# Patient Record
Sex: FEMALE | Race: WHITE | NOT HISPANIC OR LATINO | Employment: OTHER | URBAN - METROPOLITAN AREA
[De-identification: names, ages, dates, MRNs, and addresses within clinical notes are randomized per-mention and may not be internally consistent; named-entity substitution may affect disease eponyms.]

---

## 2023-03-30 PROBLEM — E11.69 TYPE 2 DIABETES MELLITUS WITH HYPERTRIGLYCERIDEMIA (MULTI): Status: ACTIVE | Noted: 2023-03-30

## 2023-03-30 PROBLEM — H61.23 BILATERAL IMPACTED CERUMEN: Status: ACTIVE | Noted: 2023-03-30

## 2023-03-30 PROBLEM — J32.9 CHRONIC SINUSITIS: Status: ACTIVE | Noted: 2023-03-30

## 2023-03-30 PROBLEM — M67.471 GANGLION CYST OF RIGHT FOOT: Status: ACTIVE | Noted: 2023-03-30

## 2023-03-30 PROBLEM — R05.9 COUGH: Status: ACTIVE | Noted: 2023-03-30

## 2023-03-30 PROBLEM — M79.675 CHRONIC PAIN OF TOE OF LEFT FOOT: Status: ACTIVE | Noted: 2023-03-30

## 2023-03-30 PROBLEM — E78.1 FAMILIAL HYPERTRIGLYCERIDEMIA: Status: ACTIVE | Noted: 2023-03-30

## 2023-03-30 PROBLEM — B36.9 FUNGAL DERMATITIS: Status: ACTIVE | Noted: 2023-03-30

## 2023-03-30 PROBLEM — I45.4 BBB (BUNDLE BRANCH BLOCK): Status: ACTIVE | Noted: 2023-03-30

## 2023-03-30 PROBLEM — E78.5 HYPERLIPIDEMIA: Status: ACTIVE | Noted: 2023-03-30

## 2023-03-30 PROBLEM — N89.8 VAGINAL IRRITATION: Status: ACTIVE | Noted: 2023-03-30

## 2023-03-30 PROBLEM — E55.9 VITAMIN D DEFICIENCY: Status: ACTIVE | Noted: 2023-03-30

## 2023-03-30 PROBLEM — B35.3 TINEA PEDIS: Status: ACTIVE | Noted: 2023-03-30

## 2023-03-30 PROBLEM — R42 VERTIGO: Status: ACTIVE | Noted: 2023-03-30

## 2023-03-30 PROBLEM — T75.3XXA MOTION SICKNESS: Status: ACTIVE | Noted: 2023-03-30

## 2023-03-30 PROBLEM — M79.676 PAIN IN TOE: Status: ACTIVE | Noted: 2023-03-30

## 2023-03-30 PROBLEM — K58.9 IRRITABLE BOWEL SYNDROME (IBS): Status: ACTIVE | Noted: 2023-03-30

## 2023-03-30 PROBLEM — J45.909 ASTHMA (HHS-HCC): Status: ACTIVE | Noted: 2023-03-30

## 2023-03-30 PROBLEM — M14.672 CHARCOT'S JOINT OF LEFT FOOT: Status: ACTIVE | Noted: 2023-03-30

## 2023-03-30 PROBLEM — E07.9 THYROID DISEASE: Status: ACTIVE | Noted: 2023-03-30

## 2023-03-30 PROBLEM — L71.9 ROSACEA: Status: ACTIVE | Noted: 2023-03-30

## 2023-03-30 PROBLEM — K31.7 GASTRIC POLYP: Status: ACTIVE | Noted: 2023-03-30

## 2023-03-30 PROBLEM — J34.89 NOSE PAIN: Status: ACTIVE | Noted: 2023-03-30

## 2023-03-30 PROBLEM — R39.11 URINARY HESITANCY: Status: ACTIVE | Noted: 2023-03-30

## 2023-03-30 PROBLEM — I49.8 FLUTTERING HEART: Status: ACTIVE | Noted: 2023-03-30

## 2023-03-30 PROBLEM — L90.5 ADHERENT SCAR: Status: ACTIVE | Noted: 2023-03-30

## 2023-03-30 PROBLEM — I67.1 INTRACRANIAL ANEURYSM (HHS-HCC): Status: ACTIVE | Noted: 2023-03-30

## 2023-03-30 PROBLEM — G89.29 CHRONIC PAIN OF TOE OF LEFT FOOT: Status: ACTIVE | Noted: 2023-03-30

## 2023-03-30 PROBLEM — R41.3 MEMORY LOSS: Status: ACTIVE | Noted: 2023-03-30

## 2023-03-30 PROBLEM — M54.9 BACK PAIN: Status: ACTIVE | Noted: 2023-03-30

## 2023-03-30 PROBLEM — G62.9 NEUROPATHY: Status: ACTIVE | Noted: 2023-03-30

## 2023-03-30 PROBLEM — R82.81 PYURIA: Status: ACTIVE | Noted: 2023-03-30

## 2023-03-30 PROBLEM — E11.40 DIABETIC NEUROPATHY (MULTI): Status: ACTIVE | Noted: 2023-03-30

## 2023-03-30 PROBLEM — M81.0 OSTEOPOROSIS: Status: ACTIVE | Noted: 2023-03-30

## 2023-03-30 PROBLEM — I10 ESSENTIAL HYPERTENSION: Status: ACTIVE | Noted: 2023-03-30

## 2023-03-30 PROBLEM — F41.9 ANXIETY: Status: ACTIVE | Noted: 2023-03-30

## 2023-03-30 PROBLEM — M25.551 PAIN OF BOTH HIP JOINTS: Status: ACTIVE | Noted: 2023-03-30

## 2023-03-30 PROBLEM — R29.898 WEAKNESS OF BOTH LOWER EXTREMITIES: Status: ACTIVE | Noted: 2023-03-30

## 2023-03-30 PROBLEM — K11.7 XEROSTOMIA DUE TO DEHYDRATION: Status: ACTIVE | Noted: 2023-03-30

## 2023-03-30 PROBLEM — M19.90 OSTEOARTHRITIS: Status: ACTIVE | Noted: 2023-03-30

## 2023-03-30 PROBLEM — D53.9 ANEMIA, DEFICIENCY: Status: ACTIVE | Noted: 2023-03-30

## 2023-03-30 PROBLEM — R68.84 JAW PAIN, NON-TMJ: Status: ACTIVE | Noted: 2023-03-30

## 2023-03-30 PROBLEM — J30.0 VASOMOTOR RHINITIS: Status: ACTIVE | Noted: 2023-03-30

## 2023-03-30 PROBLEM — M76.72 PERONEAL TENDINITIS OF LEFT LOWER EXTREMITY: Status: ACTIVE | Noted: 2023-03-30

## 2023-03-30 PROBLEM — R13.14 PHARYNGOESOPHAGEAL DYSPHAGIA: Status: ACTIVE | Noted: 2023-03-30

## 2023-03-30 PROBLEM — R12 PYROSIS: Status: ACTIVE | Noted: 2023-03-30

## 2023-03-30 PROBLEM — L98.9 SKIN LESION OF FACE: Status: ACTIVE | Noted: 2023-03-30

## 2023-03-30 PROBLEM — U07.1 DISEASE DUE TO SEVERE ACUTE RESPIRATORY SYNDROME CORONAVIRUS 2 (SARS-COV-2): Status: ACTIVE | Noted: 2023-03-30

## 2023-03-30 PROBLEM — I70.234: Status: ACTIVE | Noted: 2023-03-30

## 2023-03-30 PROBLEM — R33.9 URINARY RETENTION: Status: ACTIVE | Noted: 2023-03-30

## 2023-03-30 PROBLEM — G89.29 CHRONIC PAIN OF TOE OF RIGHT FOOT: Status: ACTIVE | Noted: 2023-03-30

## 2023-03-30 PROBLEM — K21.9 GASTROESOPHAGEAL REFLUX DISEASE: Status: ACTIVE | Noted: 2023-03-30

## 2023-03-30 PROBLEM — I73.9 PERIPHERAL VASCULAR DISEASE (CMS-HCC): Status: ACTIVE | Noted: 2023-03-30

## 2023-03-30 PROBLEM — N18.30 CKD STAGE 3 SECONDARY TO DIABETES (MULTI): Status: ACTIVE | Noted: 2023-03-30

## 2023-03-30 PROBLEM — R21 RASH: Status: ACTIVE | Noted: 2023-03-30

## 2023-03-30 PROBLEM — R32 INCONTINENCE: Status: ACTIVE | Noted: 2023-03-30

## 2023-03-30 PROBLEM — R11.0 MODERATE NAUSEA: Status: ACTIVE | Noted: 2023-03-30

## 2023-03-30 PROBLEM — R42 DIZZINESS: Status: ACTIVE | Noted: 2023-03-30

## 2023-03-30 PROBLEM — E86.0 XEROSTOMIA DUE TO DEHYDRATION: Status: ACTIVE | Noted: 2023-03-30

## 2023-03-30 PROBLEM — E78.1 TYPE 2 DIABETES MELLITUS WITH HYPERTRIGLYCERIDEMIA (MULTI): Status: ACTIVE | Noted: 2023-03-30

## 2023-03-30 PROBLEM — R31.9 HEMATURIA: Status: ACTIVE | Noted: 2023-03-30

## 2023-03-30 PROBLEM — N28.1 KIDNEY CYSTS: Status: ACTIVE | Noted: 2023-03-30

## 2023-03-30 PROBLEM — Z20.822 EXPOSURE TO COVID-19 VIRUS: Status: ACTIVE | Noted: 2023-03-30

## 2023-03-30 PROBLEM — E11.65 TYPE 2 DIABETES MELLITUS WITH HYPERGLYCEMIA (MULTI): Status: ACTIVE | Noted: 2023-03-30

## 2023-03-30 PROBLEM — R00.2 PALPITATIONS: Status: ACTIVE | Noted: 2023-03-30

## 2023-03-30 PROBLEM — D16.4 OSTEOMA OF NASAL SINUS: Status: ACTIVE | Noted: 2023-03-30

## 2023-03-30 PROBLEM — N89.8 VAGINAL DISCHARGE: Status: ACTIVE | Noted: 2023-03-30

## 2023-03-30 PROBLEM — K57.30 DIVERTICULOSIS OF LARGE INTESTINE WITHOUT HEMORRHAGE: Status: ACTIVE | Noted: 2023-03-30

## 2023-03-30 PROBLEM — B35.6 TINEA CRURIS: Status: ACTIVE | Noted: 2023-03-30

## 2023-03-30 PROBLEM — R60.0 EDEMA OF EXTREMITIES: Status: ACTIVE | Noted: 2023-03-30

## 2023-03-30 PROBLEM — N81.6 RECTOCELE, FEMALE: Status: ACTIVE | Noted: 2023-03-30

## 2023-03-30 PROBLEM — R51.9 HEADACHE: Status: ACTIVE | Noted: 2023-03-30

## 2023-03-30 PROBLEM — R55 NEAR SYNCOPE: Status: ACTIVE | Noted: 2023-03-30

## 2023-03-30 PROBLEM — H81.10 BPPV (BENIGN PAROXYSMAL POSITIONAL VERTIGO): Status: ACTIVE | Noted: 2023-03-30

## 2023-03-30 PROBLEM — N39.0 FREQUENT UTI: Status: ACTIVE | Noted: 2023-03-30

## 2023-03-30 PROBLEM — M79.674 CHRONIC PAIN OF TOE OF RIGHT FOOT: Status: ACTIVE | Noted: 2023-03-30

## 2023-03-30 PROBLEM — R94.31 ABNORMAL EKG: Status: ACTIVE | Noted: 2023-03-30

## 2023-03-30 PROBLEM — S39.012A LUMBAR STRAIN, INITIAL ENCOUNTER: Status: ACTIVE | Noted: 2023-03-30

## 2023-03-30 PROBLEM — F39 MOOD DISORDER (CMS-HCC): Status: ACTIVE | Noted: 2023-03-30

## 2023-03-30 PROBLEM — B35.4 TINEA CORPORIS: Status: ACTIVE | Noted: 2023-03-30

## 2023-03-30 PROBLEM — M76.70 PERONEAL TENDONITIS: Status: ACTIVE | Noted: 2023-03-30

## 2023-03-30 PROBLEM — E83.42 HYPOMAGNESEMIA: Status: ACTIVE | Noted: 2023-03-30

## 2023-03-30 PROBLEM — M79.89 SWELLING OF LEFT FOOT: Status: ACTIVE | Noted: 2023-03-30

## 2023-03-30 PROBLEM — J06.9 VIRAL URI WITH COUGH: Status: ACTIVE | Noted: 2023-03-30

## 2023-03-30 PROBLEM — M10.9 GOUT: Status: ACTIVE | Noted: 2023-03-30

## 2023-03-30 PROBLEM — B35.1 OM (ONYCHOMYCOSIS): Status: ACTIVE | Noted: 2023-03-30

## 2023-03-30 PROBLEM — J30.9 ALLERGIC RHINITIS: Status: ACTIVE | Noted: 2023-03-30

## 2023-03-30 PROBLEM — E11.22 CKD STAGE 3 SECONDARY TO DIABETES (MULTI): Status: ACTIVE | Noted: 2023-03-30

## 2023-03-30 PROBLEM — J45.909 RAD (REACTIVE AIRWAY DISEASE) (HHS-HCC): Status: ACTIVE | Noted: 2023-03-30

## 2023-03-30 PROBLEM — R26.89 POOR BALANCE: Status: ACTIVE | Noted: 2023-03-30

## 2023-03-30 PROBLEM — F32.A DEPRESSION: Status: ACTIVE | Noted: 2023-03-30

## 2023-03-30 PROBLEM — M25.552 PAIN OF BOTH HIP JOINTS: Status: ACTIVE | Noted: 2023-03-30

## 2023-03-30 PROBLEM — E78.2 HYPERLIPIDEMIA, MIXED: Status: ACTIVE | Noted: 2023-03-30

## 2023-03-30 PROBLEM — N60.19 FIBROCYSTIC BREAST: Status: ACTIVE | Noted: 2023-03-30

## 2023-03-30 PROBLEM — R06.02 SHORTNESS OF BREATH ON EXERTION: Status: ACTIVE | Noted: 2023-03-30

## 2023-03-30 PROBLEM — R30.0 DYSURIA: Status: ACTIVE | Noted: 2023-03-30

## 2023-03-30 PROBLEM — M26.629 TMJ SYNDROME: Status: ACTIVE | Noted: 2023-03-30

## 2023-03-30 PROBLEM — R10.2 PELVIC PAIN IN FEMALE: Status: ACTIVE | Noted: 2023-03-30

## 2023-03-30 PROBLEM — H61.21 IMPACTED CERUMEN OF RIGHT EAR: Status: ACTIVE | Noted: 2023-03-30

## 2023-03-30 PROBLEM — N39.0 UTI (URINARY TRACT INFECTION): Status: ACTIVE | Noted: 2023-03-30

## 2023-03-30 PROBLEM — K63.5 COLONIC POLYP: Status: ACTIVE | Noted: 2023-03-30

## 2023-03-30 RX ORDER — BLOOD SUGAR DIAGNOSTIC
STRIP MISCELLANEOUS
COMMUNITY
Start: 2022-08-04

## 2023-03-30 RX ORDER — BENAZEPRIL HYDROCHLORIDE 40 MG/1
20 TABLET ORAL DAILY
COMMUNITY
End: 2023-10-04 | Stop reason: ALTCHOICE

## 2023-03-30 RX ORDER — AMLODIPINE BESYLATE 5 MG/1
1 TABLET ORAL DAILY
COMMUNITY
Start: 2021-02-05 | End: 2023-04-21 | Stop reason: SINTOL

## 2023-03-30 RX ORDER — ATORVASTATIN CALCIUM 40 MG/1
1 TABLET, FILM COATED ORAL DAILY
COMMUNITY
Start: 2018-03-05 | End: 2023-05-19 | Stop reason: ALTCHOICE

## 2023-03-30 RX ORDER — LATANOPROST 50 UG/ML
SOLUTION/ DROPS OPHTHALMIC
COMMUNITY
Start: 2021-05-27

## 2023-03-30 RX ORDER — ALBUTEROL SULFATE 90 UG/1
POWDER, METERED RESPIRATORY (INHALATION)
COMMUNITY
Start: 2020-04-07 | End: 2023-06-12 | Stop reason: SDUPTHER

## 2023-03-30 RX ORDER — POLYETHYLENE GLYCOL 3350, SODIUM CHLORIDE, SODIUM BICARBONATE, POTASSIUM CHLORIDE 420; 11.2; 5.72; 1.48 G/4L; G/4L; G/4L; G/4L
POWDER, FOR SOLUTION ORAL
COMMUNITY
Start: 2023-03-14 | End: 2024-04-12 | Stop reason: ALTCHOICE

## 2023-03-30 RX ORDER — INSULIN LISPRO 100 [IU]/ML
INJECTION, SOLUTION INTRAVENOUS; SUBCUTANEOUS
COMMUNITY
Start: 2019-09-30 | End: 2023-11-09 | Stop reason: SDUPTHER

## 2023-03-30 RX ORDER — FLUTICASONE PROPIONATE AND SALMETEROL 500; 50 UG/1; UG/1
1 POWDER RESPIRATORY (INHALATION) 2 TIMES DAILY
COMMUNITY
Start: 2022-12-09 | End: 2023-05-19 | Stop reason: ALTCHOICE

## 2023-03-30 RX ORDER — INSULIN GLARGINE 100 [IU]/ML
40 INJECTION, SOLUTION SUBCUTANEOUS
COMMUNITY
Start: 2019-09-20 | End: 2023-11-09 | Stop reason: SDUPTHER

## 2023-03-30 RX ORDER — SCOLOPAMINE TRANSDERMAL SYSTEM 1 MG/1
1 PATCH, EXTENDED RELEASE TRANSDERMAL
COMMUNITY
Start: 2022-08-19 | End: 2023-10-04 | Stop reason: ALTCHOICE

## 2023-03-30 RX ORDER — CARVEDILOL 12.5 MG/1
1 TABLET ORAL 2 TIMES DAILY
COMMUNITY
End: 2023-04-28

## 2023-03-30 RX ORDER — DULOXETIN HYDROCHLORIDE 60 MG/1
1 CAPSULE, DELAYED RELEASE ORAL DAILY
COMMUNITY
End: 2023-05-19 | Stop reason: SDUPTHER

## 2023-03-30 RX ORDER — FLUTICASONE PROPIONATE 50 MCG
2 SPRAY, SUSPENSION (ML) NASAL 2 TIMES DAILY
COMMUNITY
Start: 2020-03-02

## 2023-03-30 RX ORDER — FENOFIBRATE 160 MG/1
1 TABLET ORAL DAILY
COMMUNITY
End: 2023-04-18

## 2023-03-30 RX ORDER — OMEPRAZOLE 40 MG/1
1 CAPSULE, DELAYED RELEASE ORAL DAILY
COMMUNITY
Start: 2019-10-02 | End: 2023-05-10

## 2023-03-30 RX ORDER — SEMAGLUTIDE 1.34 MG/ML
0.5 INJECTION, SOLUTION SUBCUTANEOUS
COMMUNITY
Start: 2022-05-11 | End: 2023-11-09 | Stop reason: ALTCHOICE

## 2023-03-30 RX ORDER — PSEUDOEPHEDRINE HCL 120 MG
TABLET, EXTENDED RELEASE ORAL
COMMUNITY

## 2023-03-30 RX ORDER — GABAPENTIN 100 MG/1
1 CAPSULE ORAL 2 TIMES DAILY
COMMUNITY
Start: 2019-09-12 | End: 2023-05-10

## 2023-03-30 RX ORDER — CETIRIZINE HYDROCHLORIDE 10 MG/1
1 TABLET ORAL DAILY
COMMUNITY
Start: 2020-11-13 | End: 2023-11-02 | Stop reason: SDUPTHER

## 2023-03-31 ENCOUNTER — OFFICE VISIT (OUTPATIENT)
Dept: PRIMARY CARE | Facility: CLINIC | Age: 78
End: 2023-03-31
Payer: MEDICARE

## 2023-03-31 VITALS
DIASTOLIC BLOOD PRESSURE: 60 MMHG | HEIGHT: 65 IN | TEMPERATURE: 98 F | BODY MASS INDEX: 28.16 KG/M2 | WEIGHT: 169 LBS | OXYGEN SATURATION: 98 % | RESPIRATION RATE: 14 BRPM | HEART RATE: 79 BPM | SYSTOLIC BLOOD PRESSURE: 124 MMHG

## 2023-03-31 DIAGNOSIS — N18.30 CKD STAGE 3 SECONDARY TO DIABETES (MULTI): ICD-10-CM

## 2023-03-31 DIAGNOSIS — R10.30 LOWER ABDOMINAL PAIN: Primary | ICD-10-CM

## 2023-03-31 DIAGNOSIS — F39 MOOD DISORDER (CMS-HCC): ICD-10-CM

## 2023-03-31 DIAGNOSIS — M87.00 AVASCULAR NECROSIS OF BONE (MULTI): ICD-10-CM

## 2023-03-31 DIAGNOSIS — I10 ESSENTIAL HYPERTENSION: ICD-10-CM

## 2023-03-31 DIAGNOSIS — I73.9 PERIPHERAL VASCULAR DISEASE (CMS-HCC): ICD-10-CM

## 2023-03-31 DIAGNOSIS — M54.40 ACUTE LEFT-SIDED LOW BACK PAIN WITH SCIATICA, SCIATICA LATERALITY UNSPECIFIED: ICD-10-CM

## 2023-03-31 DIAGNOSIS — I70.234 ATHEROSCLEROSIS OF NATIVE ARTERY OF RIGHT LOWER EXTREMITY WITH ULCERATION OF HEEL (MULTI): ICD-10-CM

## 2023-03-31 DIAGNOSIS — E11.22 CKD STAGE 3 SECONDARY TO DIABETES (MULTI): ICD-10-CM

## 2023-03-31 PROCEDURE — 1160F RVW MEDS BY RX/DR IN RCRD: CPT | Performed by: INTERNAL MEDICINE

## 2023-03-31 PROCEDURE — 3066F NEPHROPATHY DOC TX: CPT | Performed by: INTERNAL MEDICINE

## 2023-03-31 PROCEDURE — 1159F MED LIST DOCD IN RCRD: CPT | Performed by: INTERNAL MEDICINE

## 2023-03-31 PROCEDURE — 4010F ACE/ARB THERAPY RXD/TAKEN: CPT | Performed by: INTERNAL MEDICINE

## 2023-03-31 PROCEDURE — 99214 OFFICE O/P EST MOD 30 MIN: CPT | Performed by: INTERNAL MEDICINE

## 2023-03-31 PROCEDURE — 3078F DIAST BP <80 MM HG: CPT | Performed by: INTERNAL MEDICINE

## 2023-03-31 PROCEDURE — 3074F SYST BP LT 130 MM HG: CPT | Performed by: INTERNAL MEDICINE

## 2023-03-31 PROCEDURE — 1036F TOBACCO NON-USER: CPT | Performed by: INTERNAL MEDICINE

## 2023-03-31 RX ORDER — METRONIDAZOLE 500 MG/1
500 TABLET ORAL 2 TIMES DAILY
Qty: 14 TABLET | Refills: 0 | Status: SHIPPED | OUTPATIENT
Start: 2023-03-31 | End: 2023-04-07

## 2023-03-31 RX ORDER — CIPROFLOXACIN 500 MG/1
500 TABLET ORAL 2 TIMES DAILY
Qty: 14 TABLET | Refills: 0 | Status: SHIPPED | OUTPATIENT
Start: 2023-03-31 | End: 2023-04-07

## 2023-03-31 ASSESSMENT — PAIN SCALES - GENERAL: PAINLEVEL: 6

## 2023-03-31 ASSESSMENT — ENCOUNTER SYMPTOMS
WHEEZING: 0
CONSTIPATION: 1
ABDOMINAL PAIN: 1
SHORTNESS OF BREATH: 0
COUGH: 0

## 2023-03-31 NOTE — PROGRESS NOTES
"Subjective   Patient ID: Elma Hernandez is a 77 y.o. female who presents for Groin Pain (Pain x 5 days).    Groin Pain  Associated symptoms include abdominal pain and constipation.    She had what started as low back pain - left lower PSIS area.  Then felt pain in her actual groin.  Some leg ache as well.  Both of these are improving, but is now having LLQ abdominal discomfort.  She admits to issues with constipation.  No N/V.  No F/C/S.      Review of Systems   Respiratory:  Negative for cough, shortness of breath and wheezing.    Gastrointestinal:  Positive for abdominal pain and constipation.       Objective   /60 (BP Location: Right arm, Patient Position: Sitting, BP Cuff Size: Adult)   Pulse 79   Temp 36.7 °C (98 °F) (Tympanic)   Resp 14   Ht 1.651 m (5' 5\")   Wt 76.7 kg (169 lb)   SpO2 98%   BMI 28.12 kg/m²     Physical Exam  Constitutional:       General: She is not in acute distress.     Appearance: Normal appearance. She is not ill-appearing.   HENT:      Head: Normocephalic and atraumatic.      Nose: Nose normal.   Eyes:      Extraocular Movements: Extraocular movements intact.      Conjunctiva/sclera: Conjunctivae normal.      Pupils: Pupils are equal, round, and reactive to light.   Cardiovascular:      Rate and Rhythm: Normal rate and regular rhythm.      Heart sounds: Normal heart sounds.   Pulmonary:      Effort: Pulmonary effort is normal.      Breath sounds: Normal breath sounds.   Abdominal:      General: Abdomen is flat. There is no distension.      Palpations: Abdomen is soft.      Comments: Minimal discomfort with deep palpation of LLQ.     Musculoskeletal:         General: Normal range of motion.      Cervical back: Neck supple.   Neurological:      General: No focal deficit present.      Mental Status: She is alert.      Gait: Gait normal.   Psychiatric:         Mood and Affect: Mood normal.         Behavior: Behavior normal.         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  Her back and groin pain certainly seem MSK in etiology, likely arthritis in nature.  We discussed conservative treatments for this including 400mg of ibuprofen TID for 3-4 days with GI prophylaxis and plenty of water.    We discussed bowel regimen to get her bowels moving.  It does not seem as though she has diverticulitis or any infection currently.  Howerver, if pain increases and/or she develops any F/C/S over the weekend we discussed cipro/flagyl treatment.    All questions answered.     Follow up as previously scheduled - sooner if any issues/changes.

## 2023-04-03 ENCOUNTER — OFFICE VISIT (OUTPATIENT)
Dept: PRIMARY CARE | Facility: CLINIC | Age: 78
End: 2023-04-03
Payer: MEDICARE

## 2023-04-03 VITALS
SYSTOLIC BLOOD PRESSURE: 108 MMHG | WEIGHT: 171 LBS | DIASTOLIC BLOOD PRESSURE: 58 MMHG | OXYGEN SATURATION: 97 % | RESPIRATION RATE: 18 BRPM | TEMPERATURE: 97.1 F | BODY MASS INDEX: 28.46 KG/M2 | HEART RATE: 74 BPM

## 2023-04-03 DIAGNOSIS — H61.23 BILATERAL IMPACTED CERUMEN: Primary | ICD-10-CM

## 2023-04-03 PROCEDURE — 1160F RVW MEDS BY RX/DR IN RCRD: CPT | Performed by: NURSE PRACTITIONER

## 2023-04-03 PROCEDURE — 69209 REMOVE IMPACTED EAR WAX UNI: CPT | Performed by: NURSE PRACTITIONER

## 2023-04-03 PROCEDURE — 3074F SYST BP LT 130 MM HG: CPT | Performed by: NURSE PRACTITIONER

## 2023-04-03 PROCEDURE — 1159F MED LIST DOCD IN RCRD: CPT | Performed by: NURSE PRACTITIONER

## 2023-04-03 PROCEDURE — 3066F NEPHROPATHY DOC TX: CPT | Performed by: NURSE PRACTITIONER

## 2023-04-03 PROCEDURE — 1036F TOBACCO NON-USER: CPT | Performed by: NURSE PRACTITIONER

## 2023-04-03 PROCEDURE — 3078F DIAST BP <80 MM HG: CPT | Performed by: NURSE PRACTITIONER

## 2023-04-03 PROCEDURE — 99214 OFFICE O/P EST MOD 30 MIN: CPT | Performed by: NURSE PRACTITIONER

## 2023-04-03 PROCEDURE — 4010F ACE/ARB THERAPY RXD/TAKEN: CPT | Performed by: NURSE PRACTITIONER

## 2023-04-03 ASSESSMENT — ENCOUNTER SYMPTOMS
HEADACHES: 0
SINUS PRESSURE: 0
VOMITING: 0
RHINORRHEA: 0
CHILLS: 0
ACTIVITY CHANGE: 0
COUGH: 0
FEVER: 0
CONSTIPATION: 0
SORE THROAT: 0
SINUS PAIN: 0
NAUSEA: 0
SLEEP DISTURBANCE: 0
FATIGUE: 0
DIARRHEA: 0

## 2023-04-03 NOTE — PROGRESS NOTES
Subjective   Patient ID: Elma Hernandez is a 77 y.o. female who presents for Ear Fullness.    Bilateral ears  R ear was really blocked  Tried to use Debrox    Has known hx of ear wax    Ear Fullness   There is pain in the right ear. This is a new problem. The current episode started in the past 7 days. The problem occurs constantly. The problem has been gradually improving. Pertinent negatives include no coughing, diarrhea, ear discharge, headaches, rhinorrhea, sore throat or vomiting. She has tried ear drops for the symptoms.        Review of Systems   Constitutional:  Negative for activity change, chills, fatigue and fever.   HENT:  Negative for congestion, ear discharge, postnasal drip, rhinorrhea, sinus pressure, sinus pain and sore throat.    Respiratory:  Negative for cough.    Gastrointestinal:  Negative for constipation, diarrhea, nausea and vomiting.   Neurological:  Negative for headaches.   Psychiatric/Behavioral:  Negative for sleep disturbance.        Objective   /58   Pulse 74   Temp 36.2 °C (97.1 °F) (Temporal)   Resp 18   Wt 77.6 kg (171 lb)   SpO2 97%   BMI 28.46 kg/m²     Physical Exam  Vitals reviewed.   Constitutional:       Appearance: Normal appearance.   HENT:      Head: Normocephalic.      Right Ear: Decreased hearing noted. No drainage. There is impacted cerumen.      Left Ear: Decreased hearing noted. No drainage. There is impacted cerumen.      Nose: Nose normal.      Mouth/Throat:      Lips: Pink.      Mouth: Mucous membranes are moist.   Eyes:      Extraocular Movements: Extraocular movements intact.      Pupils: Pupils are equal, round, and reactive to light.   Cardiovascular:      Rate and Rhythm: Normal rate and regular rhythm.      Pulses: Normal pulses.      Heart sounds: Normal heart sounds.   Musculoskeletal:      Cervical back: Normal range of motion and neck supple.   Skin:     General: Skin is warm and dry.      Capillary Refill: Capillary refill takes less than 2  seconds.   Neurological:      Mental Status: She is alert and oriented to person, place, and time.   Psychiatric:         Mood and Affect: Mood normal.         Behavior: Behavior normal.     Patient ID: Elma Hernandez is a 77 y.o. female.    Ear Cerumen Removal    Date/Time: 4/3/2023 1:41 PM    Performed by: HARVEY Artis  Authorized by: HARVEY Artis    Consent:     Consent obtained:  Verbal    Consent given by:  Patient    Risks, benefits, and alternatives were discussed: yes      Risks discussed:  Bleeding, pain and dizziness    Alternatives discussed:  Observation  Universal protocol:     Procedure explained and questions answered to patient or proxy's satisfaction: yes      Relevant documents present and verified: no      Test results available: no      Imaging studies available: no      Required blood products, implants, devices, and special equipment available: yes      Site/side marked: no      Patient identity confirmed:  Verbally with patient  Procedure details:     Location:  L ear and R ear    Procedure type: irrigation      Procedure outcomes: cerumen removed    Post-procedure details:     Inspection:  No bleeding, TM intact and ear canal clear    Hearing quality:  Improved    Procedure completion:  Tolerated      Assessment/Plan

## 2023-04-03 NOTE — ASSESSMENT & PLAN NOTE
Bilateral ears irrigated successfully  Pt tolerated well and states improvement  Can follow up with PCP as needed

## 2023-04-18 DIAGNOSIS — E78.5 HYPERLIPIDEMIA, UNSPECIFIED HYPERLIPIDEMIA TYPE: Primary | ICD-10-CM

## 2023-04-18 RX ORDER — FENOFIBRATE 160 MG/1
TABLET ORAL
Qty: 90 TABLET | Refills: 0 | Status: SHIPPED | OUTPATIENT
Start: 2023-04-18 | End: 2023-05-19 | Stop reason: SDUPTHER

## 2023-04-18 RX ORDER — ATORVASTATIN CALCIUM 10 MG/1
TABLET, FILM COATED ORAL
Qty: 90 TABLET | Refills: 0 | Status: SHIPPED | OUTPATIENT
Start: 2023-04-18 | End: 2023-05-19 | Stop reason: SDUPTHER

## 2023-04-20 LAB
ALANINE AMINOTRANSFERASE (SGPT) (U/L) IN SER/PLAS: 16 U/L (ref 7–45)
ALBUMIN (G/DL) IN SER/PLAS: 4.4 G/DL (ref 3.4–5)
ALKALINE PHOSPHATASE (U/L) IN SER/PLAS: 42 U/L (ref 33–136)
ANION GAP IN SER/PLAS: 12 MMOL/L (ref 10–20)
ASPARTATE AMINOTRANSFERASE (SGOT) (U/L) IN SER/PLAS: 18 U/L (ref 9–39)
BILIRUBIN TOTAL (MG/DL) IN SER/PLAS: 0.5 MG/DL (ref 0–1.2)
CALCIUM (MG/DL) IN SER/PLAS: 9.6 MG/DL (ref 8.6–10.3)
CARBON DIOXIDE, TOTAL (MMOL/L) IN SER/PLAS: 27 MMOL/L (ref 21–32)
CHLORIDE (MMOL/L) IN SER/PLAS: 100 MMOL/L (ref 98–107)
CHOLESTEROL (MG/DL) IN SER/PLAS: 169 MG/DL (ref 0–199)
CHOLESTEROL IN HDL (MG/DL) IN SER/PLAS: 38 MG/DL
CHOLESTEROL/HDL RATIO: 4.4
COBALAMIN (VITAMIN B12) (PG/ML) IN SER/PLAS: 288 PG/ML (ref 211–911)
CREATININE (MG/DL) IN SER/PLAS: 1.8 MG/DL (ref 0.5–1.05)
ERYTHROCYTE DISTRIBUTION WIDTH (RATIO) BY AUTOMATED COUNT: 12.8 % (ref 11.5–14.5)
ERYTHROCYTE MEAN CORPUSCULAR HEMOGLOBIN CONCENTRATION (G/DL) BY AUTOMATED: 33 G/DL (ref 32–36)
ERYTHROCYTE MEAN CORPUSCULAR VOLUME (FL) BY AUTOMATED COUNT: 83 FL (ref 80–100)
ERYTHROCYTES (10*6/UL) IN BLOOD BY AUTOMATED COUNT: 4.4 X10E12/L (ref 4–5.2)
ESTIMATED AVERAGE GLUCOSE FOR HBA1C: 143 MG/DL
GFR FEMALE: 29 ML/MIN/1.73M2
GLUCOSE (MG/DL) IN SER/PLAS: 109 MG/DL (ref 74–99)
HEMATOCRIT (%) IN BLOOD BY AUTOMATED COUNT: 36.4 % (ref 36–46)
HEMOGLOBIN (G/DL) IN BLOOD: 12 G/DL (ref 12–16)
HEMOGLOBIN A1C/HEMOGLOBIN TOTAL IN BLOOD: 6.6 %
LDL: 88 MG/DL (ref 0–99)
LEUKOCYTES (10*3/UL) IN BLOOD BY AUTOMATED COUNT: 5.4 X10E9/L (ref 4.4–11.3)
MAGNESIUM (MG/DL) IN SER/PLAS: 1.92 MG/DL (ref 1.6–2.4)
NON HDL CHOLESTEROL: 131 MG/DL
NRBC (PER 100 WBCS) BY AUTOMATED COUNT: 0 /100 WBC (ref 0–0)
PLATELETS (10*3/UL) IN BLOOD AUTOMATED COUNT: 225 X10E9/L (ref 150–450)
POTASSIUM (MMOL/L) IN SER/PLAS: 4.6 MMOL/L (ref 3.5–5.3)
PROTEIN TOTAL: 6.9 G/DL (ref 6.4–8.2)
SODIUM (MMOL/L) IN SER/PLAS: 134 MMOL/L (ref 136–145)
THYROTROPIN (MIU/L) IN SER/PLAS BY DETECTION LIMIT <= 0.05 MIU/L: 1.98 MIU/L (ref 0.44–3.98)
TRIGLYCERIDE (MG/DL) IN SER/PLAS: 215 MG/DL (ref 0–149)
UREA NITROGEN (MG/DL) IN SER/PLAS: 34 MG/DL (ref 6–23)
VLDL: 43 MG/DL (ref 0–40)

## 2023-04-21 ENCOUNTER — OFFICE VISIT (OUTPATIENT)
Dept: PRIMARY CARE | Facility: CLINIC | Age: 78
End: 2023-04-21
Payer: MEDICARE

## 2023-04-21 VITALS
RESPIRATION RATE: 16 BRPM | TEMPERATURE: 98 F | HEIGHT: 65 IN | DIASTOLIC BLOOD PRESSURE: 46 MMHG | HEART RATE: 78 BPM | WEIGHT: 173 LBS | SYSTOLIC BLOOD PRESSURE: 104 MMHG | BODY MASS INDEX: 28.82 KG/M2

## 2023-04-21 DIAGNOSIS — I73.9 PAD (PERIPHERAL ARTERY DISEASE) (CMS-HCC): ICD-10-CM

## 2023-04-21 DIAGNOSIS — I10 ESSENTIAL HYPERTENSION: ICD-10-CM

## 2023-04-21 DIAGNOSIS — N17.9 AKI (ACUTE KIDNEY INJURY) (CMS-HCC): ICD-10-CM

## 2023-04-21 DIAGNOSIS — E53.8 B12 DEFICIENCY: ICD-10-CM

## 2023-04-21 DIAGNOSIS — E07.9 THYROID DISEASE: Primary | ICD-10-CM

## 2023-04-21 PROBLEM — L98.9 SKIN LESION OF FACE: Status: RESOLVED | Noted: 2023-03-30 | Resolved: 2023-04-21

## 2023-04-21 PROBLEM — R21 RASH: Status: RESOLVED | Noted: 2023-03-30 | Resolved: 2023-04-21

## 2023-04-21 PROBLEM — G62.9 NEUROPATHY: Status: RESOLVED | Noted: 2023-03-30 | Resolved: 2023-04-21

## 2023-04-21 PROBLEM — N39.0 FREQUENT UTI: Status: RESOLVED | Noted: 2023-03-30 | Resolved: 2023-04-21

## 2023-04-21 PROBLEM — B35.3 TINEA PEDIS: Status: RESOLVED | Noted: 2023-03-30 | Resolved: 2023-04-21

## 2023-04-21 PROBLEM — M26.629 TMJ SYNDROME: Status: RESOLVED | Noted: 2023-03-30 | Resolved: 2023-04-21

## 2023-04-21 PROBLEM — N89.8 VAGINAL DISCHARGE: Status: RESOLVED | Noted: 2023-03-30 | Resolved: 2023-04-21

## 2023-04-21 PROBLEM — R33.9 URINARY RETENTION: Status: RESOLVED | Noted: 2023-03-30 | Resolved: 2023-04-21

## 2023-04-21 PROBLEM — Z20.822 EXPOSURE TO COVID-19 VIRUS: Status: RESOLVED | Noted: 2023-03-30 | Resolved: 2023-04-21

## 2023-04-21 PROBLEM — M25.551 PAIN OF BOTH HIP JOINTS: Status: RESOLVED | Noted: 2023-03-30 | Resolved: 2023-04-21

## 2023-04-21 PROBLEM — I49.8 FLUTTERING HEART: Status: RESOLVED | Noted: 2023-03-30 | Resolved: 2023-04-21

## 2023-04-21 PROBLEM — H81.10 BPPV (BENIGN PAROXYSMAL POSITIONAL VERTIGO): Status: RESOLVED | Noted: 2023-03-30 | Resolved: 2023-04-21

## 2023-04-21 PROBLEM — B35.4 TINEA CORPORIS: Status: RESOLVED | Noted: 2023-03-30 | Resolved: 2023-04-21

## 2023-04-21 PROBLEM — K63.5 COLONIC POLYP: Status: RESOLVED | Noted: 2023-03-30 | Resolved: 2023-04-21

## 2023-04-21 PROBLEM — D16.4 OSTEOMA OF NASAL SINUS: Status: RESOLVED | Noted: 2023-03-30 | Resolved: 2023-04-21

## 2023-04-21 PROBLEM — M25.552 PAIN OF BOTH HIP JOINTS: Status: RESOLVED | Noted: 2023-03-30 | Resolved: 2023-04-21

## 2023-04-21 PROBLEM — R12 PYROSIS: Status: RESOLVED | Noted: 2023-03-30 | Resolved: 2023-04-21

## 2023-04-21 PROBLEM — N60.19 FIBROCYSTIC BREAST: Status: RESOLVED | Noted: 2023-03-30 | Resolved: 2023-04-21

## 2023-04-21 PROBLEM — R39.11 URINARY HESITANCY: Status: RESOLVED | Noted: 2023-03-30 | Resolved: 2023-04-21

## 2023-04-21 PROBLEM — M76.70 PERONEAL TENDONITIS: Status: RESOLVED | Noted: 2023-03-30 | Resolved: 2023-04-21

## 2023-04-21 PROBLEM — J32.9 CHRONIC SINUSITIS: Status: RESOLVED | Noted: 2023-03-30 | Resolved: 2023-04-21

## 2023-04-21 PROBLEM — R06.02 SHORTNESS OF BREATH ON EXERTION: Status: RESOLVED | Noted: 2023-03-30 | Resolved: 2023-04-21

## 2023-04-21 PROBLEM — R05.9 COUGH: Status: RESOLVED | Noted: 2023-03-30 | Resolved: 2023-04-21

## 2023-04-21 PROBLEM — M67.471 GANGLION CYST OF RIGHT FOOT: Status: RESOLVED | Noted: 2023-03-30 | Resolved: 2023-04-21

## 2023-04-21 PROBLEM — G89.29 CHRONIC PAIN OF TOE OF RIGHT FOOT: Status: RESOLVED | Noted: 2023-03-30 | Resolved: 2023-04-21

## 2023-04-21 PROBLEM — K11.7 XEROSTOMIA DUE TO DEHYDRATION: Status: RESOLVED | Noted: 2023-03-30 | Resolved: 2023-04-21

## 2023-04-21 PROBLEM — N81.6 RECTOCELE, FEMALE: Status: RESOLVED | Noted: 2023-03-30 | Resolved: 2023-04-21

## 2023-04-21 PROBLEM — T75.3XXA MOTION SICKNESS: Status: RESOLVED | Noted: 2023-03-30 | Resolved: 2023-04-21

## 2023-04-21 PROBLEM — B35.1 OM (ONYCHOMYCOSIS): Status: RESOLVED | Noted: 2023-03-30 | Resolved: 2023-04-21

## 2023-04-21 PROBLEM — R51.9 HEADACHE: Status: RESOLVED | Noted: 2023-03-30 | Resolved: 2023-04-21

## 2023-04-21 PROBLEM — M79.674 CHRONIC PAIN OF TOE OF RIGHT FOOT: Status: RESOLVED | Noted: 2023-03-30 | Resolved: 2023-04-21

## 2023-04-21 PROBLEM — J06.9 VIRAL URI WITH COUGH: Status: RESOLVED | Noted: 2023-03-30 | Resolved: 2023-04-21

## 2023-04-21 PROBLEM — H61.23 BILATERAL IMPACTED CERUMEN: Status: RESOLVED | Noted: 2023-03-30 | Resolved: 2023-04-21

## 2023-04-21 PROBLEM — J45.909 ASTHMA (HHS-HCC): Status: RESOLVED | Noted: 2023-03-30 | Resolved: 2023-04-21

## 2023-04-21 PROBLEM — U07.1 DISEASE DUE TO SEVERE ACUTE RESPIRATORY SYNDROME CORONAVIRUS 2 (SARS-COV-2): Status: RESOLVED | Noted: 2023-03-30 | Resolved: 2023-04-21

## 2023-04-21 PROBLEM — R13.14 PHARYNGOESOPHAGEAL DYSPHAGIA: Status: RESOLVED | Noted: 2023-03-30 | Resolved: 2023-04-21

## 2023-04-21 PROBLEM — R94.31 ABNORMAL EKG: Status: RESOLVED | Noted: 2023-03-30 | Resolved: 2023-04-21

## 2023-04-21 PROBLEM — R68.84 JAW PAIN, NON-TMJ: Status: RESOLVED | Noted: 2023-03-30 | Resolved: 2023-04-21

## 2023-04-21 PROBLEM — L90.5 ADHERENT SCAR: Status: RESOLVED | Noted: 2023-03-30 | Resolved: 2023-04-21

## 2023-04-21 PROBLEM — E78.5 HYPERLIPIDEMIA: Status: RESOLVED | Noted: 2023-03-30 | Resolved: 2023-04-21

## 2023-04-21 PROBLEM — R82.81 PYURIA: Status: RESOLVED | Noted: 2023-03-30 | Resolved: 2023-04-21

## 2023-04-21 PROBLEM — G89.29 CHRONIC PAIN OF TOE OF LEFT FOOT: Status: RESOLVED | Noted: 2023-03-30 | Resolved: 2023-04-21

## 2023-04-21 PROBLEM — M79.675 CHRONIC PAIN OF TOE OF LEFT FOOT: Status: RESOLVED | Noted: 2023-03-30 | Resolved: 2023-04-21

## 2023-04-21 PROBLEM — R11.0 MODERATE NAUSEA: Status: RESOLVED | Noted: 2023-03-30 | Resolved: 2023-04-21

## 2023-04-21 PROBLEM — J34.89 NOSE PAIN: Status: RESOLVED | Noted: 2023-03-30 | Resolved: 2023-04-21

## 2023-04-21 PROBLEM — M76.72 PERONEAL TENDINITIS OF LEFT LOWER EXTREMITY: Status: RESOLVED | Noted: 2023-03-30 | Resolved: 2023-04-21

## 2023-04-21 PROBLEM — R31.9 HEMATURIA: Status: RESOLVED | Noted: 2023-03-30 | Resolved: 2023-04-21

## 2023-04-21 PROBLEM — N28.1 KIDNEY CYSTS: Status: RESOLVED | Noted: 2023-03-30 | Resolved: 2023-04-21

## 2023-04-21 PROBLEM — R10.2 PELVIC PAIN IN FEMALE: Status: RESOLVED | Noted: 2023-03-30 | Resolved: 2023-04-21

## 2023-04-21 PROBLEM — R32 INCONTINENCE: Status: RESOLVED | Noted: 2023-03-30 | Resolved: 2023-04-21

## 2023-04-21 PROBLEM — M54.9 BACK PAIN: Status: RESOLVED | Noted: 2023-03-30 | Resolved: 2023-04-21

## 2023-04-21 PROBLEM — N89.8 VAGINAL IRRITATION: Status: RESOLVED | Noted: 2023-03-30 | Resolved: 2023-04-21

## 2023-04-21 PROBLEM — R30.0 DYSURIA: Status: RESOLVED | Noted: 2023-03-30 | Resolved: 2023-04-21

## 2023-04-21 PROBLEM — R55 NEAR SYNCOPE: Status: RESOLVED | Noted: 2023-03-30 | Resolved: 2023-04-21

## 2023-04-21 PROBLEM — B36.9 FUNGAL DERMATITIS: Status: RESOLVED | Noted: 2023-03-30 | Resolved: 2023-04-21

## 2023-04-21 PROBLEM — H61.21 IMPACTED CERUMEN OF RIGHT EAR: Status: RESOLVED | Noted: 2023-03-30 | Resolved: 2023-04-21

## 2023-04-21 PROBLEM — R60.0 EDEMA OF EXTREMITIES: Status: RESOLVED | Noted: 2023-03-30 | Resolved: 2023-04-21

## 2023-04-21 PROBLEM — R41.3 MEMORY LOSS: Status: RESOLVED | Noted: 2023-03-30 | Resolved: 2023-04-21

## 2023-04-21 PROBLEM — M79.89 SWELLING OF LEFT FOOT: Status: RESOLVED | Noted: 2023-03-30 | Resolved: 2023-04-21

## 2023-04-21 PROBLEM — E86.0 XEROSTOMIA DUE TO DEHYDRATION: Status: RESOLVED | Noted: 2023-03-30 | Resolved: 2023-04-21

## 2023-04-21 PROBLEM — M79.676 PAIN IN TOE: Status: RESOLVED | Noted: 2023-03-30 | Resolved: 2023-04-21

## 2023-04-21 PROBLEM — B35.6 TINEA CRURIS: Status: RESOLVED | Noted: 2023-03-30 | Resolved: 2023-04-21

## 2023-04-21 PROBLEM — N39.0 UTI (URINARY TRACT INFECTION): Status: RESOLVED | Noted: 2023-03-30 | Resolved: 2023-04-21

## 2023-04-21 PROBLEM — S39.012A LUMBAR STRAIN, INITIAL ENCOUNTER: Status: RESOLVED | Noted: 2023-03-30 | Resolved: 2023-04-21

## 2023-04-21 PROCEDURE — 3044F HG A1C LEVEL LT 7.0%: CPT | Performed by: INTERNAL MEDICINE

## 2023-04-21 PROCEDURE — 1036F TOBACCO NON-USER: CPT | Performed by: INTERNAL MEDICINE

## 2023-04-21 PROCEDURE — 1160F RVW MEDS BY RX/DR IN RCRD: CPT | Performed by: INTERNAL MEDICINE

## 2023-04-21 PROCEDURE — 1159F MED LIST DOCD IN RCRD: CPT | Performed by: INTERNAL MEDICINE

## 2023-04-21 PROCEDURE — 99215 OFFICE O/P EST HI 40 MIN: CPT | Performed by: INTERNAL MEDICINE

## 2023-04-21 PROCEDURE — 3074F SYST BP LT 130 MM HG: CPT | Performed by: INTERNAL MEDICINE

## 2023-04-21 PROCEDURE — 3066F NEPHROPATHY DOC TX: CPT | Performed by: INTERNAL MEDICINE

## 2023-04-21 PROCEDURE — 3078F DIAST BP <80 MM HG: CPT | Performed by: INTERNAL MEDICINE

## 2023-04-21 PROCEDURE — 4010F ACE/ARB THERAPY RXD/TAKEN: CPT | Performed by: INTERNAL MEDICINE

## 2023-04-21 RX ORDER — CHOLECALCIFEROL (VITAMIN D3) 125 MCG
1 CAPSULE ORAL DAILY
Qty: 90 TABLET | Refills: 3 | Status: SHIPPED | OUTPATIENT
Start: 2023-04-21 | End: 2024-04-20

## 2023-04-21 ASSESSMENT — PATIENT HEALTH QUESTIONNAIRE - PHQ9
2. FEELING DOWN, DEPRESSED OR HOPELESS: NOT AT ALL
SUM OF ALL RESPONSES TO PHQ9 QUESTIONS 1 & 2: 0
1. LITTLE INTEREST OR PLEASURE IN DOING THINGS: NOT AT ALL

## 2023-04-21 NOTE — PROGRESS NOTES
"Subjective   Patient ID: Elma Hernandez is a 77 y.o. female who presents for OTHER (Muscle weakness, feels BP is low , ).  LE feel weak when she is walking.  Present for the past few weeks.  No progressive, but not getting better.   Chronic LE neuropathy.  She has been out of her B 12 tablets.    Admits to some fatigue and occasional dizzy spells.  She denies any issues with CP or SOB.  No focal weakness or neurologic complaint.    Objective   BP (!) 104/46 (BP Location: Right arm, Patient Position: Sitting, BP Cuff Size: Adult)   Pulse 78   Temp 36.7 °C (98 °F) (Temporal)   Resp 16   Ht 1.638 m (5' 4.5\")   Wt 78.5 kg (173 lb)   BMI 29.24 kg/m²     Physical Exam  Vitals reviewed.   Constitutional:       Appearance: Normal appearance.   HENT:      Head: Normocephalic.   Cardiovascular:      Rate and Rhythm: Normal rate and regular rhythm.      Heart sounds: Normal heart sounds.   Pulmonary:      Effort: Pulmonary effort is normal.      Breath sounds: Normal breath sounds.   Musculoskeletal:         General: Normal range of motion.   Neurological:      General: No focal deficit present.      Mental Status: She is alert.   Psychiatric:         Mood and Affect: Mood normal.         Assessment/Plan   Problem List Items Addressed This Visit          Circulatory    Essential hypertension       Endocrine/Metabolic    Thyroid disease - Primary     Other Visit Diagnoses       STEPHANIE (acute kidney injury) (CMS/[facility])        Relevant Orders    Basic Metabolic Panel    PAD (peripheral artery disease) (CMS/[facility])        Relevant Orders    Vascular US PVR with exercise    B12 deficiency        Relevant Medications    mecobalamin, vitamin B12, 1,000 mcg tablet,disintegrating        We reviewed and discussed all of the above as well as her recent lab results.  Her compliance with her medications has been much better.    Thyroid is controlled.    HTG is best it has been in long time.  Continue current medications.    She has long " standing HTN, but lately lower Bps.  For HTN - we will stop amlodipine (5mg)  If BP rises above 130-140 we will restart at 2.5 mg  LE weakness with distance walking.  Could be consistent with claudication.  Hx of PAD and multiple risk factors.  Most recent labs were reviewed.  A1c is better.  Lipids and Triglycerides are also much better.  No medication changes for this, but due to symptoms, history and risks we will proceed with PVR of Legs.    We also discussed her renal function.  She admits to not drinking much fluids through the day.  We will stop her amlodipine.  Stressed need for increasing fluids (sugar free fluids).   We will recheck in approximately 4 days.  We will have her monitor her BP.  If it increases above 130/140 we will restart the amlodipine at half the dose.    We will proceed with the above plan and see her back in 1 month - sooner if any issues.

## 2023-04-25 ENCOUNTER — TELEPHONE (OUTPATIENT)
Dept: PEDIATRICS | Facility: CLINIC | Age: 78
End: 2023-04-25

## 2023-04-25 ENCOUNTER — TELEPHONE (OUTPATIENT)
Dept: PRIMARY CARE | Facility: CLINIC | Age: 78
End: 2023-04-25

## 2023-04-25 ENCOUNTER — LAB (OUTPATIENT)
Dept: LAB | Facility: LAB | Age: 78
End: 2023-04-25
Payer: MEDICARE

## 2023-04-25 DIAGNOSIS — N17.9 AKI (ACUTE KIDNEY INJURY) (CMS-HCC): ICD-10-CM

## 2023-04-25 LAB
ANION GAP IN SER/PLAS: 9 MMOL/L (ref 10–20)
CALCIUM (MG/DL) IN SER/PLAS: 9.4 MG/DL (ref 8.6–10.3)
CARBON DIOXIDE, TOTAL (MMOL/L) IN SER/PLAS: 28 MMOL/L (ref 21–32)
CHLORIDE (MMOL/L) IN SER/PLAS: 101 MMOL/L (ref 98–107)
CREATININE (MG/DL) IN SER/PLAS: 1.75 MG/DL (ref 0.5–1.05)
GFR FEMALE: 30 ML/MIN/1.73M2
GLUCOSE (MG/DL) IN SER/PLAS: 105 MG/DL (ref 74–99)
POTASSIUM (MMOL/L) IN SER/PLAS: 4.7 MMOL/L (ref 3.5–5.3)
SODIUM (MMOL/L) IN SER/PLAS: 133 MMOL/L (ref 136–145)
UREA NITROGEN (MG/DL) IN SER/PLAS: 36 MG/DL (ref 6–23)

## 2023-04-25 PROCEDURE — 80048 BASIC METABOLIC PNL TOTAL CA: CPT

## 2023-04-25 PROCEDURE — 36415 COLL VENOUS BLD VENIPUNCTURE: CPT

## 2023-04-25 NOTE — TELEPHONE ENCOUNTER
Patient states she was supposed to have blood work to test electrolytes before colonoscopy which she did yesterday. Pt states her colonoscopy is Friday and wants to know if electrolytes are ok to continue with procedure

## 2023-04-26 ENCOUNTER — TELEPHONE (OUTPATIENT)
Dept: PRIMARY CARE | Facility: CLINIC | Age: 78
End: 2023-04-26
Payer: MEDICARE

## 2023-04-26 DIAGNOSIS — N17.9 AKI (ACUTE KIDNEY INJURY) (CMS-HCC): Primary | ICD-10-CM

## 2023-04-26 NOTE — TELEPHONE ENCOUNTER
Pt is aware of abnormal test results and follow needed. ----- Message from Bennie Traylor DO sent at 4/26/2023 12:51 PM EDT -----  Kidney blood test is only minimally improved.  She needs to continue with hydratino and we need to repeat blood tests again in a week and check a kidney ultrasound - both ordered.

## 2023-04-28 DIAGNOSIS — I10 ESSENTIAL HYPERTENSION: Primary | ICD-10-CM

## 2023-04-28 RX ORDER — CARVEDILOL 12.5 MG/1
TABLET ORAL
Qty: 180 TABLET | Refills: 0 | Status: SHIPPED | OUTPATIENT
Start: 2023-04-28 | End: 2023-05-19 | Stop reason: SDUPTHER

## 2023-05-03 ENCOUNTER — TELEPHONE (OUTPATIENT)
Dept: PRIMARY CARE | Facility: CLINIC | Age: 78
End: 2023-05-03

## 2023-05-03 ENCOUNTER — LAB (OUTPATIENT)
Dept: LAB | Facility: LAB | Age: 78
End: 2023-05-03
Payer: MEDICARE

## 2023-05-03 DIAGNOSIS — N18.32 STAGE 3B CHRONIC KIDNEY DISEASE (MULTI): Primary | ICD-10-CM

## 2023-05-03 DIAGNOSIS — N17.9 AKI (ACUTE KIDNEY INJURY) (CMS-HCC): ICD-10-CM

## 2023-05-03 LAB
ANION GAP IN SER/PLAS: 12 MMOL/L (ref 10–20)
CALCIUM (MG/DL) IN SER/PLAS: 9.6 MG/DL (ref 8.6–10.3)
CARBON DIOXIDE, TOTAL (MMOL/L) IN SER/PLAS: 27 MMOL/L (ref 21–32)
CHLORIDE (MMOL/L) IN SER/PLAS: 98 MMOL/L (ref 98–107)
CREATININE (MG/DL) IN SER/PLAS: 1.9 MG/DL (ref 0.5–1.05)
GFR FEMALE: 27 ML/MIN/1.73M2
GLUCOSE (MG/DL) IN SER/PLAS: 133 MG/DL (ref 74–99)
POTASSIUM (MMOL/L) IN SER/PLAS: 4.7 MMOL/L (ref 3.5–5.3)
SODIUM (MMOL/L) IN SER/PLAS: 132 MMOL/L (ref 136–145)
UREA NITROGEN (MG/DL) IN SER/PLAS: 35 MG/DL (ref 6–23)

## 2023-05-03 PROCEDURE — 80048 BASIC METABOLIC PNL TOTAL CA: CPT

## 2023-05-03 PROCEDURE — 36415 COLL VENOUS BLD VENIPUNCTURE: CPT

## 2023-05-03 NOTE — RESULT ENCOUNTER NOTE
Elma's kidneys are still up higher than they should be (but getting worse).  I would like her to see a kidney specialist for further recommendations.

## 2023-05-03 NOTE — TELEPHONE ENCOUNTER
Pt is aware of abnormal test results and follow needed. ----- Message from Bennie Traylor DO sent at 5/3/2023 12:44 PM EDT -----  Elma's kidneys are still up higher than they should be (but getting worse).  I would like her to see a kidney specialist for further recommendations.

## 2023-05-04 ENCOUNTER — TELEPHONE (OUTPATIENT)
Dept: PRIMARY CARE | Facility: CLINIC | Age: 78
End: 2023-05-04
Payer: MEDICARE

## 2023-05-04 NOTE — TELEPHONE ENCOUNTER
Pt aware of normal test results . ----- Message from Bennie Traylor DO sent at 5/3/2023  5:31 PM EDT -----  Kidney ultrasound looks ok.

## 2023-05-09 DIAGNOSIS — K21.9 GASTROESOPHAGEAL REFLUX DISEASE, UNSPECIFIED WHETHER ESOPHAGITIS PRESENT: Primary | ICD-10-CM

## 2023-05-09 DIAGNOSIS — E11.42 DIABETIC POLYNEUROPATHY ASSOCIATED WITH TYPE 2 DIABETES MELLITUS (MULTI): ICD-10-CM

## 2023-05-10 RX ORDER — OMEPRAZOLE 40 MG/1
CAPSULE, DELAYED RELEASE ORAL
Qty: 90 CAPSULE | Refills: 0 | Status: SHIPPED | OUTPATIENT
Start: 2023-05-10 | End: 2023-10-30 | Stop reason: SDUPTHER

## 2023-05-10 RX ORDER — GABAPENTIN 100 MG/1
CAPSULE ORAL
Qty: 180 CAPSULE | Refills: 0 | Status: SHIPPED | OUTPATIENT
Start: 2023-05-10 | End: 2023-05-19 | Stop reason: SDUPTHER

## 2023-05-15 ENCOUNTER — TELEPHONE (OUTPATIENT)
Dept: PRIMARY CARE | Facility: CLINIC | Age: 78
End: 2023-05-15
Payer: MEDICARE

## 2023-05-15 NOTE — TELEPHONE ENCOUNTER
Pt is aware of abnormal test results and follow needed. ----- Message from Bennie Traylor DO sent at 5/15/2023  1:01 PM EDT -----  So her test on her leg circulation does show some progression / worsening from her previous study.  I would like her to follow up with her vascular doctor.

## 2023-05-19 ENCOUNTER — OFFICE VISIT (OUTPATIENT)
Dept: PRIMARY CARE | Facility: CLINIC | Age: 78
End: 2023-05-19
Payer: MEDICARE

## 2023-05-19 VITALS
OXYGEN SATURATION: 97 % | TEMPERATURE: 97 F | SYSTOLIC BLOOD PRESSURE: 130 MMHG | HEIGHT: 64 IN | HEART RATE: 68 BPM | RESPIRATION RATE: 15 BRPM | BODY MASS INDEX: 29.37 KG/M2 | WEIGHT: 172 LBS | DIASTOLIC BLOOD PRESSURE: 80 MMHG

## 2023-05-19 DIAGNOSIS — I10 PRIMARY HYPERTENSION: Primary | ICD-10-CM

## 2023-05-19 DIAGNOSIS — E78.5 HYPERLIPIDEMIA, UNSPECIFIED HYPERLIPIDEMIA TYPE: ICD-10-CM

## 2023-05-19 DIAGNOSIS — N18.30 STAGE 3 CHRONIC KIDNEY DISEASE, UNSPECIFIED WHETHER STAGE 3A OR 3B CKD (MULTI): ICD-10-CM

## 2023-05-19 DIAGNOSIS — D53.9 ANEMIA, DEFICIENCY: ICD-10-CM

## 2023-05-19 DIAGNOSIS — J31.0 RHINITIS, UNSPECIFIED TYPE: ICD-10-CM

## 2023-05-19 DIAGNOSIS — E08.42 DIABETIC POLYNEUROPATHY ASSOCIATED WITH DIABETES MELLITUS DUE TO UNDERLYING CONDITION (MULTI): ICD-10-CM

## 2023-05-19 DIAGNOSIS — I73.9 PERIPHERAL VASCULAR DISEASE (CMS-HCC): ICD-10-CM

## 2023-05-19 DIAGNOSIS — E11.649 HYPOGLYCEMIA DUE TO TYPE 2 DIABETES MELLITUS (MULTI): ICD-10-CM

## 2023-05-19 DIAGNOSIS — E11.42 DIABETIC POLYNEUROPATHY ASSOCIATED WITH TYPE 2 DIABETES MELLITUS (MULTI): ICD-10-CM

## 2023-05-19 DIAGNOSIS — I10 ESSENTIAL HYPERTENSION: ICD-10-CM

## 2023-05-19 PROBLEM — K31.7 GASTRIC POLYP: Status: RESOLVED | Noted: 2023-03-30 | Resolved: 2023-05-19

## 2023-05-19 PROBLEM — R42 DIZZINESS: Status: RESOLVED | Noted: 2023-03-30 | Resolved: 2023-05-19

## 2023-05-19 PROBLEM — R00.2 PALPITATIONS: Status: RESOLVED | Noted: 2023-03-30 | Resolved: 2023-05-19

## 2023-05-19 LAB
APPEARANCE, URINE: CLEAR
BILIRUBIN, URINE: NEGATIVE
BLOOD, URINE: NEGATIVE
COLOR, URINE: ABNORMAL
CREATININE (MG/DL) IN URINE: 36.9 MG/DL (ref 20–320)
GLUCOSE, URINE: NEGATIVE MG/DL
KETONES, URINE: NEGATIVE MG/DL
LEUKOCYTE ESTERASE, URINE: ABNORMAL
NITRITE, URINE: NEGATIVE
PH, URINE: 7 (ref 5–8)
PROTEIN (MG/DL) IN URINE: 15 MG/DL (ref 5–24)
PROTEIN, URINE: NEGATIVE MG/DL
PROTEIN/CREATININE (MG/MG) IN URINE: 0.41 MG/MG CREAT (ref 0–0.17)
RBC, URINE: ABNORMAL /HPF (ref 0–5)
SPECIFIC GRAVITY, URINE: 1.01 (ref 1–1.03)
UROBILINOGEN, URINE: <2 MG/DL (ref 0–1.9)
WBC, URINE: ABNORMAL /HPF (ref 0–5)

## 2023-05-19 PROCEDURE — 3044F HG A1C LEVEL LT 7.0%: CPT | Performed by: INTERNAL MEDICINE

## 2023-05-19 PROCEDURE — 3079F DIAST BP 80-89 MM HG: CPT | Performed by: INTERNAL MEDICINE

## 2023-05-19 PROCEDURE — 99214 OFFICE O/P EST MOD 30 MIN: CPT | Performed by: INTERNAL MEDICINE

## 2023-05-19 PROCEDURE — 1159F MED LIST DOCD IN RCRD: CPT | Performed by: INTERNAL MEDICINE

## 2023-05-19 PROCEDURE — 4010F ACE/ARB THERAPY RXD/TAKEN: CPT | Performed by: INTERNAL MEDICINE

## 2023-05-19 PROCEDURE — 3066F NEPHROPATHY DOC TX: CPT | Performed by: INTERNAL MEDICINE

## 2023-05-19 PROCEDURE — 1036F TOBACCO NON-USER: CPT | Performed by: INTERNAL MEDICINE

## 2023-05-19 PROCEDURE — 1160F RVW MEDS BY RX/DR IN RCRD: CPT | Performed by: INTERNAL MEDICINE

## 2023-05-19 PROCEDURE — 3075F SYST BP GE 130 - 139MM HG: CPT | Performed by: INTERNAL MEDICINE

## 2023-05-19 RX ORDER — GABAPENTIN 100 MG/1
100 CAPSULE ORAL 2 TIMES DAILY
Qty: 180 CAPSULE | Refills: 3 | Status: SHIPPED | OUTPATIENT
Start: 2023-05-19 | End: 2023-10-03 | Stop reason: SDUPTHER

## 2023-05-19 RX ORDER — ATORVASTATIN CALCIUM 10 MG/1
10 TABLET, FILM COATED ORAL DAILY
Qty: 90 TABLET | Refills: 3 | Status: SHIPPED | OUTPATIENT
Start: 2023-05-19 | End: 2023-10-23 | Stop reason: SDUPTHER

## 2023-05-19 RX ORDER — DULOXETIN HYDROCHLORIDE 60 MG/1
60 CAPSULE, DELAYED RELEASE ORAL DAILY
Qty: 90 CAPSULE | Refills: 3 | Status: SHIPPED | OUTPATIENT
Start: 2023-05-19 | End: 2023-08-29

## 2023-05-19 RX ORDER — FENOFIBRATE 160 MG/1
160 TABLET ORAL DAILY
Qty: 90 TABLET | Refills: 3 | Status: SHIPPED | OUTPATIENT
Start: 2023-05-19 | End: 2024-05-18

## 2023-05-19 RX ORDER — CARVEDILOL 12.5 MG/1
12.5 TABLET ORAL 2 TIMES DAILY
Qty: 180 TABLET | Refills: 3 | Status: SHIPPED | OUTPATIENT
Start: 2023-05-19 | End: 2023-10-04 | Stop reason: SDUPTHER

## 2023-05-19 ASSESSMENT — ENCOUNTER SYMPTOMS
HYPERTENSION: 1
SHORTNESS OF BREATH: 0
ABDOMINAL PAIN: 0
CONSTIPATION: 0
WHEEZING: 0
PALPITATIONS: 0
COUGH: 0

## 2023-05-19 NOTE — PROGRESS NOTES
"Subjective   Patient ID: Elma Hernandez is a 77 y.o. female who presents for Hypertension (Follow up , htn  and lab work.).    Hypertension  Pertinent negatives include no chest pain, palpitations or shortness of breath.    She has been having progressive CKD as of late.  She met with a nephrologist, but did not particularly mesh with him.    She has DM and admits to getting hypoglycemic events.  She has not adjusted her insulin.      Review of Systems   Respiratory:  Negative for cough, shortness of breath and wheezing.    Cardiovascular:  Negative for chest pain and palpitations.   Gastrointestinal:  Negative for abdominal pain and constipation.       Objective   /80 (BP Location: Left arm, Patient Position: Sitting, BP Cuff Size: Adult)   Pulse 68   Temp 36.1 °C (97 °F) (Tympanic)   Resp 15   Ht 1.626 m (5' 4\")   Wt 78 kg (172 lb)   SpO2 97%   BMI 29.52 kg/m²     Physical Exam  Vitals reviewed.   Constitutional:       Appearance: Normal appearance.   HENT:      Head: Normocephalic.   Cardiovascular:      Rate and Rhythm: Normal rate.   Pulmonary:      Effort: Pulmonary effort is normal.   Musculoskeletal:         General: Normal range of motion.   Neurological:      General: No focal deficit present.      Mental Status: She is alert.   Psychiatric:         Mood and Affect: Mood normal.         Assessment/Plan   Problem List Items Addressed This Visit          Nervous    Diabetic neuropathy (CMS/HCC)    Relevant Medications    gabapentin (Neurontin) 100 mg capsule    DULoxetine (Cymbalta) 60 mg DR capsule       Circulatory    Essential hypertension    Relevant Medications    carvedilol (Coreg) 12.5 mg tablet    Peripheral vascular disease (CMS/HCC)       Hematologic    Anemia, deficiency     Other Visit Diagnoses       Primary hypertension    -  Primary    Stage 3 chronic kidney disease, unspecified whether stage 3a or 3b CKD (CMS/HCC)        BMI 29.0-29.9,adult        Rhinitis, unspecified type     "    Hyperlipidemia, unspecified hyperlipidemia type        Relevant Medications    fenofibrate (Triglide) 160 mg tablet    atorvastatin (Lipitor) 10 mg tablet    Hypoglycemia due to type 2 diabetes mellitus (CMS/HCC)            Multiple ongoing issues.    HTN controlled.  Recent lipids/triglycerides are also stable.  No changes in medications.    + hypoglycemia.  Discussed cutting back on insulin by 6 units to start and she needs to contact her endocrinologist.    Discussed her progressive CKD - stressed need for good fluid intake as well as avoidance of NSAIDs.    Follow up in 3 months - sooner if any issues.

## 2023-06-12 DIAGNOSIS — J45.20 MILD INTERMITTENT REACTIVE AIRWAY DISEASE WITHOUT COMPLICATION (HHS-HCC): Primary | ICD-10-CM

## 2023-06-12 RX ORDER — ALBUTEROL SULFATE 90 UG/1
POWDER, METERED RESPIRATORY (INHALATION)
Qty: 0.65 G | Refills: 11 | Status: SHIPPED | OUTPATIENT
Start: 2023-06-12

## 2023-07-14 ENCOUNTER — OFFICE VISIT (OUTPATIENT)
Dept: PRIMARY CARE | Facility: CLINIC | Age: 78
End: 2023-07-14
Payer: MEDICARE

## 2023-07-14 VITALS
HEART RATE: 70 BPM | BODY MASS INDEX: 29.53 KG/M2 | TEMPERATURE: 97.6 F | HEIGHT: 64 IN | WEIGHT: 173 LBS | OXYGEN SATURATION: 98 % | DIASTOLIC BLOOD PRESSURE: 70 MMHG | SYSTOLIC BLOOD PRESSURE: 130 MMHG | RESPIRATION RATE: 14 BRPM

## 2023-07-14 DIAGNOSIS — R21 FACIAL RASH: ICD-10-CM

## 2023-07-14 DIAGNOSIS — I73.9 INTERMITTENT CLAUDICATION (CMS-HCC): ICD-10-CM

## 2023-07-14 DIAGNOSIS — I10 ESSENTIAL HYPERTENSION: Primary | ICD-10-CM

## 2023-07-14 DIAGNOSIS — R35.0 URINARY FREQUENCY: ICD-10-CM

## 2023-07-14 LAB
POC APPEARANCE, URINE: CLEAR
POC BILIRUBIN, URINE: NEGATIVE
POC BLOOD, URINE: NEGATIVE
POC COLOR, URINE: ABNORMAL
POC GLUCOSE, URINE: NEGATIVE MG/DL
POC KETONES, URINE: NEGATIVE MG/DL
POC LEUKOCYTES, URINE: ABNORMAL
POC NITRITE,URINE: NEGATIVE
POC PH, URINE: 8 PH
POC PROTEIN, URINE: NEGATIVE MG/DL
POC SPECIFIC GRAVITY, URINE: 1.01
POC UROBILINOGEN, URINE: 1 EU/DL

## 2023-07-14 PROCEDURE — 1160F RVW MEDS BY RX/DR IN RCRD: CPT | Performed by: INTERNAL MEDICINE

## 2023-07-14 PROCEDURE — 81002 URINALYSIS NONAUTO W/O SCOPE: CPT | Performed by: INTERNAL MEDICINE

## 2023-07-14 PROCEDURE — 1036F TOBACCO NON-USER: CPT | Performed by: INTERNAL MEDICINE

## 2023-07-14 PROCEDURE — 1159F MED LIST DOCD IN RCRD: CPT | Performed by: INTERNAL MEDICINE

## 2023-07-14 PROCEDURE — 99214 OFFICE O/P EST MOD 30 MIN: CPT | Performed by: INTERNAL MEDICINE

## 2023-07-14 PROCEDURE — 3075F SYST BP GE 130 - 139MM HG: CPT | Performed by: INTERNAL MEDICINE

## 2023-07-14 PROCEDURE — 3078F DIAST BP <80 MM HG: CPT | Performed by: INTERNAL MEDICINE

## 2023-07-14 PROCEDURE — 1125F AMNT PAIN NOTED PAIN PRSNT: CPT | Performed by: INTERNAL MEDICINE

## 2023-07-14 RX ORDER — METRONIDAZOLE 7.5 MG/G
GEL TOPICAL 2 TIMES DAILY
Qty: 45 G | Refills: 2 | Status: SHIPPED | OUTPATIENT
Start: 2023-07-14 | End: 2023-10-04 | Stop reason: ALTCHOICE

## 2023-07-14 RX ORDER — AMOXICILLIN 875 MG/1
875 TABLET, FILM COATED ORAL 2 TIMES DAILY
Qty: 6 TABLET | Refills: 0 | Status: SHIPPED | OUTPATIENT
Start: 2023-07-14 | End: 2023-07-17

## 2023-07-14 ASSESSMENT — ENCOUNTER SYMPTOMS
SORE THROAT: 1
COUGH: 0
SHORTNESS OF BREATH: 0
WHEEZING: 0

## 2023-07-14 NOTE — PROGRESS NOTES
"Subjective   Patient ID: Elma Hernandez is a 78 y.o. female who presents for Sore Throat (Sore throat x 2 days.).    Sore Throat   Pertinent negatives include no coughing or shortness of breath.    Claudication - has not started medication.    Urinary frequency without fever.    She has claudication, but tolerable.  She has not started her pletal as recommended by vascular.      Review of Systems   HENT:  Positive for sore throat.    Respiratory:  Negative for cough, shortness of breath and wheezing.        Objective   /70 (BP Location: Left arm, Patient Position: Sitting, BP Cuff Size: Adult)   Pulse 70   Temp 36.4 °C (97.6 °F) (Tympanic)   Resp 14   Ht 1.626 m (5' 4\")   Wt 78.5 kg (173 lb)   SpO2 98%   BMI 29.70 kg/m²     Physical Exam  Vitals reviewed.   Constitutional:       Appearance: Normal appearance.   HENT:      Head: Normocephalic.      Mouth/Throat:      Pharynx: Oropharynx is clear.   Cardiovascular:      Rate and Rhythm: Normal rate.   Pulmonary:      Effort: Pulmonary effort is normal.   Musculoskeletal:         General: Normal range of motion.   Skin:     General: Skin is dry.      Comments: Mild rash of face.    Neurological:      General: No focal deficit present.      Mental Status: She is alert.   Psychiatric:         Mood and Affect: Mood normal.         Assessment/Plan   Problem List Items Addressed This Visit       Essential hypertension - Primary     Other Visit Diagnoses       Intermittent claudication (CMS/HCC)        Facial rash        Relevant Medications    metroNIDAZOLE (Metrogel) 0.75 % gel    Urinary frequency        Relevant Medications    amoxicillin (Amoxil) 875 mg tablet    Other Relevant Orders    POCT UA (nonautomated) manually resulted (Completed)        Discussed all of the above.    HTN controlled.   Mild UTI.  Discussed treatment.    Discussed pletal and benefit of improving symptoms.             "

## 2023-07-20 ENCOUNTER — TELEPHONE (OUTPATIENT)
Dept: PRIMARY CARE | Facility: CLINIC | Age: 78
End: 2023-07-20
Payer: MEDICARE

## 2023-07-20 NOTE — TELEPHONE ENCOUNTER
[place]  Took x3 days supply Amoxicillin.  Still having pressure.  Questioned if med can be extended/new Rx? Requesting call back.    Requesting to [facility] [phone].

## 2023-07-21 DIAGNOSIS — N39.0 URINARY TRACT INFECTION WITHOUT HEMATURIA, SITE UNSPECIFIED: Primary | ICD-10-CM

## 2023-07-21 RX ORDER — DOXYCYCLINE 100 MG/1
100 CAPSULE ORAL 2 TIMES DAILY
Qty: 14 CAPSULE | Refills: 0 | Status: SHIPPED | OUTPATIENT
Start: 2023-07-21 | End: 2023-07-28

## 2023-08-11 ENCOUNTER — OFFICE VISIT (OUTPATIENT)
Dept: PRIMARY CARE | Facility: CLINIC | Age: 78
End: 2023-08-11
Payer: MEDICARE

## 2023-08-11 VITALS
OXYGEN SATURATION: 98 % | HEART RATE: 68 BPM | TEMPERATURE: 98.2 F | SYSTOLIC BLOOD PRESSURE: 100 MMHG | WEIGHT: 172 LBS | BODY MASS INDEX: 29.37 KG/M2 | RESPIRATION RATE: 14 BRPM | HEIGHT: 64 IN | DIASTOLIC BLOOD PRESSURE: 60 MMHG

## 2023-08-11 DIAGNOSIS — I10 ESSENTIAL (PRIMARY) HYPERTENSION: Primary | ICD-10-CM

## 2023-08-11 DIAGNOSIS — I73.9 PERIPHERAL VASCULAR DISEASE (CMS-HCC): ICD-10-CM

## 2023-08-11 PROCEDURE — 1160F RVW MEDS BY RX/DR IN RCRD: CPT | Performed by: INTERNAL MEDICINE

## 2023-08-11 PROCEDURE — 1036F TOBACCO NON-USER: CPT | Performed by: INTERNAL MEDICINE

## 2023-08-11 PROCEDURE — 3074F SYST BP LT 130 MM HG: CPT | Performed by: INTERNAL MEDICINE

## 2023-08-11 PROCEDURE — 99213 OFFICE O/P EST LOW 20 MIN: CPT | Performed by: INTERNAL MEDICINE

## 2023-08-11 PROCEDURE — 3078F DIAST BP <80 MM HG: CPT | Performed by: INTERNAL MEDICINE

## 2023-08-11 PROCEDURE — 1125F AMNT PAIN NOTED PAIN PRSNT: CPT | Performed by: INTERNAL MEDICINE

## 2023-08-11 PROCEDURE — 1159F MED LIST DOCD IN RCRD: CPT | Performed by: INTERNAL MEDICINE

## 2023-08-11 RX ORDER — ALLOPURINOL 100 MG/1
100 TABLET ORAL
COMMUNITY
Start: 2023-06-20 | End: 2023-10-04 | Stop reason: ALTCHOICE

## 2023-08-11 RX ORDER — CILOSTAZOL 100 MG/1
100 TABLET ORAL 2 TIMES DAILY
COMMUNITY
Start: 2023-06-21 | End: 2023-10-04 | Stop reason: ALTCHOICE

## 2023-08-11 ASSESSMENT — ENCOUNTER SYMPTOMS
PALPITATIONS: 0
COUGH: 0
DIARRHEA: 0
HYPERTENSION: 1
SHORTNESS OF BREATH: 0
WHEEZING: 0
ABDOMINAL PAIN: 0
CONSTIPATION: 0

## 2023-08-11 NOTE — PROGRESS NOTES
"Subjective   Patient ID: Elma Hernandez is a 78 y.o. female who presents for Hypertension.    Hypertension  Pertinent negatives include no chest pain, palpitations or shortness of breath.   Blood pressures at home have been low.  She has chronic balance issues and vertigo, this does not seem to be worse but she has had issues with her renal function.      Review of Systems   Respiratory:  Negative for cough, shortness of breath and wheezing.    Cardiovascular:  Negative for chest pain and palpitations.   Gastrointestinal:  Negative for abdominal pain, constipation and diarrhea.       Objective   /60 (BP Location: Left arm, Patient Position: Sitting, BP Cuff Size: Adult)   Pulse 68   Temp 36.8 °C (98.2 °F) (Tympanic)   Resp 14   Ht 1.626 m (5' 4\")   Wt 78 kg (172 lb)   SpO2 98%   BMI 29.52 kg/m²     Physical Exam  Vitals reviewed.   Constitutional:       Appearance: Normal appearance.   HENT:      Head: Normocephalic.   Cardiovascular:      Rate and Rhythm: Normal rate.   Pulmonary:      Effort: Pulmonary effort is normal.   Musculoskeletal:         General: Normal range of motion.   Neurological:      General: No focal deficit present.      Mental Status: She is alert.   Psychiatric:         Mood and Affect: Mood normal.       Assessment/Plan   Problem List Items Addressed This Visit       Peripheral vascular disease (CMS/HCC)     Other Visit Diagnoses       Essential (primary) hypertension    -  Primary        We discussed the above and the benefit of her new medication (Pletal).  Her BP has been low.  We did review and discuss her lab results.    For the present time we will cut her benazapril in half.  We also stressed increasing her fluids.  She has scheduled a follow up blood test.   We will see her back in 2 months- sooner if any issues.        "

## 2023-08-18 DIAGNOSIS — I10 ESSENTIAL HYPERTENSION: Primary | ICD-10-CM

## 2023-08-22 RX ORDER — AMLODIPINE BESYLATE 5 MG/1
5 TABLET ORAL DAILY
Qty: 90 TABLET | Refills: 0 | Status: SHIPPED | OUTPATIENT
Start: 2023-08-22 | End: 2023-10-04 | Stop reason: ALTCHOICE

## 2023-08-29 ENCOUNTER — OFFICE VISIT (OUTPATIENT)
Dept: PRIMARY CARE | Facility: CLINIC | Age: 78
End: 2023-08-29
Payer: MEDICARE

## 2023-08-29 VITALS
OXYGEN SATURATION: 98 % | DIASTOLIC BLOOD PRESSURE: 84 MMHG | TEMPERATURE: 98.4 F | WEIGHT: 170 LBS | BODY MASS INDEX: 29.18 KG/M2 | RESPIRATION RATE: 16 BRPM | HEART RATE: 59 BPM | SYSTOLIC BLOOD PRESSURE: 138 MMHG

## 2023-08-29 DIAGNOSIS — I10 ESSENTIAL HYPERTENSION: ICD-10-CM

## 2023-08-29 DIAGNOSIS — F32.0 CURRENT MILD EPISODE OF MAJOR DEPRESSIVE DISORDER, UNSPECIFIED WHETHER RECURRENT (CMS-HCC): ICD-10-CM
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
## 2023-08-29 DIAGNOSIS — J31.0 RHINITIS, UNSPECIFIED TYPE: ICD-10-CM

## 2023-08-29 DIAGNOSIS — F41.9 ANXIETY: Primary | ICD-10-CM

## 2023-08-29 DIAGNOSIS — I73.9 PAD (PERIPHERAL ARTERY DISEASE) (CMS-HCC): ICD-10-CM

## 2023-08-29 PROCEDURE — 3079F DIAST BP 80-89 MM HG: CPT | Performed by: INTERNAL MEDICINE

## 2023-08-29 PROCEDURE — 99214 OFFICE O/P EST MOD 30 MIN: CPT | Performed by: INTERNAL MEDICINE

## 2023-08-29 PROCEDURE — 1125F AMNT PAIN NOTED PAIN PRSNT: CPT | Performed by: INTERNAL MEDICINE

## 2023-08-29 PROCEDURE — 90677 PCV20 VACCINE IM: CPT | Performed by: INTERNAL MEDICINE

## 2023-08-29 PROCEDURE — G0009 ADMIN PNEUMOCOCCAL VACCINE: HCPCS | Performed by: INTERNAL MEDICINE

## 2023-08-29 PROCEDURE — 1159F MED LIST DOCD IN RCRD: CPT | Performed by: INTERNAL MEDICINE

## 2023-08-29 PROCEDURE — 1036F TOBACCO NON-USER: CPT | Performed by: INTERNAL MEDICINE

## 2023-08-29 PROCEDURE — 3075F SYST BP GE 130 - 139MM HG: CPT | Performed by: INTERNAL MEDICINE

## 2023-08-29 PROCEDURE — 1160F RVW MEDS BY RX/DR IN RCRD: CPT | Performed by: INTERNAL MEDICINE

## 2023-08-29 RX ORDER — BUPROPION HYDROCHLORIDE 150 MG/1
150 TABLET ORAL EVERY MORNING
Qty: 30 TABLET | Refills: 1 | Status: SHIPPED | OUTPATIENT
Start: 2023-08-29 | End: 2023-10-04 | Stop reason: SDUPTHER
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
## 2023-09-01 ENCOUNTER — TELEPHONE (OUTPATIENT)
Dept: PRIMARY CARE | Facility: CLINIC | Age: 78
End: 2023-09-01
Payer: MEDICARE

## 2023-09-15 LAB
ALBUMIN (MG/L) IN URINE: 47.1 MG/L
ALBUMIN/CREATININE (UG/MG) IN URINE: 97.5 UG/MG CRT (ref 0–30)
APPEARANCE, URINE: CLEAR
BASOPHILS (10*3/UL) IN BLOOD BY AUTOMATED COUNT: 0.04 X10E9/L (ref 0–0.1)
BASOPHILS/100 LEUKOCYTES IN BLOOD BY AUTOMATED COUNT: 0.7 % (ref 0–2)
BILIRUBIN, URINE: NEGATIVE
BLOOD, URINE: NEGATIVE
CALCIDIOL (25 OH VITAMIN D3) (NG/ML) IN SER/PLAS: 24 NG/ML
COLOR, URINE: NORMAL
CREATININE (MG/DL) IN URINE: 48.3 MG/DL (ref 20–320)
EOSINOPHILS (10*3/UL) IN BLOOD BY AUTOMATED COUNT: 0.15 X10E9/L (ref 0–0.4)
EOSINOPHILS/100 LEUKOCYTES IN BLOOD BY AUTOMATED COUNT: 2.8 % (ref 0–6)
ERYTHROCYTE DISTRIBUTION WIDTH (RATIO) BY AUTOMATED COUNT: 13.3 % (ref 11.5–14.5)
ERYTHROCYTE MEAN CORPUSCULAR HEMOGLOBIN CONCENTRATION (G/DL) BY AUTOMATED: 33.9 G/DL (ref 32–36)
ERYTHROCYTE MEAN CORPUSCULAR VOLUME (FL) BY AUTOMATED COUNT: 81 FL (ref 80–100)
ERYTHROCYTES (10*6/UL) IN BLOOD BY AUTOMATED COUNT: 4.1 X10E12/L (ref 4–5.2)
GLUCOSE, URINE: NEGATIVE MG/DL
HEMATOCRIT (%) IN BLOOD BY AUTOMATED COUNT: 33.3 % (ref 36–46)
HEMOGLOBIN (G/DL) IN BLOOD: 11.3 G/DL (ref 12–16)
IMMATURE GRANULOCYTES/100 LEUKOCYTES IN BLOOD BY AUTOMATED COUNT: 0.6 % (ref 0–0.9)
KETONES, URINE: NEGATIVE MG/DL
LEUKOCYTE ESTERASE, URINE: NEGATIVE
LEUKOCYTES (10*3/UL) IN BLOOD BY AUTOMATED COUNT: 5.4 X10E9/L (ref 4.4–11.3)
LYMPHOCYTES (10*3/UL) IN BLOOD BY AUTOMATED COUNT: 1.81 X10E9/L (ref 0.8–3)
LYMPHOCYTES/100 LEUKOCYTES IN BLOOD BY AUTOMATED COUNT: 33.6 % (ref 13–44)
MAGNESIUM (MG/DL) IN SER/PLAS: 1.72 MG/DL (ref 1.6–2.4)
MONOCYTES (10*3/UL) IN BLOOD BY AUTOMATED COUNT: 0.34 X10E9/L (ref 0.05–0.8)
MONOCYTES/100 LEUKOCYTES IN BLOOD BY AUTOMATED COUNT: 6.3 % (ref 2–10)
NEUTROPHILS (10*3/UL) IN BLOOD BY AUTOMATED COUNT: 3.02 X10E9/L (ref 1.6–5.5)
NEUTROPHILS/100 LEUKOCYTES IN BLOOD BY AUTOMATED COUNT: 56 % (ref 40–80)
NITRITE, URINE: NEGATIVE
NRBC (PER 100 WBCS) BY AUTOMATED COUNT: 0 /100 WBC (ref 0–0)
PARATHYRIN INTACT (PG/ML) IN SER/PLAS: 72.1 PG/ML (ref 18.5–88)
PH, URINE: 6 (ref 5–8)
PLATELETS (10*3/UL) IN BLOOD AUTOMATED COUNT: 191 X10E9/L (ref 150–450)
PROTEIN, URINE: NEGATIVE MG/DL
SPECIFIC GRAVITY, URINE: 1.01 (ref 1–1.03)
URATE (MG/DL) IN SER/PLAS: 6 MG/DL (ref 2.3–6.7)
UROBILINOGEN, URINE: <2 MG/DL (ref 0–1.9)

## 2023-09-29 LAB
ALBUMIN (G/DL) IN SER/PLAS: 4.3 G/DL (ref 3.4–5)
ANION GAP IN SER/PLAS: 12 MMOL/L (ref 10–20)
CALCIUM (MG/DL) IN SER/PLAS: 9.6 MG/DL (ref 8.6–10.3)
CARBON DIOXIDE, TOTAL (MMOL/L) IN SER/PLAS: 23 MMOL/L (ref 21–32)
CHLORIDE (MMOL/L) IN SER/PLAS: 102 MMOL/L (ref 98–107)
CREATININE (MG/DL) IN SER/PLAS: 1.97 MG/DL (ref 0.5–1.05)
GFR FEMALE: 26 ML/MIN/1.73M2
GLUCOSE (MG/DL) IN SER/PLAS: 141 MG/DL (ref 74–99)
PHOSPHATE (MG/DL) IN SER/PLAS: 3.2 MG/DL (ref 2.5–4.9)
POTASSIUM (MMOL/L) IN SER/PLAS: 4.8 MMOL/L (ref 3.5–5.3)
SODIUM (MMOL/L) IN SER/PLAS: 132 MMOL/L (ref 136–145)
UREA NITROGEN (MG/DL) IN SER/PLAS: 33 MG/DL (ref 6–23)

## 2023-10-03 ENCOUNTER — TELEPHONE (OUTPATIENT)
Dept: PRIMARY CARE | Facility: CLINIC | Age: 78
End: 2023-10-03
Payer: MEDICARE

## 2023-10-03 DIAGNOSIS — E11.42 DIABETIC POLYNEUROPATHY ASSOCIATED WITH TYPE 2 DIABETES MELLITUS (MULTI): ICD-10-CM

## 2023-10-03 RX ORDER — GABAPENTIN 100 MG/1
100 CAPSULE ORAL 2 TIMES DAILY
Qty: 180 CAPSULE | Refills: 3 | Status: SHIPPED | OUTPATIENT
Start: 2023-10-03 | End: 2023-10-03 | Stop reason: SDUPTHER

## 2023-10-03 RX ORDER — GABAPENTIN 100 MG/1
100 CAPSULE ORAL 2 TIMES DAILY
Qty: 180 CAPSULE | Refills: 3 | Status: SHIPPED | OUTPATIENT
Start: 2023-10-03 | End: 2024-10-02

## 2023-10-03 NOTE — TELEPHONE ENCOUNTER
Needs to have gabapentin (Neurontin) 100 mg capsule and lorazepam 0.5 refilled. Send to   [id number] - [place], [place] - [address] Phone: [phone]   Fax: [phone]

## 2023-10-04 ENCOUNTER — OFFICE VISIT (OUTPATIENT)
Dept: PRIMARY CARE | Facility: CLINIC | Age: 78
End: 2023-10-04
Payer: MEDICARE

## 2023-10-04 VITALS
RESPIRATION RATE: 14 BRPM | HEIGHT: 64 IN | TEMPERATURE: 98 F | WEIGHT: 168 LBS | HEART RATE: 88 BPM | DIASTOLIC BLOOD PRESSURE: 80 MMHG | SYSTOLIC BLOOD PRESSURE: 130 MMHG | OXYGEN SATURATION: 98 % | BODY MASS INDEX: 28.68 KG/M2

## 2023-10-04 DIAGNOSIS — R79.89 LOW VITAMIN B12 LEVEL: ICD-10-CM

## 2023-10-04 DIAGNOSIS — E11.22 CKD STAGE 3 SECONDARY TO DIABETES (MULTI): ICD-10-CM

## 2023-10-04 DIAGNOSIS — F39 MOOD DISORDER (CMS-HCC): ICD-10-CM

## 2023-10-04 DIAGNOSIS — R26.89 BALANCE DISORDER: ICD-10-CM

## 2023-10-04 DIAGNOSIS — F32.0 CURRENT MILD EPISODE OF MAJOR DEPRESSIVE DISORDER, UNSPECIFIED WHETHER RECURRENT (CMS-HCC): ICD-10-CM

## 2023-10-04 DIAGNOSIS — I73.9 PERIPHERAL VASCULAR DISEASE (CMS-HCC): Primary | ICD-10-CM

## 2023-10-04 DIAGNOSIS — E11.65 TYPE 2 DIABETES MELLITUS WITH HYPERGLYCEMIA, UNSPECIFIED WHETHER LONG TERM INSULIN USE (MULTI): ICD-10-CM

## 2023-10-04 DIAGNOSIS — N18.30 CKD STAGE 3 SECONDARY TO DIABETES (MULTI): ICD-10-CM

## 2023-10-04 DIAGNOSIS — I10 ESSENTIAL HYPERTENSION: ICD-10-CM

## 2023-10-04 DIAGNOSIS — I67.1 INTRACRANIAL ANEURYSM (HHS-HCC): ICD-10-CM

## 2023-10-04 DIAGNOSIS — R41.3 MEMORY CHANGE: ICD-10-CM

## 2023-10-04 PROCEDURE — 3079F DIAST BP 80-89 MM HG: CPT | Performed by: INTERNAL MEDICINE

## 2023-10-04 PROCEDURE — 1159F MED LIST DOCD IN RCRD: CPT | Performed by: INTERNAL MEDICINE

## 2023-10-04 PROCEDURE — 3075F SYST BP GE 130 - 139MM HG: CPT | Performed by: INTERNAL MEDICINE

## 2023-10-04 PROCEDURE — 1036F TOBACCO NON-USER: CPT | Performed by: INTERNAL MEDICINE

## 2023-10-04 PROCEDURE — 99214 OFFICE O/P EST MOD 30 MIN: CPT | Performed by: INTERNAL MEDICINE

## 2023-10-04 PROCEDURE — 96372 THER/PROPH/DIAG INJ SC/IM: CPT | Performed by: INTERNAL MEDICINE

## 2023-10-04 PROCEDURE — 1160F RVW MEDS BY RX/DR IN RCRD: CPT | Performed by: INTERNAL MEDICINE

## 2023-10-04 PROCEDURE — 1125F AMNT PAIN NOTED PAIN PRSNT: CPT | Performed by: INTERNAL MEDICINE

## 2023-10-04 RX ORDER — CYANOCOBALAMIN 1000 UG/ML
1000 INJECTION, SOLUTION INTRAMUSCULAR; SUBCUTANEOUS ONCE
Status: COMPLETED | OUTPATIENT
Start: 2023-10-04 | End: 2023-10-04

## 2023-10-04 RX ORDER — LORAZEPAM 0.5 MG/1
0.5 TABLET ORAL DAILY PRN
Qty: 30 TABLET | Refills: 0 | Status: SHIPPED | OUTPATIENT
Start: 2023-10-04 | End: 2024-05-03 | Stop reason: ALTCHOICE

## 2023-10-04 RX ORDER — BUPROPION HYDROCHLORIDE 300 MG/1
300 TABLET ORAL EVERY MORNING
Qty: 30 TABLET | Refills: 11 | Status: SHIPPED | OUTPATIENT
Start: 2023-10-04 | End: 2023-10-19

## 2023-10-04 RX ORDER — CARVEDILOL 12.5 MG/1
12.5 TABLET ORAL 2 TIMES DAILY
Qty: 180 TABLET | Refills: 3 | Status: SHIPPED | OUTPATIENT
Start: 2023-10-04 | End: 2024-10-03

## 2023-10-04 RX ADMIN — CYANOCOBALAMIN 1000 MCG: 1000 INJECTION, SOLUTION INTRAMUSCULAR; SUBCUTANEOUS at 15:54

## 2023-10-04 ASSESSMENT — ENCOUNTER SYMPTOMS
PALPITATIONS: 0
SHORTNESS OF BREATH: 0
HYPERTENSION: 1
DIARRHEA: 0
COUGH: 0
CONSTIPATION: 0
WHEEZING: 0
ABDOMINAL PAIN: 0
LIGHT-HEADEDNESS: 1

## 2023-10-04 NOTE — PROGRESS NOTES
"Subjective   Patient ID: Elma Hernandez is a 78 y.o. female who presents for Hypertension.    Hypertension  Pertinent negatives include no chest pain, palpitations or shortness of breath.   Overall feeling well.  We reviewed her current medications as well as her recent lab results.    She does admit to some off and on lightheaded episodes.  This has been a rather chronic issue for her.    She also admits to a burning tongue feeling.    She denies any issues with CP or SOB and denies any claudication symptoms.    She also admits to increased in depressive symptoms since a recent death of a loved one.      Review of Systems   HENT:           Mouth urning , with a funny taste in the month.   Respiratory:  Negative for cough, shortness of breath and wheezing.    Cardiovascular:  Negative for chest pain and palpitations.   Gastrointestinal:  Negative for abdominal pain, constipation and diarrhea.   Neurological:  Positive for light-headedness.       Objective   /80 (BP Location: Left arm, Patient Position: Sitting, BP Cuff Size: Adult)   Pulse 88   Temp 36.7 °C (98 °F) (Tympanic)   Resp 14   Ht 1.626 m (5' 4\")   Wt 76.2 kg (168 lb)   SpO2 98%   BMI 28.84 kg/m²     Physical Exam  Vitals reviewed.   Constitutional:       Appearance: Normal appearance.   HENT:      Head: Normocephalic.   Cardiovascular:      Rate and Rhythm: Normal rate and regular rhythm.   Pulmonary:      Effort: Pulmonary effort is normal.      Breath sounds: Normal breath sounds.   Musculoskeletal:         General: Normal range of motion.   Neurological:      General: No focal deficit present.      Mental Status: She is alert.   Psychiatric:         Mood and Affect: Mood normal.         Assessment/Plan   Problem List Items Addressed This Visit             ICD-10-CM    CKD stage 3 secondary to diabetes (CMS/HCC) E11.22, N18.30    Essential hypertension I10    Relevant Medications    carvedilol (Coreg) 12.5 mg tablet    Intracranial " aneurysm I67.1    Relevant Orders    MR angio head wo IV contrast    Depression F32.A    Relevant Medications    buPROPion XL (Wellbutrin XL) 300 mg 24 hr tablet    Mood disorder (CMS/HCC) F39    Relevant Medications    LORazepam (Ativan) 0.5 mg tablet    Peripheral vascular disease (CMS/HCC) - Primary I73.9    Type 2 diabetes mellitus with hyperglycemia (CMS/[facility]) E11.65     Other Visit Diagnoses         Codes    Memory change     R41.3    Balance disorder     R26.89    Relevant Orders    Referral to Physical Therapy    Low vitamin B12 level     R79.89    Relevant Medications    cyanocobalamin (Vitamin B-12) injection 1,000 mcg (Completed)        We reviewed and discussed all of the above.    Labs reviewed.  Changes in medications reviewed and discussed.  Stressed need for good hydration.   Follow up in 2 months - sooner if any issues.

## 2023-10-06 ENCOUNTER — OFFICE VISIT (OUTPATIENT)
Dept: PRIMARY CARE | Facility: CLINIC | Age: 78
End: 2023-10-06
Payer: MEDICARE

## 2023-10-06 ENCOUNTER — APPOINTMENT (OUTPATIENT)
Dept: PRIMARY CARE | Facility: CLINIC | Age: 78
End: 2023-10-06
Payer: MEDICARE

## 2023-10-06 VITALS
DIASTOLIC BLOOD PRESSURE: 82 MMHG | OXYGEN SATURATION: 98 % | WEIGHT: 168 LBS | TEMPERATURE: 97.7 F | HEART RATE: 76 BPM | SYSTOLIC BLOOD PRESSURE: 128 MMHG | RESPIRATION RATE: 18 BRPM | BODY MASS INDEX: 28.84 KG/M2

## 2023-10-06 DIAGNOSIS — H61.23 BILATERAL IMPACTED CERUMEN: Primary | ICD-10-CM

## 2023-10-06 PROCEDURE — 99213 OFFICE O/P EST LOW 20 MIN: CPT | Performed by: NURSE PRACTITIONER

## 2023-10-06 PROCEDURE — 3079F DIAST BP 80-89 MM HG: CPT | Performed by: NURSE PRACTITIONER

## 2023-10-06 PROCEDURE — 1036F TOBACCO NON-USER: CPT | Performed by: NURSE PRACTITIONER

## 2023-10-06 PROCEDURE — 69209 REMOVE IMPACTED EAR WAX UNI: CPT | Performed by: NURSE PRACTITIONER

## 2023-10-06 PROCEDURE — 1125F AMNT PAIN NOTED PAIN PRSNT: CPT | Performed by: NURSE PRACTITIONER

## 2023-10-06 PROCEDURE — 3074F SYST BP LT 130 MM HG: CPT | Performed by: NURSE PRACTITIONER

## 2023-10-06 PROCEDURE — 1160F RVW MEDS BY RX/DR IN RCRD: CPT | Performed by: NURSE PRACTITIONER

## 2023-10-06 PROCEDURE — 1159F MED LIST DOCD IN RCRD: CPT | Performed by: NURSE PRACTITIONER

## 2023-10-06 ASSESSMENT — ENCOUNTER SYMPTOMS
ACTIVITY CHANGE: 0
FEVER: 0
SLEEP DISTURBANCE: 0
FATIGUE: 0
APPETITE CHANGE: 0
DIZZINESS: 0
CONSTIPATION: 0
NAUSEA: 0
CHILLS: 0
DIARRHEA: 0
SORE THROAT: 0
HEADACHES: 0
COUGH: 0
VOMITING: 0

## 2023-10-06 NOTE — PROGRESS NOTES
Subjective   Patient ID: Elma Hernandez is a 78 y.o. female who presents for Ear Fullness.    DELMAR osorio  Saw PCP on Tuesday and was told she had wax in ear  Tried to clean out ear and thinks she made it worse  Denies any cold symptoms  No ear pain; just Hearing is muffled  Not hearing well in R ear    OTC- none        Ear Fullness   There is pain in the left ear. This is a new problem. The current episode started in the past 7 days (2 days). The problem occurs constantly. The problem has been unchanged. There has been no fever. The patient is experiencing no pain. Pertinent negatives include no coughing, diarrhea, ear discharge, headaches, sore throat or vomiting. She has tried ear drops for the symptoms. The treatment provided no relief.        Review of Systems   Constitutional:  Negative for activity change, appetite change, chills, fatigue and fever.   HENT:  Negative for congestion, ear discharge, ear pain and sore throat.    Respiratory:  Negative for cough.    Gastrointestinal:  Negative for constipation, diarrhea, nausea and vomiting.   Neurological:  Negative for dizziness and headaches.   Psychiatric/Behavioral:  Negative for sleep disturbance.        Objective   /82   Pulse 76   Temp 36.5 °C (97.7 °F) (Temporal)   Resp 18   Wt 76.2 kg (168 lb)   SpO2 98%   BMI 28.84 kg/m²     Physical Exam  Vitals reviewed.   Constitutional:       Appearance: Normal appearance.   HENT:      Head: Normocephalic.      Right Ear: Tympanic membrane, ear canal and external ear normal.      Left Ear: Tympanic membrane, ear canal and external ear normal.      Ears:      Comments: Wax noted bilaterally in ears; not full occluded       Nose: Nose normal.      Mouth/Throat:      Mouth: Mucous membranes are moist.   Eyes:      Extraocular Movements: Extraocular movements intact.      Conjunctiva/sclera: Conjunctivae normal.      Pupils: Pupils are equal, round, and reactive to light.   Cardiovascular:      Rate and Rhythm:  Normal rate and regular rhythm.      Pulses: Normal pulses.      Heart sounds: Normal heart sounds.   Pulmonary:      Effort: Pulmonary effort is normal.      Breath sounds: Normal breath sounds.   Musculoskeletal:      Cervical back: Normal range of motion and neck supple.   Skin:     General: Skin is warm and dry.      Capillary Refill: Capillary refill takes less than 2 seconds.   Neurological:      General: No focal deficit present.      Mental Status: She is alert and oriented to person, place, and time.   Psychiatric:         Mood and Affect: Mood normal.         Behavior: Behavior normal.     Patient ID: Elma Hernandez is a 78 y.o. female.    Ear Cerumen Removal    Date/Time: 10/6/2023 10:57 AM    Performed by: HARVEY Artis  Authorized by: HARVEY Artis    Consent:     Consent obtained:  Verbal    Consent given by:  Patient    Risks, benefits, and alternatives were discussed: yes      Risks discussed:  Pain, bleeding, dizziness and incomplete removal    Alternatives discussed:  No treatment  Universal protocol:     Procedure explained and questions answered to patient or proxy's satisfaction: yes      Relevant documents present and verified: yes      Test results available: no      Imaging studies available: no      Required blood products, implants, devices, and special equipment available: yes      Site/side marked: yes      Immediately prior to procedure, a time out was called: yes      Patient identity confirmed:  Verbally with patient  Procedure details:     Location:  L ear and R ear    Procedure type: irrigation      Procedure outcomes: cerumen removed    Post-procedure details:     Inspection:  Some cerumen remaining, no bleeding and TM intact    Hearing quality:  Improved    Procedure completion:  Tolerated      Assessment/Plan   Problem List Items Addressed This Visit             ICD-10-CM    Bilateral impacted cerumen - Primary H61.23     Bilateral ears irrigated; small  amount of wax remains in both ears  Pt states improvement  Can use OTC debrox as needed  Follow up with PCP with any additional concerns

## 2023-10-06 NOTE — ASSESSMENT & PLAN NOTE
Bilateral ears irrigated; small amount of wax remains in both ears  Pt states improvement  Can use OTC debrox as needed  Follow up with PCP with any additional concerns

## 2023-10-11 ENCOUNTER — APPOINTMENT (OUTPATIENT)
Dept: PRIMARY CARE | Facility: CLINIC | Age: 78
End: 2023-10-11
Payer: MEDICARE

## 2023-10-12 ENCOUNTER — TELEPHONE (OUTPATIENT)
Dept: PRIMARY CARE | Facility: CLINIC | Age: 78
End: 2023-10-12
Payer: MEDICARE

## 2023-10-12 NOTE — TELEPHONE ENCOUNTER
Patient lm  Dr MARSHALL previously prescribed an abx for UTI that she did not need to use.. but thinks she needs to use now.   Wondering if that is ok.   Please advise. Leaving town today at 2p

## 2023-10-16 ENCOUNTER — HOSPITAL ENCOUNTER (EMERGENCY)
Facility: HOSPITAL | Age: 78
Discharge: HOME | End: 2023-10-17
Attending: STUDENT IN AN ORGANIZED HEALTH CARE EDUCATION/TRAINING PROGRAM
Payer: MEDICARE

## 2023-10-16 ENCOUNTER — TELEPHONE (OUTPATIENT)
Dept: PRIMARY CARE | Facility: CLINIC | Age: 78
End: 2023-10-16
Payer: MEDICARE

## 2023-10-16 DIAGNOSIS — N17.9 AKI (ACUTE KIDNEY INJURY) (CMS-HCC): ICD-10-CM

## 2023-10-16 DIAGNOSIS — R00.2 PALPITATIONS: Primary | ICD-10-CM

## 2023-10-16 DIAGNOSIS — I10 HYPERTENSION, UNSPECIFIED TYPE: ICD-10-CM

## 2023-10-16 LAB
ALBUMIN SERPL BCP-MCNC: 4.2 G/DL (ref 3.4–5)
ALP SERPL-CCNC: 34 U/L (ref 33–136)
ALT SERPL W P-5'-P-CCNC: 10 U/L (ref 7–45)
ANION GAP SERPL CALC-SCNC: 13 MMOL/L (ref 10–20)
AST SERPL W P-5'-P-CCNC: 12 U/L (ref 9–39)
BASOPHILS # BLD AUTO: 0.04 X10*3/UL (ref 0–0.1)
BASOPHILS NFR BLD AUTO: 0.5 %
BILIRUB SERPL-MCNC: 0.4 MG/DL (ref 0–1.2)
BUN SERPL-MCNC: 49 MG/DL (ref 6–23)
CALCIUM SERPL-MCNC: 9.4 MG/DL (ref 8.6–10.3)
CARDIAC TROPONIN I PNL SERPL HS: 5 NG/L (ref 0–13)
CARDIAC TROPONIN I PNL SERPL HS: 5 NG/L (ref 0–13)
CHLORIDE SERPL-SCNC: 104 MMOL/L (ref 98–107)
CO2 SERPL-SCNC: 22 MMOL/L (ref 21–32)
CREAT SERPL-MCNC: 2.48 MG/DL (ref 0.5–1.05)
EOSINOPHIL # BLD AUTO: 0.16 X10*3/UL (ref 0–0.4)
EOSINOPHIL NFR BLD AUTO: 2.2 %
ERYTHROCYTE [DISTWIDTH] IN BLOOD BY AUTOMATED COUNT: 13.7 % (ref 11.5–14.5)
GFR SERPL CREATININE-BSD FRML MDRD: 20 ML/MIN/1.73M*2
GLUCOSE SERPL-MCNC: 109 MG/DL (ref 74–99)
HCT VFR BLD AUTO: 34.7 % (ref 36–46)
HGB BLD-MCNC: 11.7 G/DL (ref 12–16)
HOLD SPECIMEN: NORMAL
IMM GRANULOCYTES # BLD AUTO: 0.04 X10*3/UL (ref 0–0.5)
IMM GRANULOCYTES NFR BLD AUTO: 0.5 % (ref 0–0.9)
LYMPHOCYTES # BLD AUTO: 1.82 X10*3/UL (ref 0.8–3)
LYMPHOCYTES NFR BLD AUTO: 25 %
MAGNESIUM SERPL-MCNC: 1.74 MG/DL (ref 1.6–2.4)
MCH RBC QN AUTO: 28 PG (ref 26–34)
MCHC RBC AUTO-ENTMCNC: 33.7 G/DL (ref 32–36)
MCV RBC AUTO: 83 FL (ref 80–100)
MONOCYTES # BLD AUTO: 0.51 X10*3/UL (ref 0.05–0.8)
MONOCYTES NFR BLD AUTO: 7 %
NEUTROPHILS # BLD AUTO: 4.71 X10*3/UL (ref 1.6–5.5)
NEUTROPHILS NFR BLD AUTO: 64.8 %
NRBC BLD-RTO: 0 /100 WBCS (ref 0–0)
PLATELET # BLD AUTO: 218 X10*3/UL (ref 150–450)
PMV BLD AUTO: 10 FL (ref 7.5–11.5)
POTASSIUM SERPL-SCNC: 4 MMOL/L (ref 3.5–5.3)
PROT SERPL-MCNC: 6.8 G/DL (ref 6.4–8.2)
RBC # BLD AUTO: 4.18 X10*6/UL (ref 4–5.2)
SODIUM SERPL-SCNC: 135 MMOL/L (ref 136–145)
TSH SERPL-ACNC: 2.31 MIU/L (ref 0.44–3.98)
WBC # BLD AUTO: 7.3 X10*3/UL (ref 4.4–11.3)

## 2023-10-16 PROCEDURE — 84484 ASSAY OF TROPONIN QUANT: CPT | Mod: 59 | Performed by: STUDENT IN AN ORGANIZED HEALTH CARE EDUCATION/TRAINING PROGRAM

## 2023-10-16 PROCEDURE — 99285 EMERGENCY DEPT VISIT HI MDM: CPT | Performed by: STUDENT IN AN ORGANIZED HEALTH CARE EDUCATION/TRAINING PROGRAM

## 2023-10-16 PROCEDURE — 84484 ASSAY OF TROPONIN QUANT: CPT | Performed by: STUDENT IN AN ORGANIZED HEALTH CARE EDUCATION/TRAINING PROGRAM

## 2023-10-16 PROCEDURE — 2500000004 HC RX 250 GENERAL PHARMACY W/ HCPCS (ALT 636 FOR OP/ED): Performed by: STUDENT IN AN ORGANIZED HEALTH CARE EDUCATION/TRAINING PROGRAM

## 2023-10-16 PROCEDURE — 85025 COMPLETE CBC W/AUTO DIFF WBC: CPT | Performed by: STUDENT IN AN ORGANIZED HEALTH CARE EDUCATION/TRAINING PROGRAM

## 2023-10-16 PROCEDURE — 2580000001 HC RX 258 IV SOLUTIONS: Performed by: STUDENT IN AN ORGANIZED HEALTH CARE EDUCATION/TRAINING PROGRAM

## 2023-10-16 PROCEDURE — 96361 HYDRATE IV INFUSION ADD-ON: CPT | Performed by: STUDENT IN AN ORGANIZED HEALTH CARE EDUCATION/TRAINING PROGRAM

## 2023-10-16 PROCEDURE — 83735 ASSAY OF MAGNESIUM: CPT | Performed by: STUDENT IN AN ORGANIZED HEALTH CARE EDUCATION/TRAINING PROGRAM

## 2023-10-16 PROCEDURE — 99284 EMERGENCY DEPT VISIT MOD MDM: CPT | Mod: 25 | Performed by: STUDENT IN AN ORGANIZED HEALTH CARE EDUCATION/TRAINING PROGRAM

## 2023-10-16 PROCEDURE — 93005 ELECTROCARDIOGRAM TRACING: CPT

## 2023-10-16 PROCEDURE — 36415 COLL VENOUS BLD VENIPUNCTURE: CPT | Performed by: STUDENT IN AN ORGANIZED HEALTH CARE EDUCATION/TRAINING PROGRAM

## 2023-10-16 PROCEDURE — 96374 THER/PROPH/DIAG INJ IV PUSH: CPT | Performed by: STUDENT IN AN ORGANIZED HEALTH CARE EDUCATION/TRAINING PROGRAM

## 2023-10-16 PROCEDURE — 84443 ASSAY THYROID STIM HORMONE: CPT | Performed by: STUDENT IN AN ORGANIZED HEALTH CARE EDUCATION/TRAINING PROGRAM

## 2023-10-16 PROCEDURE — 80053 COMPREHEN METABOLIC PANEL: CPT | Performed by: STUDENT IN AN ORGANIZED HEALTH CARE EDUCATION/TRAINING PROGRAM

## 2023-10-16 RX ORDER — LABETALOL HYDROCHLORIDE 5 MG/ML
10 INJECTION, SOLUTION INTRAVENOUS ONCE
Status: COMPLETED | OUTPATIENT
Start: 2023-10-16 | End: 2023-10-16

## 2023-10-16 RX ADMIN — LABETALOL HYDROCHLORIDE 10 MG: 5 INJECTION, SOLUTION INTRAVENOUS at 23:34

## 2023-10-16 RX ADMIN — SODIUM CHLORIDE, POTASSIUM CHLORIDE, SODIUM LACTATE AND CALCIUM CHLORIDE 1000 ML: 600; 310; 30; 20 INJECTION, SOLUTION INTRAVENOUS at 22:37

## 2023-10-16 ASSESSMENT — PAIN SCALES - GENERAL
PAINLEVEL_OUTOF10: 0 - NO PAIN

## 2023-10-16 ASSESSMENT — COLUMBIA-SUICIDE SEVERITY RATING SCALE - C-SSRS
1. IN THE PAST MONTH, HAVE YOU WISHED YOU WERE DEAD OR WISHED YOU COULD GO TO SLEEP AND NOT WAKE UP?: NO
2. HAVE YOU ACTUALLY HAD ANY THOUGHTS OF KILLING YOURSELF?: NO
6. HAVE YOU EVER DONE ANYTHING, STARTED TO DO ANYTHING, OR PREPARED TO DO ANYTHING TO END YOUR LIFE?: NO

## 2023-10-16 ASSESSMENT — LIFESTYLE VARIABLES
HAVE PEOPLE ANNOYED YOU BY CRITICIZING YOUR DRINKING: NO
EVER HAD A DRINK FIRST THING IN THE MORNING TO STEADY YOUR NERVES TO GET RID OF A HANGOVER: NO
HAVE YOU EVER FELT YOU SHOULD CUT DOWN ON YOUR DRINKING: NO
REASON UNABLE TO ASSESS: NO
EVER FELT BAD OR GUILTY ABOUT YOUR DRINKING: NO

## 2023-10-16 ASSESSMENT — PAIN DESCRIPTION - PROGRESSION: CLINICAL_PROGRESSION: NOT CHANGED

## 2023-10-16 ASSESSMENT — PAIN - FUNCTIONAL ASSESSMENT: PAIN_FUNCTIONAL_ASSESSMENT: 0-10

## 2023-10-17 ENCOUNTER — TELEPHONE (OUTPATIENT)
Dept: PRIMARY CARE | Facility: CLINIC | Age: 78
End: 2023-10-17
Payer: MEDICARE

## 2023-10-17 ENCOUNTER — TELEPHONE (OUTPATIENT)
Dept: CARDIOLOGY | Facility: CLINIC | Age: 78
End: 2023-10-17
Payer: MEDICARE

## 2023-10-17 VITALS
DIASTOLIC BLOOD PRESSURE: 89 MMHG | WEIGHT: 167.99 LBS | HEIGHT: 66 IN | HEART RATE: 71 BPM | RESPIRATION RATE: 18 BRPM | OXYGEN SATURATION: 99 % | SYSTOLIC BLOOD PRESSURE: 191 MMHG | BODY MASS INDEX: 27 KG/M2 | TEMPERATURE: 97.9 F

## 2023-10-17 NOTE — TELEPHONE ENCOUNTER
Went to ED last night.  Sent home.  BP still high.  160/104 today.  Was told to take BP med today and then again in 3 hours.  Does she need to follow up?  Requested call back.

## 2023-10-17 NOTE — ED PROVIDER NOTES
HPI   Chief Complaint   Patient presents with    Rapid Heart Rate     [place] heart racing at home last night.  Called doctor this am who advised her to come in and be seen       78-year-old female with a past medical history of hypertension, diabetes and CKD presenting to the ED for palpitations.  Patient states this occurred last night while she was laying down in bed.  She had several episodes that lasted a few seconds each.  She did not experience any chest pain, shortness of breath, weakness, dizziness, diaphoresis or nausea.  She called her PCPs office today and the nurse directed her to the ED to be evaluated.  Patient denies any recent fevers, cough, vomiting, diarrhea, bloody stools or leg swelling.  Patient states she currently is on carvedilol and was told by her PCP to discontinue her other antihypertensives.  She has been experiencing episodes of dizziness for the past 1 year and is currently in the process of being evaluated by her PCP with MRI and carotid ultrasound.      History provided by:  Patient                      Nadine Coma Scale Score: 15                  Patient History   Past Medical History:   Diagnosis Date    Contusion of unspecified part of head, initial encounter     Head contusion    Other chest pain 03/05/2018    Chest heaviness    Personal history of diseases of the skin and subcutaneous tissue 08/16/2019    History of folliculitis    Personal history of malignant neoplasm of other parts of uterus     History of malignant neoplasm of endometrium    Personal history of other diseases of the respiratory system 08/21/2020    History of sinusitis    Personal history of other diseases of the respiratory system 09/13/2020    History of acute sinusitis    Personal history of other endocrine, nutritional and metabolic disease     History of diabetes mellitus    Type 2 diabetes mellitus with other diabetic neurological complication (CMS/[facility]) 11/30/2020    Diabetes mellitus with neurological  manifestations    Type 2 diabetes mellitus with other skin complications (CMS/HCC) 03/18/2022    Diabetic foot infection    Type 2 diabetes mellitus with other skin ulcer (CODE) (CMS/[facility])     Diabetic pressure ulcer, stage 1     Past Surgical History:   Procedure Laterality Date    CHOLECYSTECTOMY  11/11/2014    Cholecystectomy    HYSTERECTOMY  11/11/2014    Hysterectomy    MR HEAD ANGIO WO IV CONTRAST  1/29/2021    MR HEAD ANGIO WO IV CONTRAST 1/29/2021 [facility]    OTHER SURGICAL HISTORY  06/11/2019    Breast biopsy    OTHER SURGICAL HISTORY  06/11/2019    Thyroidectomy    OTHER SURGICAL HISTORY  06/11/2019    Hip surgery    TONSILLECTOMY  11/11/2014    Tonsillectomy     Family History   Problem Relation Name Age of Onset    Other (Cardiac disorder) Mother      Diabetes Mother      Hypertension Mother      Lung disease Mother      Kidney cancer Mother      Diabetes Father      Other (Cardiac disorder) Father       Social History     Tobacco Use    Smoking status: Never    Smokeless tobacco: Never   Vaping Use    Vaping Use: Never used   Substance Use Topics    Alcohol use: Not Currently     Alcohol/week: 1.0 standard drink of alcohol     Types: 1 Standard drinks or equivalent per week    Drug use: Never       Physical Exam   ED Triage Vitals [10/16/23 1929]   Temp Heart Rate Resp BP   36.6 °C (97.9 °F) 74 18 144/65      SpO2 Temp Source Heart Rate Source Patient Position   97 % Temporal Monitor Sitting      BP Location FiO2 (%)     Left arm --       Physical Exam  Vitals and nursing note reviewed.   Constitutional:       General: She is not in acute distress.     Appearance: Normal appearance. She is not toxic-appearing.   HENT:      Head: Normocephalic and atraumatic.      Nose: Nose normal.      Mouth/Throat:      Mouth: Mucous membranes are moist.   Eyes:      Extraocular Movements: Extraocular movements intact.      Conjunctiva/sclera: Conjunctivae normal.      Pupils: Pupils are equal, round, and  reactive to light.   Cardiovascular:      Rate and Rhythm: Normal rate and regular rhythm.      Pulses: Normal pulses.      Heart sounds: Normal heart sounds.   Pulmonary:      Effort: Pulmonary effort is normal.      Breath sounds: Normal breath sounds.   Abdominal:      General: There is no distension.      Palpations: Abdomen is soft.      Tenderness: There is no abdominal tenderness.   Musculoskeletal:         General: Normal range of motion.      Cervical back: Normal range of motion and neck supple.      Right lower leg: No edema.      Left lower leg: No edema.   Skin:     General: Skin is warm and dry.      Capillary Refill: Capillary refill takes less than 2 seconds.   Neurological:      General: No focal deficit present.      Mental Status: She is alert. Mental status is at baseline.      Cranial Nerves: No cranial nerve deficit.      Sensory: No sensory deficit.      Motor: No weakness.         ED Course & MDM   Diagnoses as of 10/17/23 0106   Palpitations   STEPHANIE (acute kidney injury) (CMS/[facility])   Hypertension, unspecified type       Medical Decision Making  78-year-old female with a past medical history of hypertension, diabetes and CKD presenting to the ED for palpitations that occurred last night.  She has not had additional palpitations today and does not endorse chest pain, shortness of breath or lightheadedness.  She is well-appearing and neurologically intact on exam.  She is found to be hypertensive with /91 and this did uptrend to 233/95.    Labs were significant for STEPHANIE on CKD.  She has normal potassium and normal cardiac enzymes.  Normal thyroid function.    Patient was treated with IV fluids for her STEPHANIE and on reevaluation she was feeling better.  She was given labetalol for blood pressure and this did start to downtrend.  She will continue her carvedilol at home and was advised to keep a log of her blood pressures.  She will follow-up with her primary care physician in 1 week for blood  pressure management and repeat labs for her STEPHANIE.  She is comfortable with this plan.  She was also given a referral for cardiology as her cardiologist is no longer practicing in the area.  She was advised to return to the ED for worsening symptoms.  Patient and her  are comfortable with this plan.        Procedure  Procedures     Jovanni Fowler DO  Resident  10/17/23 0106

## 2023-10-17 NOTE — TELEPHONE ENCOUNTER
PT wants to be seen next week. She saw Dr. Shore in the past. Was in the ED w/'s/100's & Tachycardia. Set up w/T. Schwab in a week, until she can come see you as a NPV in November.

## 2023-10-17 NOTE — ED NOTES
Assumed pt care. VS obtained with labetalol given for /95. Pt is asymptomatic. Warm blanket given.  at bedside.     Dinah Singh RN  10/16/23 1035     normal...

## 2023-10-17 NOTE — DISCHARGE INSTRUCTIONS
Keep a log of your blood pressures daily to 2 hours after taking your blood pressure medications.  Follow-up with your primary care physician within 1 week to reassess your blood pressure and your labs due to your worsening kidney labs today.  Return to the ED for any worsening symptoms.

## 2023-10-17 NOTE — TELEPHONE ENCOUNTER
Patient requesting a call back wants to speak with you about ER trip yesterday, said she wanted to wait to make appt till she speaks with you please call back

## 2023-10-18 ENCOUNTER — TELEPHONE (OUTPATIENT)
Dept: PHARMACY | Facility: HOSPITAL | Age: 78
End: 2023-10-18

## 2023-10-18 ENCOUNTER — APPOINTMENT (OUTPATIENT)
Dept: PRIMARY CARE | Facility: CLINIC | Age: 78
End: 2023-10-18
Payer: MEDICARE

## 2023-10-18 NOTE — TELEPHONE ENCOUNTER
Population Health: Outreach by Ambulatory Pharmacy Team    Payor: [employer] [place]  Reason: Adherence  Medication: BENAZEPRIL TAB 40MG  Outcome: Patient Reached: Barriers Identified, med dc by PCP    BETTY MADISON CPhT

## 2023-10-19 ENCOUNTER — OFFICE VISIT (OUTPATIENT)
Dept: PRIMARY CARE | Facility: CLINIC | Age: 78
End: 2023-10-19
Payer: MEDICARE

## 2023-10-19 VITALS
HEIGHT: 66 IN | TEMPERATURE: 98 F | HEART RATE: 70 BPM | SYSTOLIC BLOOD PRESSURE: 134 MMHG | DIASTOLIC BLOOD PRESSURE: 60 MMHG | OXYGEN SATURATION: 98 % | RESPIRATION RATE: 14 BRPM | WEIGHT: 167 LBS | BODY MASS INDEX: 26.84 KG/M2

## 2023-10-19 DIAGNOSIS — F41.9 ANXIETY: ICD-10-CM

## 2023-10-19 DIAGNOSIS — E11.22 CKD STAGE 3 SECONDARY TO DIABETES (MULTI): Primary | ICD-10-CM

## 2023-10-19 DIAGNOSIS — N18.30 CKD STAGE 3 SECONDARY TO DIABETES (MULTI): Primary | ICD-10-CM

## 2023-10-19 DIAGNOSIS — I73.9 PERIPHERAL VASCULAR DISEASE (CMS-HCC): ICD-10-CM

## 2023-10-19 DIAGNOSIS — N39.0 URINARY TRACT INFECTION WITHOUT HEMATURIA, SITE UNSPECIFIED: ICD-10-CM

## 2023-10-19 DIAGNOSIS — I10 ESSENTIAL HYPERTENSION: ICD-10-CM

## 2023-10-19 LAB
POC APPEARANCE, URINE: CLEAR
POC BILIRUBIN, URINE: NEGATIVE
POC BLOOD, URINE: NEGATIVE
POC COLOR, URINE: YELLOW
POC GLUCOSE, URINE: NEGATIVE MG/DL
POC KETONES, URINE: NEGATIVE MG/DL
POC LEUKOCYTES, URINE: NEGATIVE
POC NITRITE,URINE: NEGATIVE
POC PH, URINE: 7 PH
POC PROTEIN, URINE: NEGATIVE MG/DL
POC SPECIFIC GRAVITY, URINE: 1.01
POC UROBILINOGEN, URINE: 1 EU/DL

## 2023-10-19 PROCEDURE — G0008 ADMIN INFLUENZA VIRUS VAC: HCPCS | Performed by: INTERNAL MEDICINE

## 2023-10-19 PROCEDURE — 90662 IIV NO PRSV INCREASED AG IM: CPT | Performed by: INTERNAL MEDICINE

## 2023-10-19 PROCEDURE — 1159F MED LIST DOCD IN RCRD: CPT | Performed by: INTERNAL MEDICINE

## 2023-10-19 PROCEDURE — 99214 OFFICE O/P EST MOD 30 MIN: CPT | Performed by: INTERNAL MEDICINE

## 2023-10-19 PROCEDURE — 1036F TOBACCO NON-USER: CPT | Performed by: INTERNAL MEDICINE

## 2023-10-19 PROCEDURE — 1125F AMNT PAIN NOTED PAIN PRSNT: CPT | Performed by: INTERNAL MEDICINE

## 2023-10-19 PROCEDURE — 81002 URINALYSIS NONAUTO W/O SCOPE: CPT | Performed by: INTERNAL MEDICINE

## 2023-10-19 PROCEDURE — 1160F RVW MEDS BY RX/DR IN RCRD: CPT | Performed by: INTERNAL MEDICINE

## 2023-10-19 PROCEDURE — 3075F SYST BP GE 130 - 139MM HG: CPT | Performed by: INTERNAL MEDICINE

## 2023-10-19 PROCEDURE — 3078F DIAST BP <80 MM HG: CPT | Performed by: INTERNAL MEDICINE

## 2023-10-19 RX ORDER — BUPROPION HYDROCHLORIDE 150 MG/1
150 TABLET ORAL EVERY MORNING
Qty: 30 TABLET | Refills: 1 | Status: SHIPPED | OUTPATIENT
Start: 2023-10-19 | End: 2023-11-09 | Stop reason: ALTCHOICE

## 2023-10-19 RX ORDER — SERTRALINE HYDROCHLORIDE 100 MG/1
100 TABLET, FILM COATED ORAL DAILY
Qty: 30 TABLET | Refills: 5 | Status: SHIPPED | OUTPATIENT
Start: 2023-10-19 | End: 2023-12-04 | Stop reason: SDUPTHER

## 2023-10-19 ASSESSMENT — ENCOUNTER SYMPTOMS
DIARRHEA: 0
WHEEZING: 0
SHORTNESS OF BREATH: 0
CONSTIPATION: 0
PALPITATIONS: 0
COUGH: 0

## 2023-10-19 NOTE — PROGRESS NOTES
"Subjective   Patient ID: Elma Hernandez is a 78 y.o. female who presents for Follow-up (ER / [facility]. ).    Went to ER for elevated BP.  She was asymptomatic.  Her BP at home has been fluctuating from 120 to 170.  No symptoms.  She does still get dizzy but this has not been related to her BP.    She admits to a lot of stress.  Medications have not been helping.    We reviewed and discussed her ER results.    She also states she has been urinating frequently and having some dysuria.      Review of Systems   Respiratory:  Negative for cough, shortness of breath and wheezing.    Cardiovascular:  Negative for chest pain and palpitations.   Gastrointestinal:  Negative for constipation and diarrhea.     Objective   /60 (BP Location: Left arm, Patient Position: Sitting, BP Cuff Size: Adult)   Pulse 70   Temp 36.7 °C (98 °F) (Tympanic)   Resp 14   Ht 1.676 m (5' 6\")   Wt 75.8 kg (167 lb)   SpO2 98%   BMI 26.95 kg/m²     Physical Exam  Vitals reviewed.   Constitutional:       Appearance: Normal appearance.   HENT:      Head: Normocephalic.   Cardiovascular:      Rate and Rhythm: Normal rate.   Pulmonary:      Effort: Pulmonary effort is normal.   Musculoskeletal:         General: Normal range of motion.   Neurological:      General: No focal deficit present.      Mental Status: She is alert.   Psychiatric:         Mood and Affect: Mood normal.       Assessment/Plan   Problem List Items Addressed This Visit             ICD-10-CM    Anxiety F41.9    Relevant Medications    buPROPion XL (Wellbutrin XL) 150 mg 24 hr tablet    sertraline (Zoloft) 100 mg tablet    CKD stage 3 secondary to diabetes (CMS/[facility]) - Primary E11.22, N18.30    Essential hypertension I10    Peripheral vascular disease (CMS/[facility]) I73.9     Other Visit Diagnoses         Codes    Urinary tract infection without hematuria, site unspecified     N39.0    Relevant Orders    POCT UA (nonautomated) manually resulted (Completed)    Urinalysis with Reflex " Microscopic    Urine Culture        UA was clean.  We will have go to lab for recheck if symptoms persist.    We discussed her anxiety possibly worsening her HTN.  We discussed decreasing her Wellbutrin to 150 and starting Zoloft 50 for 2 weeks then stopping Wellbutrin and increasing zoloft to 100 mg.   She has a renal artery duplex scan pending for tomorrow.  We will have her check her BP daily and if SBP remains 150 or higher she is to take 5 mg Amlodipine (she states she has this at home).  Follow up in 6-8 weeks.

## 2023-10-20 ENCOUNTER — LAB (OUTPATIENT)
Dept: LAB | Facility: LAB | Age: 78
End: 2023-10-20
Payer: MEDICARE

## 2023-10-20 DIAGNOSIS — N39.0 URINARY TRACT INFECTION WITHOUT HEMATURIA, SITE UNSPECIFIED: ICD-10-CM

## 2023-10-20 LAB
APPEARANCE UR: CLEAR
BILIRUB UR STRIP.AUTO-MCNC: NEGATIVE MG/DL
COLOR UR: NORMAL
GLUCOSE UR STRIP.AUTO-MCNC: NEGATIVE MG/DL
KETONES UR STRIP.AUTO-MCNC: NEGATIVE MG/DL
LEUKOCYTE ESTERASE UR QL STRIP.AUTO: NEGATIVE
NITRITE UR QL STRIP.AUTO: NEGATIVE
PH UR STRIP.AUTO: 7 [PH]
PROT UR STRIP.AUTO-MCNC: NEGATIVE MG/DL
RBC # UR STRIP.AUTO: NEGATIVE /UL
SP GR UR STRIP.AUTO: 1.01
UROBILINOGEN UR STRIP.AUTO-MCNC: <2 MG/DL

## 2023-10-20 PROCEDURE — 87086 URINE CULTURE/COLONY COUNT: CPT

## 2023-10-20 PROCEDURE — 81003 URINALYSIS AUTO W/O SCOPE: CPT

## 2023-10-21 ENCOUNTER — HOSPITAL ENCOUNTER (OUTPATIENT)
Dept: CARDIOLOGY | Facility: HOSPITAL | Age: 78
Discharge: HOME | End: 2023-10-21
Payer: MEDICARE

## 2023-10-21 LAB
ATRIAL RATE: 73 BPM
BACTERIA UR CULT: NORMAL
P AXIS: 88 DEGREES
P OFFSET: 196 MS
P ONSET: 138 MS
PR INTERVAL: 178 MS
Q ONSET: 227 MS
QRS COUNT: 12 BEATS
QRS DURATION: 84 MS
QT INTERVAL: 392 MS
QTC CALCULATION(BAZETT): 431 MS
QTC FREDERICIA: 418 MS
R AXIS: -34 DEGREES
T AXIS: 34 DEGREES
T OFFSET: 423 MS
VENTRICULAR RATE: 73 BPM

## 2023-10-23 ENCOUNTER — TELEPHONE (OUTPATIENT)
Dept: CARDIOLOGY | Facility: HOSPITAL | Age: 78
End: 2023-10-23

## 2023-10-23 ENCOUNTER — OFFICE VISIT (OUTPATIENT)
Dept: CARDIOLOGY | Facility: CLINIC | Age: 78
End: 2023-10-23
Payer: MEDICARE

## 2023-10-23 ENCOUNTER — TELEPHONE (OUTPATIENT)
Dept: NEPHROLOGY | Facility: HOSPITAL | Age: 78
End: 2023-10-23

## 2023-10-23 VITALS
WEIGHT: 168 LBS | HEART RATE: 82 BPM | SYSTOLIC BLOOD PRESSURE: 122 MMHG | OXYGEN SATURATION: 97 % | HEIGHT: 64 IN | DIASTOLIC BLOOD PRESSURE: 52 MMHG | BODY MASS INDEX: 28.68 KG/M2

## 2023-10-23 DIAGNOSIS — E11.22 CKD STAGE 3 SECONDARY TO DIABETES (MULTI): ICD-10-CM

## 2023-10-23 DIAGNOSIS — N18.30 CKD STAGE 3 SECONDARY TO DIABETES (MULTI): ICD-10-CM

## 2023-10-23 DIAGNOSIS — R00.2 PALPITATIONS: ICD-10-CM

## 2023-10-23 DIAGNOSIS — N18.4 CHRONIC KIDNEY DISEASE, STAGE 4 (SEVERE) (MULTI): Primary | ICD-10-CM

## 2023-10-23 DIAGNOSIS — E78.5 HYPERLIPIDEMIA, UNSPECIFIED HYPERLIPIDEMIA TYPE: ICD-10-CM

## 2023-10-23 DIAGNOSIS — I70.1 RENAL ARTERY STENOSIS, NATIVE, BILATERAL (CMS-HCC): ICD-10-CM

## 2023-10-23 DIAGNOSIS — I65.23 BILATERAL CAROTID ARTERY STENOSIS: ICD-10-CM

## 2023-10-23 DIAGNOSIS — I10 ESSENTIAL HYPERTENSION: Primary | ICD-10-CM

## 2023-10-23 PROCEDURE — 1159F MED LIST DOCD IN RCRD: CPT | Performed by: INTERNAL MEDICINE

## 2023-10-23 PROCEDURE — 1036F TOBACCO NON-USER: CPT | Performed by: INTERNAL MEDICINE

## 2023-10-23 PROCEDURE — 3074F SYST BP LT 130 MM HG: CPT | Performed by: INTERNAL MEDICINE

## 2023-10-23 PROCEDURE — 1125F AMNT PAIN NOTED PAIN PRSNT: CPT | Performed by: INTERNAL MEDICINE

## 2023-10-23 PROCEDURE — 3078F DIAST BP <80 MM HG: CPT | Performed by: INTERNAL MEDICINE

## 2023-10-23 PROCEDURE — 1160F RVW MEDS BY RX/DR IN RCRD: CPT | Performed by: INTERNAL MEDICINE

## 2023-10-23 PROCEDURE — 99205 OFFICE O/P NEW HI 60 MIN: CPT | Performed by: INTERNAL MEDICINE

## 2023-10-23 RX ORDER — ATORVASTATIN CALCIUM 40 MG/1
40 TABLET, FILM COATED ORAL DAILY
Qty: 90 TABLET | Refills: 3 | Status: SHIPPED | OUTPATIENT
Start: 2023-10-23 | End: 2024-10-22

## 2023-10-23 RX ORDER — AMLODIPINE BESYLATE 5 MG/1
5 TABLET ORAL DAILY
Qty: 30 TABLET | Refills: 11
Start: 2023-10-23 | End: 2024-01-31 | Stop reason: SDUPTHER

## 2023-10-23 ASSESSMENT — ENCOUNTER SYMPTOMS
LOSS OF SENSATION IN FEET: 1
DEPRESSION: 1
OCCASIONAL FEELINGS OF UNSTEADINESS: 1

## 2023-10-23 NOTE — PROGRESS NOTES
Name : Elma Hernandez    : 1945   MRN : [id number]   ENC Date : 10/23/23     Reason for visit: Emergency room follow-up for tachycardia    Assessment and Plan:  Tachycardia: Unclear etiology.  Multiple possibilities.  Her symptoms are fairly rare.  I do not think a Holter monitor would be of much use.  I recommended the Zoomya device or an Apple Watch to try to document what her rhythm is when she feels the palpitations.  Carotid artery disease: I recommended adding aspirin 81 mg daily to her medical regimen.  I doubt her carotid artery disease is causing her dizziness.  Dizziness: I suspect this is due to labile hypertension and orthostatic symptoms.  Some of her dizziness however is not positional and there probably is another component to it.  Hypertension: Continue carvedilol.  I recommended she simply take the amlodipine on a daily basis and only hold it if her blood pressure is lower than 100.  I think taking it on a regular basis will hopefully take away some of the labile nature to her blood pressure.  Renal artery stenosis: Renal artery stenting in this situation is currently fairly controversial.  Will discuss with her nephrologist.  Given her worsening renal insufficiency and labile hypertension might be worth consideration of intervention however.  Dyslipidemia: Given the significant carotid and renal artery atherosclerosis I would recommend increasing her atorvastatin to at least 40 mg/day.  She was agreeable with that.  I sent in the prescription for the 40 mg tablet.  Disp: Patient will follow-up with nephrology and vascular surgery.  She will see me back on an as-needed basis if her symptoms recur.      HPI:  Patient is seen for emergency room follow-up for palpitations.  She felt her heart skipping but also racing.  The symptoms lasted for several minutes but by the time she went to the emergency room her symptoms had resolved.  Interestingly her blood pressure was extremely elevated in the  emergency room but no intervention was done.  Since her emergency room stay she has not had any recurrence of the palpitations.  No TIA or CVA-like symptoms.  She does have some persistent dizziness that is been going on for quite a long time.  Some of her dizziness is associated with changes in position but oftentimes she can feel dizzy simply when seated.  There is no obvious change of head position that is caused at either.  She does not necessarily feel dizzy when her heart was beating rapidly and she certainly does not have rapid heartbeats when she feels dizzy.  She has not had any syncopal events.  No other TIA or CVA-like symptoms.  Her medications have been cut back from what was documented earlier this year.  It does not appear she is on Farxiga or benazepril any longer.  It sounds as if she was taking amlodipine 5 mg on an as-needed basis when her blood pressure was elevated.      Problem List:   Patient Active Problem List   Diagnosis    Allergic rhinitis    Anemia, deficiency    Anxiety    Atherosclerosis of native artery of right lower extremity with ulceration of heel (CMS/HCC)    BBB (bundle branch block)    Bilateral impacted cerumen    Charcot's joint of left foot    CKD stage 3 secondary to diabetes (CMS/HCC)    Diabetic neuropathy (CMS/HCC)    Diverticulosis of large intestine without hemorrhage    Vertigo    Essential hypertension    Familial hypertriglyceridemia    Gastroesophageal reflux disease    Gout    Hyperlipidemia, mixed    Hypomagnesemia    Intracranial aneurysm    Irritable bowel syndrome (IBS)    Depression    Mood disorder (CMS/HCC)    Osteoarthritis    Osteoporosis    Peripheral vascular disease (CMS/HCC)    RAD (reactive airway disease)    Poor balance    Rosacea    Thyroid disease    Type 2 diabetes mellitus with hyperglycemia (CMS/HCC)    Type 2 diabetes mellitus with hypertriglyceridemia (CMS/HCC)    Vasomotor rhinitis    Vitamin D deficiency    Weakness of both lower  extremities    Avascular necrosis of bone (CMS/HCC)        Meds:   Current Outpatient Medications on File Prior to Visit   Medication Sig Dispense Refill    albuterol (ProAir RespiClick) 90 mcg/actuation aerosol powdr breath activated inhaler INHALE 2 PUFFS Every 4 hours PRN 0.65 g 11    atorvastatin (Lipitor) 10 mg tablet Take 1 tablet (10 mg) by mouth once daily. 90 tablet 3    blood sugar diagnostic (Accu-Chek Ronda Plus test strp) strip TEST 3 TIMES DAILY.      buPROPion XL (Wellbutrin XL) 150 mg 24 hr tablet Take 1 tablet (150 mg) by mouth once daily in the morning. Do not crush, chew, or split. 30 tablet 1    carvedilol (Coreg) 12.5 mg tablet Take 1 tablet (12.5 mg) by mouth 2 times a day. 180 tablet 3    cetirizine (ZyrTEC) 10 mg tablet Take 1 tablet (10 mg) by mouth once daily.      fenofibrate (Triglide) 160 mg tablet Take 1 tablet (160 mg) by mouth once daily. 90 tablet 3    fluticasone (Flonase) 50 mcg/actuation nasal spray Administer 2 sprays into each nostril 2 times a day.      gabapentin (Neurontin) 100 mg capsule Take 1 capsule (100 mg) by mouth 2 times a day. (Patient taking differently: Take 1 capsule (100 mg) by mouth once daily.) 180 capsule 3    insulin glargine (Lantus) 100 unit/mL (3 mL) pen 40 Units.      insulin lispro (HumaLOG) 100 unit/mL injection Per scale      latanoprost (Xalatan) 0.005 % ophthalmic solution Latanoprost 0.005 % Ophthalmic Solution  Quantity: 2   Refills: 0  Start: 27-May-2021      LORazepam (Ativan) 0.5 mg tablet Take 1 tablet (0.5 mg) by mouth once daily as needed for anxiety. 30 tablet 0    magnesium oxide,aspartate,citr (Triple Magnesium Complex) 400 mg magnesium capsule TAKE 3 CAPSULE Daily      mecobalamin, vitamin B12, 1,000 mcg tablet,disintegrating Place 1 tablet under the tongue once daily. 90 tablet 3    omeprazole (PriLOSEC) 40 mg DR capsule TAKE ONE CAPSULE BY MOUTH DAILY 90 capsule 0    polyethylene glycol-electrolytes 420 gram solution TAKE AS DIRECTED.    "   semaglutide (Ozempic) 1 mg/dose (4 mg/3 mL) pen injector 0.5 mg 1 (one) time per week.      sertraline (Zoloft) 100 mg tablet Take 1 tablet (100 mg) by mouth once daily. 30 tablet 5    [DISCONTINUED] buPROPion XL (Wellbutrin XL) 300 mg 24 hr tablet Take 1 tablet (300 mg) by mouth once daily in the morning. Do not crush, chew, or split. 30 tablet 11     No current facility-administered medications on file prior to visit.       All:   Allergies   Allergen Reactions    Latex Unknown    Sulfamethoxazole-Trimethoprim Unknown       Fam Hx:   Family History   Problem Relation Name Age of Onset    Other (Cardiac disorder) Mother      Diabetes Mother      Hypertension Mother      Lung disease Mother      Kidney cancer Mother      Diabetes Father      Other (Cardiac disorder) Father         Soc Hx:   Social History     Socioeconomic History    Marital status:      Spouse name: Not on file    Number of children: Not on file    Years of education: Not on file    Highest education level: Not on file   Occupational History    Not on file   Tobacco Use    Smoking status: Never    Smokeless tobacco: Never   Vaping Use    Vaping Use: Never used   Substance and Sexual Activity    Alcohol use: Not Currently     Alcohol/week: 1.0 standard drink of alcohol     Types: 1 Standard drinks or equivalent per week    Drug use: Never    Sexual activity: Not Currently   Other Topics Concern    Not on file   Social History Narrative    Not on file     Social Determinants of Health     Financial Resource Strain: Not on file   Food Insecurity: Not on file   Transportation Needs: Not on file   Physical Activity: Not on file   Stress: Not on file   Social Connections: Not on file   Intimate Partner Violence: Not on file   Housing Stability: Not on file       ROS    VS: /52 (BP Location: Right arm, Patient Position: Sitting)   Pulse 82   Ht 1.626 m (5' 4\")   Wt 76.2 kg (168 lb)   SpO2 97%   BMI 28.84 kg/m²      Physical " Exam  Vitals reviewed.   Constitutional:       Appearance: Normal appearance.   Eyes:      Pupils: Pupils are equal, round, and reactive to light.   Neck:      Vascular: No JVD.   Cardiovascular:      Rate and Rhythm: Normal rate and regular rhythm.      Pulses: Normal pulses.      Heart sounds: No murmur heard.     Gallop present. S4 sounds present.   Pulmonary:      Effort: No respiratory distress.      Breath sounds: No wheezing or rales.   Abdominal:      General: Abdomen is flat. There is no distension.      Palpations: Abdomen is soft.   Musculoskeletal:         General: No swelling.      Right lower leg: No edema.      Left lower leg: No edema.   Neurological:      General: No focal deficit present.      Mental Status: She is alert.   Psychiatric:         Mood and Affect: Mood normal.           No echocardiogram results found for the past 12 months  Encounter Date: 10/16/23   ECG 12 lead   Result Value    Ventricular Rate 73    Atrial Rate 73    DE Interval 178    QRS Duration 84    QT Interval 392    QTC Calculation(Bazett) 431    P Axis 88    R Axis -34    T Axis 34    QRS Count 12    Q Onset 227    P Onset 138    P Offset 196    T Offset 423    QTC Fredericia 418    Narrative    Normal sinus rhythm  Left axis deviation  Abnormal ECG  When compared with ECG of 05-OCT-2022 22:35,  Nonspecific T wave abnormality no longer evident in Lateral leads  Confirmed by Manan Urias ([id number]) on 10/21/2023 2:45:34 PM            Neo Euceda MD

## 2023-10-23 NOTE — TELEPHONE ENCOUNTER
Please call patient and let her know I would recommend we increase her atorvastatin to 40 mg daily.  She has carotid artery disease and renal artery disease.  If she is agreeable to that I can send in the prescription.    Please also let patient know I spoke with Dr. Acosta regarding her renal artery stenosis and she will discuss with Dr. Cruz next steps.    SDH

## 2023-10-24 ENCOUNTER — TELEPHONE (OUTPATIENT)
Dept: PRIMARY CARE | Facility: CLINIC | Age: 78
End: 2023-10-24
Payer: MEDICARE

## 2023-10-26 ENCOUNTER — APPOINTMENT (OUTPATIENT)
Dept: CARDIOLOGY | Facility: CLINIC | Age: 78
End: 2023-10-26
Payer: MEDICARE

## 2023-10-26 ENCOUNTER — TELEPHONE (OUTPATIENT)
Dept: PRIMARY CARE | Facility: CLINIC | Age: 78
End: 2023-10-26

## 2023-10-26 NOTE — TELEPHONE ENCOUNTER
Pt  called , to inform Dr Traylor  that Dr LIAM Acosta started Benazpril 40 mg  d/t  150/80 humble. Pt made aware , she will need to watch renal function.

## 2023-10-30 ENCOUNTER — TELEPHONE (OUTPATIENT)
Dept: PEDIATRICS | Facility: CLINIC | Age: 78
End: 2023-10-30
Payer: MEDICARE

## 2023-10-30 DIAGNOSIS — K21.9 GASTROESOPHAGEAL REFLUX DISEASE, UNSPECIFIED WHETHER ESOPHAGITIS PRESENT: ICD-10-CM

## 2023-10-30 RX ORDER — OMEPRAZOLE 40 MG/1
40 CAPSULE, DELAYED RELEASE ORAL DAILY
Qty: 90 CAPSULE | Refills: 3 | Status: SHIPPED | OUTPATIENT
Start: 2023-10-30 | End: 2024-05-03 | Stop reason: ALTCHOICE

## 2023-11-02 ENCOUNTER — TELEPHONE (OUTPATIENT)
Dept: PRIMARY CARE | Facility: CLINIC | Age: 78
End: 2023-11-02
Payer: MEDICARE

## 2023-11-02 DIAGNOSIS — J30.9 ALLERGIC RHINITIS, UNSPECIFIED SEASONALITY, UNSPECIFIED TRIGGER: Primary | ICD-10-CM

## 2023-11-02 RX ORDER — CETIRIZINE HYDROCHLORIDE 10 MG/1
10 TABLET ORAL DAILY
Qty: 90 TABLET | Refills: 3 | Status: SHIPPED | OUTPATIENT
Start: 2023-11-02 | End: 2024-11-01

## 2023-11-02 NOTE — TELEPHONE ENCOUNTER
Needs to have cetirizine (ZyrTEC) 10 mg tablet refilled. Send to [id number] - [place], [place] - [address] [place] [address]

## 2023-11-07 ENCOUNTER — HOSPITAL ENCOUNTER (EMERGENCY)
Facility: HOSPITAL | Age: 78
Discharge: HOME | End: 2023-11-07
Attending: STUDENT IN AN ORGANIZED HEALTH CARE EDUCATION/TRAINING PROGRAM
Payer: MEDICARE

## 2023-11-07 ENCOUNTER — HOSPITAL ENCOUNTER (OUTPATIENT)
Dept: CARDIOLOGY | Facility: HOSPITAL | Age: 78
Discharge: HOME | End: 2023-11-07
Payer: MEDICARE

## 2023-11-07 ENCOUNTER — HOSPITAL ENCOUNTER (OUTPATIENT)
Dept: RADIOLOGY | Facility: HOSPITAL | Age: 78
Discharge: HOME | End: 2023-11-07
Payer: MEDICARE

## 2023-11-07 VITALS
HEIGHT: 65 IN | TEMPERATURE: 98.2 F | HEART RATE: 70 BPM | OXYGEN SATURATION: 100 % | SYSTOLIC BLOOD PRESSURE: 159 MMHG | RESPIRATION RATE: 18 BRPM | WEIGHT: 165 LBS | DIASTOLIC BLOOD PRESSURE: 74 MMHG | BODY MASS INDEX: 27.49 KG/M2

## 2023-11-07 DIAGNOSIS — R11.2 NAUSEA AND VOMITING, UNSPECIFIED VOMITING TYPE: Primary | ICD-10-CM

## 2023-11-07 DIAGNOSIS — I67.1 INTRACRANIAL ANEURYSM (HHS-HCC): ICD-10-CM

## 2023-11-07 LAB
ALBUMIN SERPL BCP-MCNC: 4.4 G/DL (ref 3.4–5)
ALP SERPL-CCNC: 40 U/L (ref 33–136)
ALT SERPL W P-5'-P-CCNC: 14 U/L (ref 7–45)
ANION GAP SERPL CALC-SCNC: 13 MMOL/L (ref 10–20)
APPEARANCE UR: CLEAR
AST SERPL W P-5'-P-CCNC: 18 U/L (ref 9–39)
ATRIAL RATE: 87 BPM
BASOPHILS # BLD AUTO: 0.03 X10*3/UL (ref 0–0.1)
BASOPHILS NFR BLD AUTO: 0.5 %
BILIRUB SERPL-MCNC: 0.7 MG/DL (ref 0–1.2)
BILIRUB UR STRIP.AUTO-MCNC: NEGATIVE MG/DL
BUN SERPL-MCNC: 37 MG/DL (ref 6–23)
CALCIUM SERPL-MCNC: 9.9 MG/DL (ref 8.6–10.3)
CARDIAC TROPONIN I PNL SERPL HS: 6 NG/L (ref 0–13)
CHLORIDE SERPL-SCNC: 99 MMOL/L (ref 98–107)
CO2 SERPL-SCNC: 25 MMOL/L (ref 21–32)
COLOR UR: ABNORMAL
CREAT SERPL-MCNC: 2.27 MG/DL (ref 0.5–1.05)
EOSINOPHIL # BLD AUTO: 0.06 X10*3/UL (ref 0–0.4)
EOSINOPHIL NFR BLD AUTO: 1 %
ERYTHROCYTE [DISTWIDTH] IN BLOOD BY AUTOMATED COUNT: 12.9 % (ref 11.5–14.5)
GFR SERPL CREATININE-BSD FRML MDRD: 22 ML/MIN/1.73M*2
GLUCOSE SERPL-MCNC: 173 MG/DL (ref 74–99)
GLUCOSE UR STRIP.AUTO-MCNC: NEGATIVE MG/DL
HCT VFR BLD AUTO: 35.8 % (ref 36–46)
HGB BLD-MCNC: 12.3 G/DL (ref 12–16)
HOLD SPECIMEN: NORMAL
IMM GRANULOCYTES # BLD AUTO: 0.03 X10*3/UL (ref 0–0.5)
IMM GRANULOCYTES NFR BLD AUTO: 0.5 % (ref 0–0.9)
KETONES UR STRIP.AUTO-MCNC: NEGATIVE MG/DL
LEUKOCYTE ESTERASE UR QL STRIP.AUTO: NEGATIVE
LIPASE SERPL-CCNC: 70 U/L (ref 9–82)
LYMPHOCYTES # BLD AUTO: 1.1 X10*3/UL (ref 0.8–3)
LYMPHOCYTES NFR BLD AUTO: 19 %
MAGNESIUM SERPL-MCNC: 1.81 MG/DL (ref 1.6–2.4)
MCH RBC QN AUTO: 27.6 PG (ref 26–34)
MCHC RBC AUTO-ENTMCNC: 34.4 G/DL (ref 32–36)
MCV RBC AUTO: 80 FL (ref 80–100)
MONOCYTES # BLD AUTO: 0.24 X10*3/UL (ref 0.05–0.8)
MONOCYTES NFR BLD AUTO: 4.2 %
NEUTROPHILS # BLD AUTO: 4.32 X10*3/UL (ref 1.6–5.5)
NEUTROPHILS NFR BLD AUTO: 74.8 %
NITRITE UR QL STRIP.AUTO: NEGATIVE
NRBC BLD-RTO: 0 /100 WBCS (ref 0–0)
P AXIS: -46 DEGREES
P OFFSET: 209 MS
P ONSET: 140 MS
PH UR STRIP.AUTO: 7 [PH]
PLATELET # BLD AUTO: 189 X10*3/UL (ref 150–450)
POTASSIUM SERPL-SCNC: 4.4 MMOL/L (ref 3.5–5.3)
PR INTERVAL: 172 MS
PROT SERPL-MCNC: 7.2 G/DL (ref 6.4–8.2)
PROT UR STRIP.AUTO-MCNC: NEGATIVE MG/DL
Q ONSET: 226 MS
QRS COUNT: 13 BEATS
QRS DURATION: 86 MS
QT INTERVAL: 396 MS
QTC CALCULATION(BAZETT): 454 MS
QTC FREDERICIA: 434 MS
R AXIS: -40 DEGREES
RBC # BLD AUTO: 4.46 X10*6/UL (ref 4–5.2)
RBC # UR STRIP.AUTO: NEGATIVE /UL
SODIUM SERPL-SCNC: 133 MMOL/L (ref 136–145)
SP GR UR STRIP.AUTO: 1
T AXIS: 66 DEGREES
T OFFSET: 424 MS
UROBILINOGEN UR STRIP.AUTO-MCNC: <2 MG/DL
VENTRICULAR RATE: 79 BPM
WBC # BLD AUTO: 5.8 X10*3/UL (ref 4.4–11.3)

## 2023-11-07 PROCEDURE — 81003 URINALYSIS AUTO W/O SCOPE: CPT | Performed by: STUDENT IN AN ORGANIZED HEALTH CARE EDUCATION/TRAINING PROGRAM

## 2023-11-07 PROCEDURE — 85025 COMPLETE CBC W/AUTO DIFF WBC: CPT | Performed by: STUDENT IN AN ORGANIZED HEALTH CARE EDUCATION/TRAINING PROGRAM

## 2023-11-07 PROCEDURE — 99284 EMERGENCY DEPT VISIT MOD MDM: CPT | Mod: 25

## 2023-11-07 PROCEDURE — 83735 ASSAY OF MAGNESIUM: CPT | Performed by: STUDENT IN AN ORGANIZED HEALTH CARE EDUCATION/TRAINING PROGRAM

## 2023-11-07 PROCEDURE — 93005 ELECTROCARDIOGRAM TRACING: CPT

## 2023-11-07 PROCEDURE — 99285 EMERGENCY DEPT VISIT HI MDM: CPT | Performed by: STUDENT IN AN ORGANIZED HEALTH CARE EDUCATION/TRAINING PROGRAM

## 2023-11-07 PROCEDURE — 80053 COMPREHEN METABOLIC PANEL: CPT | Performed by: STUDENT IN AN ORGANIZED HEALTH CARE EDUCATION/TRAINING PROGRAM

## 2023-11-07 PROCEDURE — 70544 MR ANGIOGRAPHY HEAD W/O DYE: CPT | Performed by: RADIOLOGY

## 2023-11-07 PROCEDURE — 70544 MR ANGIOGRAPHY HEAD W/O DYE: CPT

## 2023-11-07 PROCEDURE — 2500000004 HC RX 250 GENERAL PHARMACY W/ HCPCS (ALT 636 FOR OP/ED): Performed by: STUDENT IN AN ORGANIZED HEALTH CARE EDUCATION/TRAINING PROGRAM

## 2023-11-07 PROCEDURE — 84484 ASSAY OF TROPONIN QUANT: CPT | Performed by: STUDENT IN AN ORGANIZED HEALTH CARE EDUCATION/TRAINING PROGRAM

## 2023-11-07 PROCEDURE — 96374 THER/PROPH/DIAG INJ IV PUSH: CPT

## 2023-11-07 PROCEDURE — 36415 COLL VENOUS BLD VENIPUNCTURE: CPT | Performed by: STUDENT IN AN ORGANIZED HEALTH CARE EDUCATION/TRAINING PROGRAM

## 2023-11-07 PROCEDURE — 83690 ASSAY OF LIPASE: CPT | Performed by: STUDENT IN AN ORGANIZED HEALTH CARE EDUCATION/TRAINING PROGRAM

## 2023-11-07 RX ORDER — ONDANSETRON HYDROCHLORIDE 2 MG/ML
4 INJECTION, SOLUTION INTRAVENOUS ONCE
Status: COMPLETED | OUTPATIENT
Start: 2023-11-07 | End: 2023-11-07

## 2023-11-07 RX ADMIN — SODIUM CHLORIDE 1000 ML: 9 INJECTION, SOLUTION INTRAVENOUS at 10:58

## 2023-11-07 RX ADMIN — ONDANSETRON 4 MG: 2 INJECTION INTRAMUSCULAR; INTRAVENOUS at 12:40

## 2023-11-07 ASSESSMENT — LIFESTYLE VARIABLES
EVER FELT BAD OR GUILTY ABOUT YOUR DRINKING: NO
EVER HAD A DRINK FIRST THING IN THE MORNING TO STEADY YOUR NERVES TO GET RID OF A HANGOVER: NO
EVER HAD A DRINK FIRST THING IN THE MORNING TO STEADY YOUR NERVES TO GET RID OF A HANGOVER: NO
EVER FELT BAD OR GUILTY ABOUT YOUR DRINKING: NO
HAVE YOU EVER FELT YOU SHOULD CUT DOWN ON YOUR DRINKING: NO
REASON UNABLE TO ASSESS: NO
HAVE YOU EVER FELT YOU SHOULD CUT DOWN ON YOUR DRINKING: NO
HAVE PEOPLE ANNOYED YOU BY CRITICIZING YOUR DRINKING: NO
REASON UNABLE TO ASSESS: YES

## 2023-11-07 ASSESSMENT — PAIN SCALES - GENERAL: PAINLEVEL_OUTOF10: 0 - NO PAIN

## 2023-11-07 ASSESSMENT — COLUMBIA-SUICIDE SEVERITY RATING SCALE - C-SSRS
1. IN THE PAST MONTH, HAVE YOU WISHED YOU WERE DEAD OR WISHED YOU COULD GO TO SLEEP AND NOT WAKE UP?: NO
6. HAVE YOU EVER DONE ANYTHING, STARTED TO DO ANYTHING, OR PREPARED TO DO ANYTHING TO END YOUR LIFE?: NO
2. HAVE YOU ACTUALLY HAD ANY THOUGHTS OF KILLING YOURSELF?: NO

## 2023-11-07 ASSESSMENT — PAIN - FUNCTIONAL ASSESSMENT: PAIN_FUNCTIONAL_ASSESSMENT: 0-10

## 2023-11-07 NOTE — ED PROVIDER NOTES
HPI   Chief Complaint   Patient presents with    Vomiting       78-year-old female with past medical history significant for anxiety and hypertension who presents to the emergency department with feelings of anxiety and nausea that have been waxing and waning over the last several weeks.  Patient states that she is on bupropion, and trying to transition to Zoloft for her anxiety, but the nausea became such that she was unable to manage it at home and she vomited once yesterday.  She has no pain, no difficulty breathing, no chest pain, is moving her bowels normally without diarrhea or constipation.  Denies urinary symptoms.  Denies headaches or vision changes at this time.    Of note patient is due for an MRA today for surveillance on a known aneurysm.                          No data recorded                Patient History   Past Medical History:   Diagnosis Date    Contusion of unspecified part of head, initial encounter     Head contusion    Hyperlipidemia     Hypertension     Other chest pain 03/05/2018    Chest heaviness    Personal history of diseases of the skin and subcutaneous tissue 08/16/2019    History of folliculitis    Personal history of malignant neoplasm of other parts of uterus     History of malignant neoplasm of endometrium    Personal history of other diseases of the respiratory system 08/21/2020    History of sinusitis    Personal history of other diseases of the respiratory system 09/13/2020    History of acute sinusitis    Personal history of other endocrine, nutritional and metabolic disease     History of diabetes mellitus    Renal disorder     Type 2 diabetes mellitus with other diabetic neurological complication (CMS/[facility]) 11/30/2020    Diabetes mellitus with neurological manifestations    Type 2 diabetes mellitus with other skin complications (CMS/[facility]) 03/18/2022    Diabetic foot infection    Type 2 diabetes mellitus with other skin ulcer (CODE) (CMS/[facility])     Diabetic pressure ulcer, stage  1     Past Surgical History:   Procedure Laterality Date    CHOLECYSTECTOMY  11/11/2014    Cholecystectomy    HYSTERECTOMY  11/11/2014    Hysterectomy    MR HEAD ANGIO WO IV CONTRAST  1/29/2021    MR HEAD ANGIO WO IV CONTRAST 1/29/2021 [facility]    OTHER SURGICAL HISTORY  06/11/2019    Breast biopsy    OTHER SURGICAL HISTORY  06/11/2019    Thyroidectomy    OTHER SURGICAL HISTORY  06/11/2019    Hip surgery    TONSILLECTOMY  11/11/2014    Tonsillectomy     Family History   Problem Relation Name Age of Onset    Other (Cardiac disorder) Mother      Diabetes Mother      Hypertension Mother      Lung disease Mother      Kidney cancer Mother      Diabetes Father      Other (Cardiac disorder) Father       Social History     Tobacco Use    Smoking status: Never    Smokeless tobacco: Never   Vaping Use    Vaping Use: Never used   Substance Use Topics    Alcohol use: Not Currently     Alcohol/week: 1.0 standard drink of alcohol     Types: 1 Standard drinks or equivalent per week    Drug use: Never       Physical Exam   ED Triage Vitals [11/07/23 1002]   Temp Heart Rate Resp BP   36.8 °C (98.2 °F) 89 18 105/55      SpO2 Temp src Heart Rate Source Patient Position   97 % -- -- --      BP Location FiO2 (%)     -- --       Physical Exam  Vitals and nursing note reviewed.   Constitutional:       General: She is not in acute distress.     Appearance: She is well-developed.   HENT:      Head: Normocephalic and atraumatic.   Eyes:      Conjunctiva/sclera: Conjunctivae normal.   Cardiovascular:      Rate and Rhythm: Normal rate and regular rhythm.      Heart sounds: No murmur heard.  Pulmonary:      Effort: Pulmonary effort is normal. No respiratory distress.      Breath sounds: Normal breath sounds.   Abdominal:      Palpations: Abdomen is soft.      Tenderness: There is no abdominal tenderness.   Musculoskeletal:         General: No swelling.      Cervical back: Neck supple.   Skin:     General: Skin is warm and dry.       Capillary Refill: Capillary refill takes less than 2 seconds.   Neurological:      Mental Status: She is alert.   Psychiatric:         Mood and Affect: Mood normal.         ED Course & MDM   Diagnoses as of 11/09/23 2216   Nausea and vomiting, unspecified vomiting type       Medical Decision Making  History obtained from: Patient    External records reviewed: I reviewed external records including outpatient, PCP records, and prior discharge summaries    ED Course: Patient was given Zofran and a fluid bolus, and cardiac work-up initiated.EKG was nonischemic.  She had no gross electrolyte derangements, normal liver function, and her creatinine is about 2.27, which is consistent with prior values.  Troponins are negative, magnesium was 1.81.  No leukocytosis or signs of anemia or other concerning findings, urinalysis grossly clear.  Felt improved following her fluids and she was discharged in stable condition    I have reviewed this case with the ED attending physician, and the attending agrees with the plan. Patient or family was counselled regarding labs, imaging, likely diagnosis, and plan. All questions were answered.     Jennifer Jefferson DO  PGY-4, emergency medicine    The above documentation was completed with the use of speech recognition software. It may contain dictation errors secondary to limitations of the software.          Procedure  Procedures     Jennifer Jefferson DO  Resident  11/09/23 1341       Jennifer Jefferson DO  Resident  11/09/23 2217

## 2023-11-07 NOTE — ED TRIAGE NOTES
Pt presents to ED c/o n/v, anxiety and loss of appetite x3 days. Pt states she feels she is going to jump out of her skin at times and this has woken her up a few times. Pt states last time she had these sx was 2 weeks ago and she states her primary started her on Zoloft at that time. Pt states her stomach is in knots and she feels nervous. Pt denies any depression at this time. Pt feels she may be having ongoing panic attacks. States she feels dehydrated. Pt is Aox3 with clear speech and able to answer all questions for herself. Pt is here with her .

## 2023-11-07 NOTE — DISCHARGE INSTRUCTIONS
If you develop fevers, if you have nausea and vomiting that prevents you from keeping fluids down, if your chest pain does not improve, or if you become significantly short of breath, or if your symptoms worsen, or for any additional concerns, return to the emergency department for evaluation.

## 2023-11-09 ENCOUNTER — OFFICE VISIT (OUTPATIENT)
Dept: PRIMARY CARE | Facility: CLINIC | Age: 78
End: 2023-11-09
Payer: MEDICARE

## 2023-11-09 ENCOUNTER — TELEPHONE (OUTPATIENT)
Dept: PRIMARY CARE | Facility: CLINIC | Age: 78
End: 2023-11-09

## 2023-11-09 ENCOUNTER — OFFICE VISIT (OUTPATIENT)
Dept: ENDOCRINOLOGY | Facility: CLINIC | Age: 78
End: 2023-11-09
Payer: MEDICARE

## 2023-11-09 VITALS
DIASTOLIC BLOOD PRESSURE: 60 MMHG | SYSTOLIC BLOOD PRESSURE: 130 MMHG | WEIGHT: 162 LBS | TEMPERATURE: 98.9 F | HEIGHT: 65 IN | OXYGEN SATURATION: 97 % | HEART RATE: 80 BPM | RESPIRATION RATE: 14 BRPM | BODY MASS INDEX: 26.99 KG/M2

## 2023-11-09 VITALS — HEIGHT: 65 IN | BODY MASS INDEX: 26.99 KG/M2 | WEIGHT: 162 LBS

## 2023-11-09 DIAGNOSIS — F41.9 ANXIETY: ICD-10-CM

## 2023-11-09 DIAGNOSIS — E11.22 CKD STAGE 3 SECONDARY TO DIABETES (MULTI): ICD-10-CM

## 2023-11-09 DIAGNOSIS — Z79.4 TYPE 2 DIABETES MELLITUS WITH HYPERGLYCEMIA, WITH LONG-TERM CURRENT USE OF INSULIN (MULTI): Primary | ICD-10-CM

## 2023-11-09 DIAGNOSIS — F39 MOOD DISORDER (CMS-HCC): ICD-10-CM

## 2023-11-09 DIAGNOSIS — I10 ESSENTIAL HYPERTENSION: Primary | ICD-10-CM

## 2023-11-09 DIAGNOSIS — N18.30 CKD STAGE 3 SECONDARY TO DIABETES (MULTI): ICD-10-CM

## 2023-11-09 DIAGNOSIS — Z97.8 USES SELF-APPLIED CONTINUOUS GLUCOSE MONITORING DEVICE: ICD-10-CM

## 2023-11-09 DIAGNOSIS — E11.65 TYPE 2 DIABETES MELLITUS WITH HYPERGLYCEMIA, WITH LONG-TERM CURRENT USE OF INSULIN (MULTI): Primary | ICD-10-CM

## 2023-11-09 DIAGNOSIS — G47.00 INSOMNIA, UNSPECIFIED TYPE: ICD-10-CM

## 2023-11-09 LAB
POC FINGERSTICK BLOOD GLUCOSE: 192 MG/DL (ref 70–100)
POC HEMOGLOBIN A1C: 6 % (ref 4.2–6.5)

## 2023-11-09 PROCEDURE — 99214 OFFICE O/P EST MOD 30 MIN: CPT | Performed by: HOSPITALIST

## 2023-11-09 PROCEDURE — 1160F RVW MEDS BY RX/DR IN RCRD: CPT | Performed by: INTERNAL MEDICINE

## 2023-11-09 PROCEDURE — 1160F RVW MEDS BY RX/DR IN RCRD: CPT | Performed by: HOSPITALIST

## 2023-11-09 PROCEDURE — 3078F DIAST BP <80 MM HG: CPT | Performed by: HOSPITALIST

## 2023-11-09 PROCEDURE — 1159F MED LIST DOCD IN RCRD: CPT | Performed by: HOSPITALIST

## 2023-11-09 PROCEDURE — 1125F AMNT PAIN NOTED PAIN PRSNT: CPT | Performed by: INTERNAL MEDICINE

## 2023-11-09 PROCEDURE — 1159F MED LIST DOCD IN RCRD: CPT | Performed by: INTERNAL MEDICINE

## 2023-11-09 PROCEDURE — 1125F AMNT PAIN NOTED PAIN PRSNT: CPT | Performed by: HOSPITALIST

## 2023-11-09 PROCEDURE — 3075F SYST BP GE 130 - 139MM HG: CPT | Performed by: HOSPITALIST

## 2023-11-09 PROCEDURE — 3075F SYST BP GE 130 - 139MM HG: CPT | Performed by: INTERNAL MEDICINE

## 2023-11-09 PROCEDURE — 1036F TOBACCO NON-USER: CPT | Performed by: HOSPITALIST

## 2023-11-09 PROCEDURE — 95251 CONT GLUC MNTR ANALYSIS I&R: CPT | Performed by: HOSPITALIST

## 2023-11-09 PROCEDURE — 83036 HEMOGLOBIN GLYCOSYLATED A1C: CPT | Performed by: HOSPITALIST

## 2023-11-09 PROCEDURE — 1036F TOBACCO NON-USER: CPT | Performed by: INTERNAL MEDICINE

## 2023-11-09 PROCEDURE — 82962 GLUCOSE BLOOD TEST: CPT | Performed by: HOSPITALIST

## 2023-11-09 PROCEDURE — 3078F DIAST BP <80 MM HG: CPT | Performed by: INTERNAL MEDICINE

## 2023-11-09 PROCEDURE — 99214 OFFICE O/P EST MOD 30 MIN: CPT | Performed by: INTERNAL MEDICINE

## 2023-11-09 RX ORDER — INSULIN GLARGINE 100 [IU]/ML
INJECTION, SOLUTION SUBCUTANEOUS
Qty: 18 ML | Refills: 3 | Status: SHIPPED | OUTPATIENT
Start: 2023-11-09

## 2023-11-09 RX ORDER — NORTRIPTYLINE HYDROCHLORIDE 10 MG/1
30 CAPSULE ORAL NIGHTLY
Qty: 90 CAPSULE | Refills: 5 | Status: SHIPPED | OUTPATIENT
Start: 2023-11-09 | End: 2023-12-04 | Stop reason: SDUPTHER

## 2023-11-09 RX ORDER — INSULIN LISPRO 100 [IU]/ML
INJECTION, SOLUTION INTRAVENOUS; SUBCUTANEOUS
Qty: 18 ML | Refills: 3 | Status: SHIPPED | OUTPATIENT
Start: 2023-11-09

## 2023-11-09 ASSESSMENT — ENCOUNTER SYMPTOMS
NAUSEA: 0
NAUSEA: 1
DIARRHEA: 0
WHEEZING: 0
VOMITING: 1
EYE ITCHING: 0
COUGH: 0
AGITATION: 0
CHEST TIGHTNESS: 0
HEADACHES: 0
PHOTOPHOBIA: 0
LIGHT-HEADEDNESS: 0
DYSURIA: 0
SHORTNESS OF BREATH: 0
CONSTITUTIONAL NEGATIVE: 1
SORE THROAT: 0
CONSTIPATION: 0
FREQUENCY: 0
PALPITATIONS: 1
ABDOMINAL PAIN: 0
ABDOMINAL DISTENTION: 0
ARTHRALGIAS: 0
ABDOMINAL PAIN: 0
SHORTNESS OF BREATH: 0
NERVOUS/ANXIOUS: 1
CONSTIPATION: 0
DIARRHEA: 0
PALPITATIONS: 0
TROUBLE SWALLOWING: 0
TREMORS: 0
SLEEP DISTURBANCE: 1
VOICE CHANGE: 0

## 2023-11-09 NOTE — TELEPHONE ENCOUNTER
Patient lm  If zoloft wears off and she starts to feel anxious before bedtime, what should she do?

## 2023-11-09 NOTE — PROGRESS NOTES
"Subjective   Patient ID: Elma Hernandez is a 78 y.o. female who presents for Diabetes (dx dm: 1990/[facility] DM: father /PCP: Filiatrweston /Podiatrist: Ami /Eye exam: every 2 month / /Patient testing glucose 4 times daily, with freestyle jose manuel 2 READER. due to fluctuating blood glucose, hypoglycemia and 4 insulin injections. /Patient is injecting meal time insulin 3 times daily based on glucose reading and sliding scale./Patient compliant with diabetes regimen. /Last hga1c 7/11/23 result 5.8%/Ozempic 0.25mg weekly for the last 2 weeks- neasea with increased dose / ).hypoglycemia overnight at times- taken care of at home; needs guidelines of when to skip Humalog if glucose is under a certain number   Lab Results   Component Value Date    HGBA1C 6.0 11/09/2023         HPI  See AP     Review of Systems   Constitutional: Negative.    HENT:  Negative for sore throat, trouble swallowing and voice change.    Eyes:  Negative for photophobia, itching and visual disturbance.   Respiratory:  Negative for chest tightness and shortness of breath.    Cardiovascular:  Positive for palpitations. Negative for chest pain.   Gastrointestinal:  Positive for nausea and vomiting. Negative for abdominal distention, abdominal pain, constipation and diarrhea.   Endocrine: Negative for cold intolerance, heat intolerance and polyuria.   Genitourinary:  Negative for dysuria and frequency.   Musculoskeletal:  Negative for arthralgias.   Skin:  Negative for pallor.   Allergic/Immunologic: Negative for environmental allergies.   Neurological:  Negative for tremors, light-headedness and headaches.   Psychiatric/Behavioral:  Positive for sleep disturbance. Negative for agitation. The patient is nervous/anxious.        Objective  Visit Vitals  Ht 1.651 m (5' 5\")   Wt 73.5 kg (162 lb)   BMI 26.96 kg/m²   OB Status Postmenopausal   Smoking Status Never   BSA 1.84 m²             Physical Exam  Constitutional:       Appearance: Normal appearance. "   HENT:      Head: Normocephalic.      Nose: Nose normal.      Mouth/Throat:      Mouth: Mucous membranes are moist.   Eyes:      Extraocular Movements: Extraocular movements intact.   Cardiovascular:      Rate and Rhythm: Normal rate.   Pulmonary:      Effort: Pulmonary effort is normal. No respiratory distress.   Abdominal:      General: There is no distension.   Musculoskeletal:         General: Normal range of motion.      Cervical back: Normal range of motion and neck supple.   Skin:     General: Skin is warm and dry.   Neurological:      Mental Status: She is alert and oriented to person, place, and time.      Comments: Microfilament test- neuropathy in toes +   Psychiatric:         Mood and Affect: Mood normal.         Assessment/Plan   Diagnoses and all orders for this visit:  Type 2 diabetes mellitus with hyperglycemia, with long-term current use of insulin (CMS/[facility])  -     POCT glucose manually resulted  -     POCT glycosylated hemoglobin (Hb A1C) manually resulted  -     insulin glargine (Lantus) 100 unit/mL (3 mL) pen; 40 Units every night  -     insulin lispro (HumaLOG) 100 unit/mL injection; 12-12-12-0 +scale TID 2 units for every 50 over 200  CKD stage 3 secondary to diabetes (CMS/[facility])  Anxiety  Uses self-applied continuous glucose monitoring device    T2 DM , controlled complicated with neuropathy and retinopathy ( getting monthly injections )     current regimen:   Humalog 14-14-14-0 +scale TID 2 units for every 50 over 200  Lantus 0-0-0-40  ozempic 0.25mg once weekly- she was taking 0.5 mg weekly , cut back to 025 given severe nausea - continues to have nausea on 0.2 5mg weekly dose.     A1c at goal   Personally reviewed CGM and interpreted the results  active 93 % , TIR 83 % , 2 % low BG , takes humalog after eating   she is on donut hole and paying a lot for ozempic     Interval h/o :feels more anxiety. Started on zoloft recently. Starting going to see therapy as well. Sleeps until 4 am and  unable to sleep again.     PLAN :   - STOP ozempic due to nausea even on 0.25 mg   - DECREASE lantus to 38units   - change humalog 12-12-12-0 , same SSI  , take 15 min before eating as she is having post prandial hypoglycemia  - hypertG on statin and fenofibrate.     also has retinopathy,  - STOPPED ozempic due to GI issues though      CKD : has started following Dr. Acosta      PAD : follows vascular       HyeprTG: familial    on statin and fenofibrate.   see above. not on vascepa , if TG high will add vascepa     Anxiety : following with Dr. Traylor- unsure what to do it zoloft wears off and she feels anxious. She will follow with him    TFT nml    RTC 4-5 m      - aug 25 - 50 yrs of marriage.     on ozempic  went to [place] in sept 2022   kids- 3 kids- 8 GK , 3 are from previous partner. 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   live in [facility] [place]    [place].    [facility] and [facility]   Having issues with anxiety

## 2023-11-09 NOTE — PROGRESS NOTES
"Subjective   Patient ID: Elma Hernandez is a 78 y.o. female who presents for Hospital Follow-up (Anxiety.).    She admits to anxiety and even panic attacks.  She has had stressful life issues.  Trouble sleeping.  She went to ER for panic attack and racing heart.    We reviewed ER results.    She has been monitoring her BP and ranging from 120s-170s.      Review of Systems   Respiratory:  Negative for cough, shortness of breath and wheezing.    Cardiovascular:  Negative for chest pain and palpitations.   Gastrointestinal:  Negative for abdominal pain, constipation, diarrhea and nausea.     Objective   /60 (BP Location: Left arm, Patient Position: Sitting, BP Cuff Size: Adult)   Pulse 80   Temp 37.2 °C (98.9 °F) (Tympanic)   Resp 14   Ht 1.651 m (5' 5\")   Wt 73.5 kg (162 lb)   SpO2 97%   BMI 26.96 kg/m²     Physical Exam  Vitals reviewed.   Constitutional:       Appearance: Normal appearance.   HENT:      Head: Normocephalic.   Cardiovascular:      Rate and Rhythm: Normal rate and regular rhythm.   Pulmonary:      Effort: Pulmonary effort is normal.      Breath sounds: Normal breath sounds.   Musculoskeletal:         General: Normal range of motion.   Skin:     General: Skin is warm and dry.   Neurological:      General: No focal deficit present.      Mental Status: She is alert.   Psychiatric:         Mood and Affect: Mood normal.         Assessment/Plan   Problem List Items Addressed This Visit             ICD-10-CM    Anxiety F41.9    Relevant Orders    Referral to Psychiatry    CKD stage 3 secondary to diabetes (CMS/HCC) E11.22, N18.30    Essential hypertension - Primary I10    Mood disorder (CMS/[facility]) F39     Other Visit Diagnoses         Codes    Insomnia, unspecified type     G47.00    Relevant Medications    nortriptyline (Pamelor) 10 mg capsule        We reviewed and discussed all of the above.    Discussed patient's current blood pressure and discussed goal blood pressure of 120/80.  We " discussed the benefit of low salt diet and regular physical activity of at least 150 min/week.  We discussed checking their blood pressure outside of the office 2-3 x/week.  Patient is to document their results and notify the office if blood pressure results are regularly over 130/85.  All questions answered.     We discussed labs results/CKD and the benefits of good hydration and avoidance of NSAIDs (ibuprofen, motrin, aleve, naproxen, etc).  Tylenol (acetaminophen is ok for aches and pain).   We discussed her anxiety and medications.  She has an appointment with psychologist soon as well.    Follow up in 2 weeks.

## 2023-11-10 ENCOUNTER — TELEPHONE (OUTPATIENT)
Dept: PRIMARY CARE | Facility: CLINIC | Age: 78
End: 2023-11-10
Payer: MEDICARE

## 2023-11-10 ENCOUNTER — APPOINTMENT (OUTPATIENT)
Dept: CARDIOLOGY | Facility: CLINIC | Age: 78
End: 2023-11-10
Payer: MEDICARE

## 2023-11-10 DIAGNOSIS — F41.9 ANXIETY: Primary | ICD-10-CM

## 2023-11-10 RX ORDER — HYDROXYZINE HYDROCHLORIDE 25 MG/1
25 TABLET, FILM COATED ORAL 3 TIMES DAILY PRN
Qty: 90 TABLET | Refills: 0 | Status: SHIPPED | OUTPATIENT
Start: 2023-11-10 | End: 2024-01-30 | Stop reason: WASHOUT

## 2023-11-17 ENCOUNTER — APPOINTMENT (OUTPATIENT)
Dept: PRIMARY CARE | Facility: CLINIC | Age: 78
End: 2023-11-17
Payer: MEDICARE

## 2023-11-21 ENCOUNTER — OFFICE VISIT (OUTPATIENT)
Dept: PRIMARY CARE | Facility: CLINIC | Age: 78
End: 2023-11-21
Payer: MEDICARE

## 2023-11-21 VITALS
DIASTOLIC BLOOD PRESSURE: 60 MMHG | OXYGEN SATURATION: 97 % | WEIGHT: 166 LBS | HEART RATE: 70 BPM | TEMPERATURE: 97.8 F | BODY MASS INDEX: 27.66 KG/M2 | HEIGHT: 65 IN | SYSTOLIC BLOOD PRESSURE: 110 MMHG | RESPIRATION RATE: 14 BRPM

## 2023-11-21 DIAGNOSIS — I10 ESSENTIAL HYPERTENSION: Primary | ICD-10-CM

## 2023-11-21 DIAGNOSIS — E11.65 TYPE 2 DIABETES MELLITUS WITH HYPERGLYCEMIA, UNSPECIFIED WHETHER LONG TERM INSULIN USE (MULTI): ICD-10-CM

## 2023-11-21 DIAGNOSIS — F41.9 ANXIETY: ICD-10-CM

## 2023-11-21 DIAGNOSIS — F39 MOOD DISORDER (CMS-HCC): ICD-10-CM

## 2023-11-21 PROBLEM — F33.41 RECURRENT MAJOR DEPRESSIVE DISORDER, IN PARTIAL REMISSION (CMS-HCC): Status: ACTIVE | Noted: 2023-03-30

## 2023-11-21 PROCEDURE — 3078F DIAST BP <80 MM HG: CPT | Performed by: INTERNAL MEDICINE

## 2023-11-21 PROCEDURE — 1160F RVW MEDS BY RX/DR IN RCRD: CPT | Performed by: INTERNAL MEDICINE

## 2023-11-21 PROCEDURE — 1159F MED LIST DOCD IN RCRD: CPT | Performed by: INTERNAL MEDICINE

## 2023-11-21 PROCEDURE — 99213 OFFICE O/P EST LOW 20 MIN: CPT | Performed by: INTERNAL MEDICINE

## 2023-11-21 PROCEDURE — 1125F AMNT PAIN NOTED PAIN PRSNT: CPT | Performed by: INTERNAL MEDICINE

## 2023-11-21 PROCEDURE — 3074F SYST BP LT 130 MM HG: CPT | Performed by: INTERNAL MEDICINE

## 2023-11-21 PROCEDURE — 1036F TOBACCO NON-USER: CPT | Performed by: INTERNAL MEDICINE

## 2023-11-21 ASSESSMENT — ENCOUNTER SYMPTOMS
DEPRESSION: 1
WHEEZING: 0
COUGH: 0
SHORTNESS OF BREATH: 0
NAUSEA: 0
CONSTIPATION: 0
PALPITATIONS: 0
DIARRHEA: 0

## 2023-11-21 NOTE — PROGRESS NOTES
"Subjective   Patient ID: Elma Hernandez is a 78 y.o. female who presents for Depression (Follow up .).    DepressionPatient is not experiencing: palpitations and shortness of breath.      Depression and anxiety are improving considerably.  She is taking medication and seeing a counselor.      Review of Systems   Respiratory:  Negative for cough, shortness of breath and wheezing.    Cardiovascular:  Negative for chest pain and palpitations.   Gastrointestinal:  Negative for constipation, diarrhea and nausea.   Psychiatric/Behavioral:  Positive for depression.      Objective   /60 (BP Location: Left arm, Patient Position: Sitting, BP Cuff Size: Adult)   Pulse 70   Temp 36.6 °C (97.8 °F) (Tympanic)   Resp 14   Ht 1.651 m (5' 5\")   Wt 75.3 kg (166 lb)   SpO2 97%   BMI 27.62 kg/m²     Physical Exam  Vitals reviewed.   Constitutional:       Appearance: Normal appearance.   HENT:      Head: Normocephalic.   Cardiovascular:      Rate and Rhythm: Normal rate.   Pulmonary:      Effort: Pulmonary effort is normal.   Musculoskeletal:         General: Normal range of motion.   Neurological:      General: No focal deficit present.      Mental Status: She is alert.   Psychiatric:         Mood and Affect: Mood normal.       Assessment/Plan   Problem List Items Addressed This Visit             ICD-10-CM    Anxiety F41.9    Essential hypertension - Primary I10    Relevant Orders    Basic Metabolic Panel    Mood disorder (CMS/HCC) F39    Type 2 diabetes mellitus with hyperglycemia (CMS/HCC) E11.65    Relevant Orders    Hemoglobin A1C   Anxiety and depression are improving.  We will continue current medication and stressed importance of continuing with counseling as well.  HTN is controlled.    We will continue to monitor renal function and electrolytes as well.    Follow up in 2 months - sooner if any issues.         "

## 2023-11-30 ENCOUNTER — ANCILLARY PROCEDURE (OUTPATIENT)
Dept: RADIOLOGY | Facility: CLINIC | Age: 78
End: 2023-11-30
Payer: MEDICARE

## 2023-11-30 ENCOUNTER — LAB (OUTPATIENT)
Dept: LAB | Facility: LAB | Age: 78
End: 2023-11-30
Payer: MEDICARE

## 2023-11-30 DIAGNOSIS — E79.0 HYPERURICEMIA WITHOUT SIGNS OF INFLAMMATORY ARTHRITIS AND TOPHACEOUS DISEASE: ICD-10-CM

## 2023-11-30 DIAGNOSIS — E55.9 VITAMIN D DEFICIENCY, UNSPECIFIED: ICD-10-CM

## 2023-11-30 DIAGNOSIS — N18.4 CHRONIC KIDNEY DISEASE, STAGE 4 (SEVERE) (MULTI): ICD-10-CM

## 2023-11-30 DIAGNOSIS — E11.21 TYPE 2 DIABETES MELLITUS WITH DIABETIC NEPHROPATHY (MULTI): Primary | ICD-10-CM

## 2023-11-30 DIAGNOSIS — I10 ESSENTIAL (PRIMARY) HYPERTENSION: ICD-10-CM

## 2023-11-30 LAB
25(OH)D3 SERPL-MCNC: 49 NG/ML (ref 30–100)
ALBUMIN SERPL BCP-MCNC: 4.2 G/DL (ref 3.4–5)
ANION GAP SERPL CALC-SCNC: 11 MMOL/L (ref 10–20)
BUN SERPL-MCNC: 47 MG/DL (ref 6–23)
CALCIUM SERPL-MCNC: 9.3 MG/DL (ref 8.6–10.3)
CHLORIDE SERPL-SCNC: 100 MMOL/L (ref 98–107)
CO2 SERPL-SCNC: 26 MMOL/L (ref 21–32)
CREAT SERPL-MCNC: 2.11 MG/DL (ref 0.5–1.05)
GFR SERPL CREATININE-BSD FRML MDRD: 24 ML/MIN/1.73M*2
GLUCOSE SERPL-MCNC: 182 MG/DL (ref 74–99)
PHOSPHATE SERPL-MCNC: 2.9 MG/DL (ref 2.5–4.9)
POTASSIUM SERPL-SCNC: 5.2 MMOL/L (ref 3.5–5.3)
SODIUM SERPL-SCNC: 132 MMOL/L (ref 136–145)

## 2023-11-30 PROCEDURE — 36415 COLL VENOUS BLD VENIPUNCTURE: CPT

## 2023-11-30 PROCEDURE — 71046 X-RAY EXAM CHEST 2 VIEWS: CPT | Performed by: RADIOLOGY

## 2023-11-30 PROCEDURE — 71046 X-RAY EXAM CHEST 2 VIEWS: CPT | Mod: FY

## 2023-11-30 PROCEDURE — 82306 VITAMIN D 25 HYDROXY: CPT

## 2023-11-30 PROCEDURE — 80069 RENAL FUNCTION PANEL: CPT

## 2023-12-01 ENCOUNTER — APPOINTMENT (OUTPATIENT)
Dept: PRIMARY CARE | Facility: CLINIC | Age: 78
End: 2023-12-01
Payer: MEDICARE

## 2023-12-04 ENCOUNTER — TELEPHONE (OUTPATIENT)
Dept: PRIMARY CARE | Facility: CLINIC | Age: 78
End: 2023-12-04
Payer: MEDICARE

## 2023-12-04 DIAGNOSIS — F41.9 ANXIETY: ICD-10-CM

## 2023-12-04 DIAGNOSIS — G47.00 INSOMNIA, UNSPECIFIED TYPE: ICD-10-CM

## 2023-12-04 RX ORDER — NORTRIPTYLINE HYDROCHLORIDE 10 MG/1
30 CAPSULE ORAL NIGHTLY
Qty: 90 CAPSULE | Refills: 5 | Status: SHIPPED | OUTPATIENT
Start: 2023-12-04 | End: 2024-06-01

## 2023-12-04 RX ORDER — SERTRALINE HYDROCHLORIDE 100 MG/1
200 TABLET, FILM COATED ORAL DAILY
Qty: 60 TABLET | Refills: 5 | Status: SHIPPED | OUTPATIENT
Start: 2023-12-04 | End: 2024-06-01

## 2023-12-04 NOTE — TELEPHONE ENCOUNTER
Needs RF for new sig of Zoloft .. 1.5-2 tabs daily, running out with current rx    Taking 2 tabs of Nortriptyline at bedtime. Wakes up anxious at 4 and 6 am     Please advise

## 2023-12-13 ENCOUNTER — OFFICE VISIT (OUTPATIENT)
Dept: PRIMARY CARE | Facility: CLINIC | Age: 78
End: 2023-12-13
Payer: MEDICARE

## 2023-12-13 VITALS
HEIGHT: 65 IN | WEIGHT: 168 LBS | BODY MASS INDEX: 27.99 KG/M2 | TEMPERATURE: 97.6 F | SYSTOLIC BLOOD PRESSURE: 130 MMHG | OXYGEN SATURATION: 97 % | DIASTOLIC BLOOD PRESSURE: 80 MMHG | RESPIRATION RATE: 14 BRPM | HEART RATE: 69 BPM

## 2023-12-13 DIAGNOSIS — I10 ESSENTIAL HYPERTENSION: Primary | ICD-10-CM

## 2023-12-13 DIAGNOSIS — E11.9 TYPE 2 DIABETES MELLITUS WITHOUT COMPLICATION, WITH LONG-TERM CURRENT USE OF INSULIN (MULTI): ICD-10-CM

## 2023-12-13 DIAGNOSIS — I70.1 RENAL ARTERY STENOSIS, NATIVE, BILATERAL (CMS-HCC): ICD-10-CM

## 2023-12-13 DIAGNOSIS — Z79.4 TYPE 2 DIABETES MELLITUS WITHOUT COMPLICATION, WITH LONG-TERM CURRENT USE OF INSULIN (MULTI): ICD-10-CM

## 2023-12-13 DIAGNOSIS — H93.19 TINNITUS, UNSPECIFIED LATERALITY: ICD-10-CM

## 2023-12-13 PROCEDURE — 1159F MED LIST DOCD IN RCRD: CPT | Performed by: INTERNAL MEDICINE

## 2023-12-13 PROCEDURE — 3075F SYST BP GE 130 - 139MM HG: CPT | Performed by: INTERNAL MEDICINE

## 2023-12-13 PROCEDURE — 1036F TOBACCO NON-USER: CPT | Performed by: INTERNAL MEDICINE

## 2023-12-13 PROCEDURE — 1160F RVW MEDS BY RX/DR IN RCRD: CPT | Performed by: INTERNAL MEDICINE

## 2023-12-13 PROCEDURE — 1125F AMNT PAIN NOTED PAIN PRSNT: CPT | Performed by: INTERNAL MEDICINE

## 2023-12-13 PROCEDURE — 3079F DIAST BP 80-89 MM HG: CPT | Performed by: INTERNAL MEDICINE

## 2023-12-13 PROCEDURE — 99214 OFFICE O/P EST MOD 30 MIN: CPT | Performed by: INTERNAL MEDICINE

## 2023-12-13 ASSESSMENT — ENCOUNTER SYMPTOMS
NAUSEA: 0
CONSTIPATION: 0
ABDOMINAL PAIN: 0
WHEEZING: 0
COUGH: 0
SHORTNESS OF BREATH: 0
DIARRHEA: 0
PALPITATIONS: 0

## 2023-12-13 ASSESSMENT — PATIENT HEALTH QUESTIONNAIRE - PHQ9
2. FEELING DOWN, DEPRESSED OR HOPELESS: NOT AT ALL
1. LITTLE INTEREST OR PLEASURE IN DOING THINGS: NOT AT ALL
SUM OF ALL RESPONSES TO PHQ9 QUESTIONS 1 AND 2: 0

## 2023-12-13 NOTE — PROGRESS NOTES
"Subjective   Patient ID: Elma Hernandez is a 78 y.o. female who presents for Fall (Follow up/ 2 weeks ago neg x-ray .).    Got dizzy and lost balance.  Fell into a chair.  No LOC.    Complains of left side pain.  We reviewed her recent xrays.  No issues with SOB.    We reviewed recent lab results.    She does complain of ringing in ears and is concerned about her hearing.      Review of Systems   Respiratory:  Negative for cough, shortness of breath and wheezing.    Cardiovascular:  Positive for chest pain. Negative for palpitations.         Left rib pain   Gastrointestinal:  Negative for abdominal pain, constipation, diarrhea and nausea.       Objective   /80 (BP Location: Left arm, Patient Position: Sitting, BP Cuff Size: Adult)   Pulse 69   Temp 36.4 °C (97.6 °F) (Tympanic)   Resp 14   Ht 1.651 m (5' 5\")   Wt 76.2 kg (168 lb)   SpO2 97%   BMI 27.96 kg/m²     Physical Exam  Vitals reviewed.   Constitutional:       Appearance: Normal appearance.   HENT:      Head: Normocephalic.   Cardiovascular:      Rate and Rhythm: Normal rate.   Pulmonary:      Effort: Pulmonary effort is normal.   Musculoskeletal:         General: Normal range of motion.   Neurological:      General: No focal deficit present.      Mental Status: She is alert.   Psychiatric:         Mood and Affect: Mood normal.         Assessment/Plan   Problem List Items Addressed This Visit             ICD-10-CM    Essential hypertension - Primary I10    Type 2 diabetes mellitus without complication, with long-term current use of insulin (CMS/[facility]) E11.9, Z79.4    Renal artery stenosis, native, bilateral (CMS/[facility]) I70.1     Other Visit Diagnoses         Codes    Tinnitus, unspecified laterality     H93.19    Relevant Orders    Referral to Audiology        We discussed the above.    HTN is controlled.    Renal function is stable.  We discussed need to continue to monitor BP control as well as her renal function. We will hold off on any " intervention for present time.    Stressed need for good DM control.    For her tinnitus we will proceed with hearing evaluation and proceed based on results.

## 2023-12-19 ENCOUNTER — APPOINTMENT (OUTPATIENT)
Dept: BEHAVIORAL HEALTH | Facility: CLINIC | Age: 78
End: 2023-12-19
Payer: MEDICARE

## 2023-12-22 ENCOUNTER — OFFICE VISIT (OUTPATIENT)
Dept: WOUND CARE | Facility: CLINIC | Age: 78
End: 2023-12-22
Payer: MEDICARE

## 2023-12-22 PROCEDURE — 1160F RVW MEDS BY RX/DR IN RCRD: CPT | Performed by: PODIATRIST

## 2023-12-22 PROCEDURE — 1159F MED LIST DOCD IN RCRD: CPT | Performed by: PODIATRIST

## 2023-12-22 PROCEDURE — 11042 DBRDMT SUBQ TIS 1ST 20SQCM/<: CPT | Performed by: PODIATRIST

## 2023-12-22 PROCEDURE — 99213 OFFICE O/P EST LOW 20 MIN: CPT | Mod: 25

## 2023-12-22 PROCEDURE — 1125F AMNT PAIN NOTED PAIN PRSNT: CPT | Performed by: PODIATRIST

## 2023-12-22 PROCEDURE — 11042 DBRDMT SUBQ TIS 1ST 20SQCM/<: CPT

## 2023-12-22 PROCEDURE — 1036F TOBACCO NON-USER: CPT | Performed by: PODIATRIST

## 2024-01-01 ENCOUNTER — APPOINTMENT (OUTPATIENT)
Dept: CARDIOLOGY | Facility: HOSPITAL | Age: 79
End: 2024-01-01
Payer: MEDICARE

## 2024-01-01 ENCOUNTER — HOSPITAL ENCOUNTER (EMERGENCY)
Facility: HOSPITAL | Age: 79
Discharge: HOME | End: 2024-01-01
Attending: EMERGENCY MEDICINE
Payer: MEDICARE

## 2024-01-01 VITALS
BODY MASS INDEX: 27.99 KG/M2 | RESPIRATION RATE: 18 BRPM | TEMPERATURE: 97.9 F | HEART RATE: 60 BPM | SYSTOLIC BLOOD PRESSURE: 118 MMHG | HEIGHT: 65 IN | OXYGEN SATURATION: 98 % | WEIGHT: 168 LBS | DIASTOLIC BLOOD PRESSURE: 76 MMHG

## 2024-01-01 DIAGNOSIS — L03.116 CELLULITIS OF LEFT LOWER EXTREMITY: Primary | ICD-10-CM

## 2024-01-01 DIAGNOSIS — R60.0 LOCALIZED EDEMA: ICD-10-CM

## 2024-01-01 PROCEDURE — 93971 EXTREMITY STUDY: CPT | Performed by: INTERNAL MEDICINE

## 2024-01-01 PROCEDURE — 99284 EMERGENCY DEPT VISIT MOD MDM: CPT | Mod: 25

## 2024-01-01 PROCEDURE — 2500000001 HC RX 250 WO HCPCS SELF ADMINISTERED DRUGS (ALT 637 FOR MEDICARE OP): Performed by: EMERGENCY MEDICINE

## 2024-01-01 PROCEDURE — 99283 EMERGENCY DEPT VISIT LOW MDM: CPT | Performed by: EMERGENCY MEDICINE

## 2024-01-01 PROCEDURE — 93971 EXTREMITY STUDY: CPT

## 2024-01-01 PROCEDURE — 99284 EMERGENCY DEPT VISIT MOD MDM: CPT

## 2024-01-01 RX ORDER — AMOXICILLIN AND CLAVULANATE POTASSIUM 875; 125 MG/1; MG/1
1 TABLET, FILM COATED ORAL EVERY 12 HOURS
Qty: 14 TABLET | Refills: 0 | Status: SHIPPED | OUTPATIENT
Start: 2024-01-01 | End: 2024-01-05 | Stop reason: SDUPTHER

## 2024-01-01 RX ORDER — AMOXICILLIN AND CLAVULANATE POTASSIUM 875; 125 MG/1; MG/1
1 TABLET, FILM COATED ORAL ONCE
Status: COMPLETED | OUTPATIENT
Start: 2024-01-01 | End: 2024-01-01

## 2024-01-01 RX ADMIN — AMOXICILLIN AND CLAVULANATE POTASSIUM 875 MG: 875; 125 TABLET, FILM COATED ORAL at 13:55

## 2024-01-01 ASSESSMENT — LIFESTYLE VARIABLES
HAVE YOU EVER FELT YOU SHOULD CUT DOWN ON YOUR DRINKING: NO
REASON UNABLE TO ASSESS: NO
EVER HAD A DRINK FIRST THING IN THE MORNING TO STEADY YOUR NERVES TO GET RID OF A HANGOVER: NO
EVER FELT BAD OR GUILTY ABOUT YOUR DRINKING: NO
HAVE PEOPLE ANNOYED YOU BY CRITICIZING YOUR DRINKING: NO

## 2024-01-01 ASSESSMENT — PAIN - FUNCTIONAL ASSESSMENT: PAIN_FUNCTIONAL_ASSESSMENT: 0-10

## 2024-01-01 ASSESSMENT — COLUMBIA-SUICIDE SEVERITY RATING SCALE - C-SSRS
6. HAVE YOU EVER DONE ANYTHING, STARTED TO DO ANYTHING, OR PREPARED TO DO ANYTHING TO END YOUR LIFE?: NO
2. HAVE YOU ACTUALLY HAD ANY THOUGHTS OF KILLING YOURSELF?: NO
1. IN THE PAST MONTH, HAVE YOU WISHED YOU WERE DEAD OR WISHED YOU COULD GO TO SLEEP AND NOT WAKE UP?: NO

## 2024-01-01 NOTE — DISCHARGE INSTRUCTIONS
It appears that the skin in your leg is infected.  Stop taking the Keflex and start taking Augmentin twice a day for 7 days.  If you notice worsening, come back to the ED for further evaluation.  Otherwise, follow-up with your PCP for further evaluation.

## 2024-01-01 NOTE — ED PROVIDER NOTES
HPI   Chief Complaint   Patient presents with    Leg Pain     R/o dvt       78-year-old female with past medical history of HTN, DMT2, CKD, HLD, PVD presenting today for what she believes to be is phlebitis.  Reports a warm, erythematous patch of skin to the anterior aspect of her left shin.  Denying any fever or chills.  Reports that it has been consistently there for 4 days.  Notes that it has not gotten any better or worse since then.  Denying any lower extremity swelling.  No history of DVTs and not on any anticoagulants.  Reports no injuries to the shin.  No numbness or tingling to her lower extremities that is worse than her normal neuropathy.  Has an appointment for a angiogram on 1/5, and 4 days, for what she describes as vascular  insufficiency in her lower extremities.  No recent travel, no history of malignancy.  Denying chest pain or shortness of breath.  Notes that she saw her orthopedist a few days prior and he prescribed her Keflex for concerns of cellulitis but she notes no improvement since taking it.                          [place] Coma Scale Score: 15                  Patient History   Past Medical History:   Diagnosis Date    Contusion of unspecified part of head, initial encounter     Head contusion    Hyperlipidemia     Hypertension     Other chest pain 03/05/2018    Chest heaviness    Personal history of diseases of the skin and subcutaneous tissue 08/16/2019    History of folliculitis    Personal history of malignant neoplasm of other parts of uterus     History of malignant neoplasm of endometrium    Personal history of other diseases of the respiratory system 08/21/2020    History of sinusitis    Personal history of other diseases of the respiratory system 09/13/2020    History of acute sinusitis    Personal history of other endocrine, nutritional and metabolic disease     History of diabetes mellitus    Renal disorder     Type 2 diabetes mellitus with other diabetic neurological  complication (CMS/[facility]) 11/30/2020    Diabetes mellitus with neurological manifestations    Type 2 diabetes mellitus with other skin complications (CMS/[facility]) 03/18/2022    Diabetic foot infection    Type 2 diabetes mellitus with other skin ulcer (CODE) (CMS/[facility])     Diabetic pressure ulcer, stage 1     Past Surgical History:   Procedure Laterality Date    CHOLECYSTECTOMY  11/11/2014    Cholecystectomy    HYSTERECTOMY  11/11/2014    Hysterectomy    MR HEAD ANGIO WO IV CONTRAST  1/29/2021    MR HEAD ANGIO WO IV CONTRAST 1/29/2021 [facility]    MR HEAD ANGIO WO IV CONTRAST  11/7/2023    MR HEAD ANGIO WO IV CONTRAST 11/7/2023 [facility] MRI    OTHER SURGICAL HISTORY  06/11/2019    Breast biopsy    OTHER SURGICAL HISTORY  06/11/2019    Thyroidectomy    OTHER SURGICAL HISTORY  06/11/2019    Hip surgery    TONSILLECTOMY  11/11/2014    Tonsillectomy     Family History   Problem Relation Name Age of Onset    Other (Cardiac disorder) Mother      Diabetes Mother      Hypertension Mother      Lung disease Mother      Kidney cancer Mother      Diabetes Father      Other (Cardiac disorder) Father       Social History     Tobacco Use    Smoking status: Never    Smokeless tobacco: Never   Vaping Use    Vaping Use: Never used   Substance Use Topics    Alcohol use: Not Currently     Alcohol/week: 1.0 standard drink of alcohol     Types: 1 Standard drinks or equivalent per week    Drug use: Never       Physical Exam   ED Triage Vitals [01/01/24 1210]   Temp Heart Rate Resp BP   36.6 °C (97.9 °F) 64 18 104/50      SpO2 Temp Source Heart Rate Source Patient Position   99 % Temporal Monitor Sitting      BP Location FiO2 (%)     Right arm --       Physical Exam  Vitals and nursing note reviewed.   Constitutional:       General: She is not in acute distress.     Appearance: Normal appearance. She is not ill-appearing.   HENT:      Head: Normocephalic and atraumatic.      Right Ear: External ear normal.      Left Ear: External ear normal.       Nose: Nose normal. No congestion or rhinorrhea.      Mouth/Throat:      Mouth: Mucous membranes are moist.      Pharynx: Oropharynx is clear. No oropharyngeal exudate or posterior oropharyngeal erythema.   Eyes:      Extraocular Movements: Extraocular movements intact.      Conjunctiva/sclera: Conjunctivae normal.      Pupils: Pupils are equal, round, and reactive to light.   Cardiovascular:      Rate and Rhythm: Normal rate and regular rhythm.      Heart sounds: Normal heart sounds.   Pulmonary:      Effort: No accessory muscle usage or respiratory distress.      Breath sounds: Normal breath sounds. No wheezing, rhonchi or rales.   Abdominal:      General: Abdomen is flat. Bowel sounds are normal. There is no distension.      Palpations: Abdomen is soft.      Tenderness: There is no abdominal tenderness. There is no right CVA tenderness or left CVA tenderness.   Musculoskeletal:         General: No swelling or deformity. Normal range of motion.      Cervical back: Normal range of motion and neck supple.      Right lower leg: No edema.      Left lower leg: No edema.   Skin:     General: Skin is warm and dry.      Capillary Refill: Capillary refill takes less than 2 seconds.      Findings: Erythema present.             Comments: Warm patch Of skin with erythema noted to anterior aspect of left shin.   Neurological:      General: No focal deficit present.      Mental Status: She is alert and oriented to person, place, and time.      GCS: GCS eye subscore is 4. GCS verbal subscore is 5. GCS motor subscore is 6.      Cranial Nerves: Cranial nerves 2-12 are intact.      Sensory: No sensory deficit.      Motor: Motor function is intact. No weakness.   Psychiatric:         Mood and Affect: Mood and affect normal.         Speech: Speech normal.         Behavior: Behavior normal. Behavior is cooperative.         ED Course & MDM   ED Course as of 01/01/24 1426   Mon Jan 01, 2024   1321 Lower extremity venous duplex  left  Right Lower Venous: Right common femoral vein is negative for deep vein thrombus.  Left Lower Venous: No evidence of acute deep vein thrombus visualized in the left lower extremity. [ML]      ED Course User Index  [ML] Rachele Hernandez PA-C         Diagnoses as of 01/01/24 1426   Cellulitis of left lower extremity       Medical Decision Making  78-year-old female presenting today for erythema to the left lower extremity.  Reports that is been there for the past few days and notes that she was prescribed Keflex per her orthopedist.  Notes no improvement since taking the Keflex for the past 2 to 3 days.  Endorses no fever or chills.  Reports no worsening numbness or tingling to her lower extremity.  On exam, there is an erythematous patch to the anterior aspect of the left lower extremity across her shin.  Trace edema and warmth noted.  Appears more cellulitic to me.  Patient has concerns about a phlebitis, so ultrasound was ordered which shows no concerning signs of phlebitis or DVT.  She has no shortness of breath or chest pain to be concern for PE.  She is afebrile.  Pulses were palpated in her lower extremities 2+.  Considering it appears more cellulitic and ultrasound was negative for DVT, we will change the antibiotic to Augmentin from Keflex.  I also instructed her to follow-up with her PCP within the next few days or to see her vascular surgeon for further evaluation.  She is stable and okay for discharge at this time.        Procedure  Procedures     Rachele Hernandez PA-C  01/01/24 1422

## 2024-01-05 ENCOUNTER — OFFICE VISIT (OUTPATIENT)
Dept: WOUND CARE | Facility: CLINIC | Age: 79
End: 2024-01-05
Payer: MEDICARE

## 2024-01-05 DIAGNOSIS — L03.116 CELLULITIS OF LEFT LOWER EXTREMITY: ICD-10-CM

## 2024-01-05 PROCEDURE — 1125F AMNT PAIN NOTED PAIN PRSNT: CPT | Performed by: PODIATRIST

## 2024-01-05 PROCEDURE — 1159F MED LIST DOCD IN RCRD: CPT | Performed by: PODIATRIST

## 2024-01-05 PROCEDURE — 11042 DBRDMT SUBQ TIS 1ST 20SQCM/<: CPT | Performed by: PODIATRIST

## 2024-01-05 PROCEDURE — 11721 DEBRIDE NAIL 6 OR MORE: CPT | Mod: 59

## 2024-01-05 PROCEDURE — 1036F TOBACCO NON-USER: CPT | Performed by: PODIATRIST

## 2024-01-05 PROCEDURE — 11042 DBRDMT SUBQ TIS 1ST 20SQCM/<: CPT

## 2024-01-05 PROCEDURE — 11721 DEBRIDE NAIL 6 OR MORE: CPT | Performed by: PODIATRIST

## 2024-01-05 RX ORDER — AMOXICILLIN AND CLAVULANATE POTASSIUM 875; 125 MG/1; MG/1
1 TABLET, FILM COATED ORAL EVERY 12 HOURS
Qty: 14 TABLET | Refills: 0 | Status: SHIPPED | OUTPATIENT
Start: 2024-01-05 | End: 2024-01-12

## 2024-01-12 ENCOUNTER — APPOINTMENT (OUTPATIENT)
Dept: AUDIOLOGY | Facility: CLINIC | Age: 79
End: 2024-01-12
Payer: MEDICARE

## 2024-01-12 ENCOUNTER — OFFICE VISIT (OUTPATIENT)
Dept: WOUND CARE | Facility: CLINIC | Age: 79
End: 2024-01-12
Payer: MEDICARE

## 2024-01-12 PROCEDURE — 10140 I&D HMTMA SEROMA/FLUID COLLJ: CPT | Performed by: PODIATRIST

## 2024-01-12 PROCEDURE — 10140 I&D HMTMA SEROMA/FLUID COLLJ: CPT

## 2024-01-15 ENCOUNTER — OFFICE VISIT (OUTPATIENT)
Dept: WOUND CARE | Facility: CLINIC | Age: 79
End: 2024-01-15
Payer: MEDICARE

## 2024-01-15 PROCEDURE — 99213 OFFICE O/P EST LOW 20 MIN: CPT | Performed by: PODIATRIST

## 2024-01-15 PROCEDURE — 99212 OFFICE O/P EST SF 10 MIN: CPT | Mod: 25

## 2024-01-23 ENCOUNTER — APPOINTMENT (OUTPATIENT)
Dept: BEHAVIORAL HEALTH | Facility: CLINIC | Age: 79
End: 2024-01-23
Payer: MEDICARE

## 2024-01-26 ENCOUNTER — OFFICE VISIT (OUTPATIENT)
Dept: WOUND CARE | Facility: CLINIC | Age: 79
End: 2024-01-26
Payer: MEDICARE

## 2024-01-26 PROCEDURE — 99212 OFFICE O/P EST SF 10 MIN: CPT | Performed by: PODIATRIST

## 2024-01-26 PROCEDURE — 99212 OFFICE O/P EST SF 10 MIN: CPT | Mod: 25

## 2024-01-29 ENCOUNTER — HOSPITAL ENCOUNTER (EMERGENCY)
Facility: HOSPITAL | Age: 79
Discharge: HOME | End: 2024-01-29
Attending: EMERGENCY MEDICINE
Payer: MEDICARE

## 2024-01-29 ENCOUNTER — APPOINTMENT (OUTPATIENT)
Dept: RADIOLOGY | Facility: HOSPITAL | Age: 79
End: 2024-01-29
Payer: MEDICARE

## 2024-01-29 ENCOUNTER — APPOINTMENT (OUTPATIENT)
Dept: CARDIOLOGY | Facility: HOSPITAL | Age: 79
End: 2024-01-29
Payer: MEDICARE

## 2024-01-29 VITALS
WEIGHT: 178 LBS | RESPIRATION RATE: 16 BRPM | DIASTOLIC BLOOD PRESSURE: 62 MMHG | HEIGHT: 65 IN | OXYGEN SATURATION: 97 % | BODY MASS INDEX: 29.66 KG/M2 | TEMPERATURE: 98.4 F | HEART RATE: 68 BPM | SYSTOLIC BLOOD PRESSURE: 145 MMHG

## 2024-01-29 DIAGNOSIS — S92.355A NONDISPLACED FRACTURE OF FIFTH METATARSAL BONE, LEFT FOOT, INITIAL ENCOUNTER FOR CLOSED FRACTURE: Primary | ICD-10-CM

## 2024-01-29 LAB
ALBUMIN SERPL BCP-MCNC: 4.2 G/DL (ref 3.4–5)
ALP SERPL-CCNC: 42 U/L (ref 33–136)
ALT SERPL W P-5'-P-CCNC: 12 U/L (ref 7–45)
ANION GAP SERPL CALC-SCNC: 12 MMOL/L (ref 10–20)
AST SERPL W P-5'-P-CCNC: 13 U/L (ref 9–39)
BASOPHILS # BLD AUTO: 0.06 X10*3/UL (ref 0–0.1)
BASOPHILS NFR BLD AUTO: 0.8 %
BILIRUB SERPL-MCNC: 0.6 MG/DL (ref 0–1.2)
BUN SERPL-MCNC: 39 MG/DL (ref 6–23)
CALCIUM SERPL-MCNC: 9.3 MG/DL (ref 8.6–10.3)
CARDIAC TROPONIN I PNL SERPL HS: 5 NG/L (ref 0–13)
CARDIAC TROPONIN I PNL SERPL HS: 5 NG/L (ref 0–13)
CHLORIDE SERPL-SCNC: 100 MMOL/L (ref 98–107)
CO2 SERPL-SCNC: 25 MMOL/L (ref 21–32)
CREAT SERPL-MCNC: 2.11 MG/DL (ref 0.5–1.05)
EGFRCR SERPLBLD CKD-EPI 2021: 24 ML/MIN/1.73M*2
EOSINOPHIL # BLD AUTO: 0.19 X10*3/UL (ref 0–0.4)
EOSINOPHIL NFR BLD AUTO: 2.6 %
ERYTHROCYTE [DISTWIDTH] IN BLOOD BY AUTOMATED COUNT: 14.2 % (ref 11.5–14.5)
GLUCOSE SERPL-MCNC: 123 MG/DL (ref 74–99)
HCT VFR BLD AUTO: 34.2 % (ref 36–46)
HGB BLD-MCNC: 11.1 G/DL (ref 12–16)
IMM GRANULOCYTES # BLD AUTO: 0.05 X10*3/UL (ref 0–0.5)
IMM GRANULOCYTES NFR BLD AUTO: 0.7 % (ref 0–0.9)
LACTATE SERPL-SCNC: 1.1 MMOL/L (ref 0.4–2)
LYMPHOCYTES # BLD AUTO: 1.84 X10*3/UL (ref 0.8–3)
LYMPHOCYTES NFR BLD AUTO: 25.4 %
MAGNESIUM SERPL-MCNC: 1.56 MG/DL (ref 1.6–2.4)
MCH RBC QN AUTO: 26.7 PG (ref 26–34)
MCHC RBC AUTO-ENTMCNC: 32.5 G/DL (ref 32–36)
MCV RBC AUTO: 82 FL (ref 80–100)
MONOCYTES # BLD AUTO: 0.49 X10*3/UL (ref 0.05–0.8)
MONOCYTES NFR BLD AUTO: 6.8 %
NEUTROPHILS # BLD AUTO: 4.6 X10*3/UL (ref 1.6–5.5)
NEUTROPHILS NFR BLD AUTO: 63.7 %
NRBC BLD-RTO: 0 /100 WBCS (ref 0–0)
PLATELET # BLD AUTO: 168 X10*3/UL (ref 150–450)
POTASSIUM SERPL-SCNC: 4.3 MMOL/L (ref 3.5–5.3)
PROT SERPL-MCNC: 6.9 G/DL (ref 6.4–8.2)
RBC # BLD AUTO: 4.16 X10*6/UL (ref 4–5.2)
SODIUM SERPL-SCNC: 133 MMOL/L (ref 136–145)
WBC # BLD AUTO: 7.2 X10*3/UL (ref 4.4–11.3)

## 2024-01-29 PROCEDURE — 96365 THER/PROPH/DIAG IV INF INIT: CPT

## 2024-01-29 PROCEDURE — 36415 COLL VENOUS BLD VENIPUNCTURE: CPT | Performed by: EMERGENCY MEDICINE

## 2024-01-29 PROCEDURE — 71045 X-RAY EXAM CHEST 1 VIEW: CPT

## 2024-01-29 PROCEDURE — 99285 EMERGENCY DEPT VISIT HI MDM: CPT | Performed by: EMERGENCY MEDICINE

## 2024-01-29 PROCEDURE — 71045 X-RAY EXAM CHEST 1 VIEW: CPT | Performed by: RADIOLOGY

## 2024-01-29 PROCEDURE — 85025 COMPLETE CBC W/AUTO DIFF WBC: CPT | Performed by: EMERGENCY MEDICINE

## 2024-01-29 PROCEDURE — 73630 X-RAY EXAM OF FOOT: CPT | Mod: RT

## 2024-01-29 PROCEDURE — 2500000004 HC RX 250 GENERAL PHARMACY W/ HCPCS (ALT 636 FOR OP/ED): Performed by: EMERGENCY MEDICINE

## 2024-01-29 PROCEDURE — 83735 ASSAY OF MAGNESIUM: CPT | Performed by: EMERGENCY MEDICINE

## 2024-01-29 PROCEDURE — 99284 EMERGENCY DEPT VISIT MOD MDM: CPT | Performed by: EMERGENCY MEDICINE

## 2024-01-29 PROCEDURE — 73630 X-RAY EXAM OF FOOT: CPT | Mod: RIGHT SIDE | Performed by: RADIOLOGY

## 2024-01-29 PROCEDURE — 84075 ASSAY ALKALINE PHOSPHATASE: CPT | Performed by: EMERGENCY MEDICINE

## 2024-01-29 PROCEDURE — 99284 EMERGENCY DEPT VISIT MOD MDM: CPT | Mod: 25

## 2024-01-29 PROCEDURE — 93005 ELECTROCARDIOGRAM TRACING: CPT

## 2024-01-29 PROCEDURE — 83605 ASSAY OF LACTIC ACID: CPT | Performed by: EMERGENCY MEDICINE

## 2024-01-29 PROCEDURE — 96366 THER/PROPH/DIAG IV INF ADDON: CPT

## 2024-01-29 PROCEDURE — 84484 ASSAY OF TROPONIN QUANT: CPT | Performed by: EMERGENCY MEDICINE

## 2024-01-29 PROCEDURE — 93010 ELECTROCARDIOGRAM REPORT: CPT | Performed by: EMERGENCY MEDICINE

## 2024-01-29 PROCEDURE — 29515 APPLICATION SHORT LEG SPLINT: CPT | Mod: RT

## 2024-01-29 RX ORDER — MAGNESIUM SULFATE HEPTAHYDRATE 40 MG/ML
2 INJECTION, SOLUTION INTRAVENOUS ONCE
Status: COMPLETED | OUTPATIENT
Start: 2024-01-29 | End: 2024-01-29

## 2024-01-29 RX ADMIN — SODIUM CHLORIDE 1000 ML: 9 INJECTION, SOLUTION INTRAVENOUS at 13:01

## 2024-01-29 RX ADMIN — MAGNESIUM SULFATE HEPTAHYDRATE 2 G: 40 INJECTION, SOLUTION INTRAVENOUS at 14:19

## 2024-01-29 ASSESSMENT — LIFESTYLE VARIABLES
HAVE PEOPLE ANNOYED YOU BY CRITICIZING YOUR DRINKING: NO
EVER FELT BAD OR GUILTY ABOUT YOUR DRINKING: NO
HAVE YOU EVER FELT YOU SHOULD CUT DOWN ON YOUR DRINKING: NO
EVER HAD A DRINK FIRST THING IN THE MORNING TO STEADY YOUR NERVES TO GET RID OF A HANGOVER: NO
REASON UNABLE TO ASSESS: NO

## 2024-01-29 ASSESSMENT — PAIN SCALES - GENERAL: PAINLEVEL_OUTOF10: 8

## 2024-01-29 ASSESSMENT — PAIN DESCRIPTION - ORIENTATION: ORIENTATION: RIGHT

## 2024-01-29 ASSESSMENT — PAIN - FUNCTIONAL ASSESSMENT: PAIN_FUNCTIONAL_ASSESSMENT: 0-10

## 2024-01-29 ASSESSMENT — PAIN DESCRIPTION - LOCATION: LOCATION: FOOT

## 2024-01-29 NOTE — DISCHARGE INSTRUCTIONS
Follow up your blood pressure issues with your doctor.    Seek immediate medical attention if you develop: increasing pain, numbness, tingling, weakness, loss of motion in your toes, your foot becomes pale or cold, or you develop any new or worsening symptoms.

## 2024-01-29 NOTE — ED PROVIDER NOTES
HPI   Chief Complaint   Patient presents with    Foot Injury     R foot; stepped wrong when standing up       78-year-old female presenting to the emergency department for evaluation of right foot pain.  Incidentally noted by nursing triage to have a low blood pressure and brought back for resuscitation.  Patient endorses that she had mild orthostatic hypotension in the past and I orthostatic lightheadedness today.  Denies any chest pain, shortness of breath, fevers, chills, sweats, cough.  Denies any abdominal pain, vomiting or diarrhea.  She does state that she was watching her grandchildren over the weekend has not been drinking much fluid as she was otherwise distracted by them and taking care of their needs.  She does endorse that she is a long history of abnormal blood pressure secondary to her blood pressure medication and they have been actively titrating her antihypertensives.  She does endorse prior fracture of the right foot which is where she is concerned about.  States that was previously repaired by podiatry using bone glue with no orthopedic hardware placed.  She states that the actual injury occurred earlier on Saturday morning with a simple stepping maneuver with no rolling of the ankle or other significant trauma.  She not fall.                          Nadine Coma Scale Score: 15                  Patient History   Past Medical History:   Diagnosis Date    Contusion of unspecified part of head, initial encounter     Head contusion    Hyperlipidemia     Hypertension     Other chest pain 03/05/2018    Chest heaviness    Personal history of diseases of the skin and subcutaneous tissue 08/16/2019    History of folliculitis    Personal history of malignant neoplasm of other parts of uterus     History of malignant neoplasm of endometrium    Personal history of other diseases of the respiratory system 08/21/2020    History of sinusitis    Personal history of other diseases of the respiratory system  09/13/2020    History of acute sinusitis    Personal history of other endocrine, nutritional and metabolic disease     History of diabetes mellitus    Renal disorder     Type 2 diabetes mellitus with other diabetic neurological complication (CMS/HCC) 11/30/2020    Diabetes mellitus with neurological manifestations    Type 2 diabetes mellitus with other skin complications (CMS/[facility]) 03/18/2022    Diabetic foot infection    Type 2 diabetes mellitus with other skin ulcer (CODE) (CMS/[facility])     Diabetic pressure ulcer, stage 1     Past Surgical History:   Procedure Laterality Date    CHOLECYSTECTOMY  11/11/2014    Cholecystectomy    HYSTERECTOMY  11/11/2014    Hysterectomy    MR HEAD ANGIO WO IV CONTRAST  1/29/2021    MR HEAD ANGIO WO IV CONTRAST 1/29/2021 [facility]    MR HEAD ANGIO WO IV CONTRAST  11/7/2023    MR HEAD ANGIO WO IV CONTRAST 11/7/2023 [facility] MRI    OTHER SURGICAL HISTORY  06/11/2019    Breast biopsy    OTHER SURGICAL HISTORY  06/11/2019    Thyroidectomy    OTHER SURGICAL HISTORY  06/11/2019    Hip surgery    TONSILLECTOMY  11/11/2014    Tonsillectomy     Family History   Problem Relation Name Age of Onset    Other (Cardiac disorder) Mother      Diabetes Mother      Hypertension Mother      Lung disease Mother      Kidney cancer Mother      Diabetes Father      Other (Cardiac disorder) Father       Social History     Tobacco Use    Smoking status: Never    Smokeless tobacco: Never   Vaping Use    Vaping Use: Never used   Substance Use Topics    Alcohol use: Not Currently     Alcohol/week: 1.0 standard drink of alcohol     Types: 1 Standard drinks or equivalent per week    Drug use: Never       Physical Exam   ED Triage Vitals [01/29/24 1211]   Temperature Heart Rate Respirations BP   36.9 °C (98.4 °F) 74 16 (!) 76/42      Pulse Ox Temp Source Heart Rate Source Patient Position   96 % Temporal -- --      BP Location FiO2 (%)     -- --       Physical Exam  Vitals and nursing note reviewed.    Constitutional:       General: She is not in acute distress.     Appearance: She is well-developed. She is not ill-appearing.   HENT:      Head: Normocephalic and atraumatic.      Nose: No congestion or rhinorrhea.      Mouth/Throat:      Mouth: Mucous membranes are moist.      Pharynx: No oropharyngeal exudate or posterior oropharyngeal erythema.   Eyes:      Conjunctiva/sclera: Conjunctivae normal.   Cardiovascular:      Rate and Rhythm: Normal rate and regular rhythm.      Pulses: Normal pulses.      Heart sounds: No murmur heard.     No gallop.   Pulmonary:      Effort: Pulmonary effort is normal. No respiratory distress.      Breath sounds: Normal breath sounds. No stridor. No wheezing, rhonchi or rales.   Abdominal:      General: Bowel sounds are normal. There is no distension.      Palpations: Abdomen is soft.      Tenderness: There is no abdominal tenderness. There is no guarding or rebound.   Musculoskeletal:         General: No swelling.      Cervical back: Neck supple.      Comments: Mild bony tenderness over the fifth metatarsal head.  Otherwise able to flex extend the ankle with no bony tenderness or over the fibular head or knee.  No joint effusions.  2+ dorsalis pedis and posterior tibial pulse.  All compartments are soft.  Achilles and quadricep tendon intact.   Skin:     General: Skin is warm and dry.      Capillary Refill: Capillary refill takes less than 2 seconds.      Findings: No rash.   Neurological:      General: No focal deficit present.      Mental Status: She is alert and oriented to person, place, and time.      Cranial Nerves: No cranial nerve deficit.      Sensory: No sensory deficit.      Gait: Gait normal.   Psychiatric:         Mood and Affect: Mood normal.         Behavior: Behavior normal.         Thought Content: Thought content normal.         ED Course & MDM   ED Course as of 01/29/24 1559   Mon Jan 29, 2024   1311 BP improved at this time to 107/47 on repeat after IV fluid  started.  Will continue to assess. [TL]   1340 Lactate within normal limits and blood pressure remains normal at this time on reassessment. [TL]   1340 No leukocytosis or anemia.  Mild hypomagnesemia which will be repleted IV.   [TL]   1341 Chronic kidney disease noted to be at baseline. [TL]   1355 Chest Radiograph interpretation: No acute cardiopulmonary process.  No pneumothorax, widened mediastinum, pneumonia. [TL]   1558 Patient placed in posterior short leg splint given Gottlieb fracture made nonweightbearing with crutches provided and podiatry follow-up.  She was made aware of the nonweightbearing status.  Patient remained with normal blood pressure for several hours in the emergency department without recurrence of the hypotension.  No sign of acute infection.  Troponin and basic laboratory studies were all reassuring.  Patient reports that she has very unstable autonomic send has history of this secondary to her blood pressure medication which is actively being titrated.  Advised her to carefully monitor blood pressure and to discuss her blood pressure regimen at home.  She also has significantly decreased p.o. intake and her blood pressure did immediately improve with fluids and did not rebound back to a low level which I believe there was a component of hypovolemia to this.  Advised her to increase her p.o. intake at home.  To be discharged at this time.  Return precautions explained. [TL]      ED Course User Index  [TL] Tello Mcqueen DO         Diagnoses as of 01/29/24 1559   Nondisplaced fracture of fifth metatarsal bone, left foot, initial encounter for closed fracture       Medical Decision Making  Overall well-appearing 78-year-old female listed right low blood pressure in triage.  IV fluids were given after IV access was established and patient blood pressure immediately responded.  She had no sign of malperfusion including no sign of decreased cap refill.  Suspect likely hypovolemia in the setting  "of decreased p.o. intake as well as potential complication from her blood pressure medication at home.  She is fairly adamant that she did not take any extra doses that she lays them out very meticulously.  She was monitored for several hours the emergency room with no sign of rebound hypotension and there is no sign of shock state or sepsis.  EKG obtained and serial troponins negative.  Chest x-ray reveals no sign of pneumonia which she had does have a history of.  X-ray did reveal sign of Gottlieb fracture patient was placed in posterior short leg splint with crutches and orthopedic follow-up.    =================Attending note===============    The patient was seen by the resident/fellow.  I have personally performed a substantive portion of the encounter.  I have seen and examined the patient; agree with the workup, evaluation, MDM,   management and diagnosis.  The care plan has been discussed with the resident; I have reviewed the resident's note and agree with the documented findings.      This is a 78 y.o. female who presents to ER with right foot pain over the proximal fifth metatarsal.  She states that on Saturday night going into Sunday morning around midnight she went to get into bed and she stepped she felt and heard a popping sensation.  She states initially she thought she stepped on something and then she looked down and there was nothing there.  She then developed some pain in the foot.  She does have a prior fifth metatarsal fracture that Dr. Mueller repaired with \"glue\"  years ago.  Incidentally the patient also has low blood pressure.  From what she describes, she has very labile blood pressure.  She states that she has had issues with low blood pressure in the past and when they tried to decrease her blood pressure meds she has not been hypertensive.  This is not a new issue for her.  She is an upcoming appointment with her doctor next week to readdress her blood pressure meds.  She states that " she did not drink much fluids in the last day or 2.  There is no chest pain or shortness of breath today.  She had some occasional lightheadedness.  She states this is typically when she changes positions.    Heart is regular.  Lungs are clear.  Abdomen is soft and nontender.  Moving extremities x 4 without difficulty.  There is tenderness over her proximal fifth metatarsal on the right.  There are some mild bruising discoloration and some very mild redness in this area.  Pedal pulses intact.  Cap refill brisk.  Sensation intact.    Troponin negative.  Sodium is slightly low.  There is some renal insufficiency.  This is at baseline.  Magnesium slightly low.  Very mild anemia with a hemoglobin of 11.  White count is normal.  Chest x-ray had no acute findings.    EKG: Normal sinus rhythm with a rate of 69.  .  QTc 445.  No ST elevations.  Repeat EKG: Normal sinus rhythm with a ventricular rate of 66.  .  QTc 448.  No ST elevations.    Blood pressure normalized and remained normalized after IV fluids.    I did review the x-ray and there does appear to be a old healed fracture of the proximal fifth metatarsal.  This was seen on previous x-ray.  We are waiting radiology interpretation.  Radiology felt there was an acute and chronic fracture there.  Therefore patient is treated in a short leg splint.  She has walker at home.  She is to follow-up with orthopedics and primary care.  She is to return to the nearest ER for any new or worsening symptoms.    ==========================================          Procedure  Procedures     Tello Mcqueen, DO  01/29/24 1602       Giorgio Adan, DO  01/30/24 0751       Giorgio Adan, DO  01/30/24 0752

## 2024-01-30 ENCOUNTER — OFFICE VISIT (OUTPATIENT)
Dept: PODIATRY | Facility: CLINIC | Age: 79
End: 2024-01-30
Payer: MEDICARE

## 2024-01-30 DIAGNOSIS — S92.901D: ICD-10-CM

## 2024-01-30 DIAGNOSIS — E78.1 TYPE 2 DIABETES MELLITUS WITH HYPERTRIGLYCERIDEMIA (MULTI): Primary | ICD-10-CM

## 2024-01-30 DIAGNOSIS — E11.69 TYPE 2 DIABETES MELLITUS WITH HYPERTRIGLYCERIDEMIA (MULTI): Primary | ICD-10-CM

## 2024-01-30 DIAGNOSIS — S92.351D FRACTURE OF BASE OF FIFTH METATARSAL BONE OF RIGHT FOOT WITH ROUTINE HEALING, SUBSEQUENT ENCOUNTER: ICD-10-CM

## 2024-01-30 PROCEDURE — 1125F AMNT PAIN NOTED PAIN PRSNT: CPT | Performed by: PODIATRIST

## 2024-01-30 PROCEDURE — 1160F RVW MEDS BY RX/DR IN RCRD: CPT | Performed by: PODIATRIST

## 2024-01-30 PROCEDURE — 1159F MED LIST DOCD IN RCRD: CPT | Performed by: PODIATRIST

## 2024-01-30 PROCEDURE — 99213 OFFICE O/P EST LOW 20 MIN: CPT | Performed by: PODIATRIST

## 2024-01-30 PROCEDURE — 1123F ACP DISCUSS/DSCN MKR DOCD: CPT | Performed by: PODIATRIST

## 2024-01-30 PROCEDURE — 1036F TOBACCO NON-USER: CPT | Performed by: PODIATRIST

## 2024-01-30 NOTE — PROGRESS NOTES
History Of Present Illness  Elma Hernandez is a 78 y.o. female presenting with chief complaint of:  Right foot fracture.   Patient called yesterday to say that she had fallen.  I was working at our wound center.  Referred the patient to the emergency department.  She got an x-ray and fracture of the fifth metatarsal on the right foot was confirmed.  Patient was placed in a posterior splint.     Past Medical History  She has a past medical history of Contusion of unspecified part of head, initial encounter, Hyperlipidemia, Hypertension, Other chest pain (03/05/2018), Personal history of diseases of the skin and subcutaneous tissue (08/16/2019), Personal history of malignant neoplasm of other parts of uterus, Personal history of other diseases of the respiratory system (08/21/2020), Personal history of other diseases of the respiratory system (09/13/2020), Personal history of other endocrine, nutritional and metabolic disease, Renal disorder, Type 2 diabetes mellitus with other diabetic neurological complication (CMS/[facility]) (11/30/2020), Type 2 diabetes mellitus with other skin complications (CMS/[facility]) (03/18/2022), and Type 2 diabetes mellitus with other skin ulcer (CODE) (CMS/[facility]).    Surgical History  She has a past surgical history that includes Tonsillectomy (11/11/2014); Cholecystectomy (11/11/2014); Hysterectomy (11/11/2014); Other surgical history (06/11/2019); Other surgical history (06/11/2019); Other surgical history (06/11/2019); MR angio head wo IV contrast (1/29/2021); and MR angio head wo IV contrast (11/7/2023).     Social History  She reports that she has never smoked. She has never used smokeless tobacco. She reports that she does not currently use alcohol after a past usage of about 1.0 standard drink of alcohol per week. She reports that she does not use drugs.    Family History  Family History   Problem Relation Name Age of Onset    Other (Cardiac disorder) Mother      Diabetes Mother       Hypertension Mother      Lung disease Mother      Kidney cancer Mother      Diabetes Father      Other (Cardiac disorder) Father          Allergies  Latex and Sulfamethoxazole-trimethoprim    Medications  Current Outpatient Medications   Medication Sig Dispense Refill    albuterol (ProAir RespiClick) 90 mcg/actuation aerosol powdr breath activated inhaler INHALE 2 PUFFS Every 4 hours PRN 0.65 g 11    amLODIPine (Norvasc) 5 mg tablet Take 1 tablet (5 mg) by mouth once daily. 30 tablet 11    atorvastatin (Lipitor) 40 mg tablet Take 1 tablet (40 mg) by mouth once daily. 90 tablet 3    blood sugar diagnostic (Accu-Chek Ronda Plus test strp) strip TEST 3 TIMES DAILY.      carvedilol (Coreg) 12.5 mg tablet Take 1 tablet (12.5 mg) by mouth 2 times a day. 180 tablet 3    cetirizine (ZyrTEC) 10 mg tablet Take 1 tablet (10 mg) by mouth once daily. 90 tablet 3    fenofibrate (Triglide) 160 mg tablet Take 1 tablet (160 mg) by mouth once daily. 90 tablet 3    fluticasone (Flonase) 50 mcg/actuation nasal spray Administer 2 sprays into each nostril 2 times a day.      gabapentin (Neurontin) 100 mg capsule Take 1 capsule (100 mg) by mouth 2 times a day. (Patient taking differently: Take 1 capsule (100 mg) by mouth once daily.) 180 capsule 3    insulin glargine (Lantus) 100 unit/mL (3 mL) pen 40 Units every night 18 mL 3    insulin lispro (HumaLOG) 100 unit/mL injection 12-12-12-0 +scale TID 2 units for every 50 over 200 18 mL 3    latanoprost (Xalatan) 0.005 % ophthalmic solution Latanoprost 0.005 % Ophthalmic Solution  Quantity: 2   Refills: 0  Start: 27-May-2021      magnesium oxide,aspartate,citr (Triple Magnesium Complex) 400 mg magnesium capsule TAKE 3 CAPSULE Daily      mecobalamin, vitamin B12, 1,000 mcg tablet,disintegrating Place 1 tablet under the tongue once daily. 90 tablet 3    nortriptyline (Pamelor) 10 mg capsule Take 3 capsules (30 mg) by mouth once daily at bedtime. 90 capsule 5    omeprazole (PriLOSEC) 40 mg   capsule Take 1 capsule (40 mg) by mouth once daily. Do not crush or chew. 90 capsule 3    polyethylene glycol-electrolytes 420 gram solution TAKE AS DIRECTED.      sertraline (Zoloft) 100 mg tablet Take 2 tablets (200 mg) by mouth once daily. 60 tablet 5    hydrOXYzine HCL (Atarax) 25 mg tablet Take 1 tablet (25 mg) by mouth 3 times a day as needed for anxiety. (Patient not taking: Reported on 11/21/2023) 90 tablet 0    LORazepam (Ativan) 0.5 mg tablet Take 1 tablet (0.5 mg) by mouth once daily as needed for anxiety. 30 tablet 0     No current facility-administered medications for this visit.       Review of Systems    REVIEW OF SYSTEMS  GENERAL:  Negative for malaise, significant weight loss, fever  CARDIOVASCULAR: leg swelling   MUSCULOSKELETAL:  Negative for joint pain or swelling, back pain, and muscle pain.  SKIN:  Negative for lesions, rash, and itching  PSYCH:  Negative for sleep disturbance, mood disorder and recent psychosocial stressors  NEURO: Negative, denies any burning, tingling or numbness     Objective: Patient has very poor balance  Vasc: DP and PT pulses are weakly palpable bilateral.  CFT is less than 3 seconds bilateral.  Skin temperature is warm to cool proximal to distal bilateral.      Neuro:  Light touch is  decreased derm:   Nails are normal. Skin is supple with normal texture and turgor noted.  Webspaces are clean, dry and intact bilateral.  There are no hyperkeratoses, ulcerations, verruca or other lesions noted.  Ulcer subfifth metatarsal head    Ortho: Edema and erythema about the fifth metatarsal base right foot.  Patient has pain.  X-rays are personally reviewed and do show fracture of the fifth metatarsal base.  Assessment/Plan     Diagnoses and all orders for this visit:  Type 2 diabetes mellitus with hypertriglyceridemia (CMS/HCC)  Fracture of right foot, with routine healing, subsequent encounter  -     Walking Boot Short  Fracture of base of fifth metatarsal bone of right foot  with routine healing, subsequent encounter    Patient is given a removable cam walker.  She can remove this for showering only.  She is to remain nonweightbearing is much as well.  She will see me in 1 month's time remove the           Leisa Hinton CMA

## 2024-01-31 ENCOUNTER — TELEPHONE (OUTPATIENT)
Dept: PEDIATRICS | Facility: CLINIC | Age: 79
End: 2024-01-31
Payer: MEDICARE

## 2024-01-31 DIAGNOSIS — I10 ESSENTIAL HYPERTENSION: ICD-10-CM

## 2024-01-31 LAB
ATRIAL RATE: 66 BPM
ATRIAL RATE: 69 BPM
P AXIS: 78 DEGREES
P OFFSET: 182 MS
P OFFSET: 198 MS
P ONSET: 132 MS
P ONSET: 137 MS
PR INTERVAL: 156 MS
PR INTERVAL: 178 MS
Q ONSET: 210 MS
Q ONSET: 226 MS
QRS COUNT: 11 BEATS
QRS COUNT: 11 BEATS
QRS DURATION: 78 MS
QRS DURATION: 82 MS
QT INTERVAL: 416 MS
QT INTERVAL: 428 MS
QTC CALCULATION(BAZETT): 445 MS
QTC CALCULATION(BAZETT): 448 MS
QTC FREDERICIA: 436 MS
QTC FREDERICIA: 442 MS
R AXIS: -37 DEGREES
R AXIS: -40 DEGREES
T AXIS: 42 DEGREES
T AXIS: 5 DEGREES
T OFFSET: 418 MS
T OFFSET: 440 MS
VENTRICULAR RATE: 66 BPM
VENTRICULAR RATE: 69 BPM

## 2024-01-31 RX ORDER — AMLODIPINE BESYLATE 5 MG/1
5 TABLET ORAL DAILY
Qty: 90 TABLET | Refills: 3 | Status: SHIPPED | OUTPATIENT
Start: 2024-01-31 | End: 2025-01-30

## 2024-02-09 ENCOUNTER — OFFICE VISIT (OUTPATIENT)
Dept: PRIMARY CARE | Facility: CLINIC | Age: 79
End: 2024-02-09
Payer: MEDICARE

## 2024-02-09 VITALS
WEIGHT: 178 LBS | TEMPERATURE: 97.8 F | DIASTOLIC BLOOD PRESSURE: 58 MMHG | OXYGEN SATURATION: 96 % | HEIGHT: 65 IN | HEART RATE: 64 BPM | BODY MASS INDEX: 29.66 KG/M2 | RESPIRATION RATE: 16 BRPM | SYSTOLIC BLOOD PRESSURE: 110 MMHG

## 2024-02-09 DIAGNOSIS — I12.0 HYPERTENSIVE CHRONIC KIDNEY DISEASE WITH STAGE 5 CHRONIC KIDNEY DISEASE OR END STAGE RENAL DISEASE (MULTI): ICD-10-CM

## 2024-02-09 DIAGNOSIS — M25.50 ARTHRALGIA, UNSPECIFIED JOINT: ICD-10-CM

## 2024-02-09 DIAGNOSIS — I73.9 PAD (PERIPHERAL ARTERY DISEASE) (CMS-HCC): ICD-10-CM

## 2024-02-09 DIAGNOSIS — N18.30 CKD STAGE 3 SECONDARY TO DIABETES (MULTI): ICD-10-CM

## 2024-02-09 DIAGNOSIS — E11.3512 PROLIFERATIVE DIABETIC RETINOPATHY OF LEFT EYE WITH MACULAR EDEMA ASSOCIATED WITH TYPE 2 DIABETES MELLITUS (MULTI): ICD-10-CM

## 2024-02-09 DIAGNOSIS — E78.1 TYPE 2 DIABETES MELLITUS WITH HYPERTRIGLYCERIDEMIA (MULTI): ICD-10-CM

## 2024-02-09 DIAGNOSIS — Z00.00 PERIODIC HEALTH ASSESSMENT, GENERAL SCREENING, ADULT: Primary | ICD-10-CM

## 2024-02-09 DIAGNOSIS — M14.672 CHARCOT'S JOINT OF LEFT FOOT: ICD-10-CM

## 2024-02-09 DIAGNOSIS — Z00.00 WELLNESS EXAMINATION: ICD-10-CM

## 2024-02-09 DIAGNOSIS — Z79.4 TYPE 2 DIABETES MELLITUS WITH BOTH EYES AFFECTED BY MODERATE NONPROLIFERATIVE RETINOPATHY AND MACULAR EDEMA, WITH LONG-TERM CURRENT USE OF INSULIN (MULTI): ICD-10-CM

## 2024-02-09 DIAGNOSIS — F39 MOOD DISORDER (CMS-HCC): ICD-10-CM

## 2024-02-09 DIAGNOSIS — E11.22 CKD STAGE 3 SECONDARY TO DIABETES (MULTI): ICD-10-CM

## 2024-02-09 DIAGNOSIS — E07.9 THYROID DISEASE: ICD-10-CM

## 2024-02-09 DIAGNOSIS — E11.69 TYPE 2 DIABETES MELLITUS WITH HYPERTRIGLYCERIDEMIA (MULTI): ICD-10-CM

## 2024-02-09 DIAGNOSIS — E11.3313 TYPE 2 DIABETES MELLITUS WITH BOTH EYES AFFECTED BY MODERATE NONPROLIFERATIVE RETINOPATHY AND MACULAR EDEMA, WITH LONG-TERM CURRENT USE OF INSULIN (MULTI): ICD-10-CM

## 2024-02-09 PROBLEM — M87.00 AVASCULAR NECROSIS OF BONE (MULTI): Status: RESOLVED | Noted: 2023-03-31 | Resolved: 2024-02-09

## 2024-02-09 LAB — POC HEMOGLOBIN A1C: 6.7 % (ref 4.2–6.5)

## 2024-02-09 PROCEDURE — 1126F AMNT PAIN NOTED NONE PRSNT: CPT | Performed by: INTERNAL MEDICINE

## 2024-02-09 PROCEDURE — 1160F RVW MEDS BY RX/DR IN RCRD: CPT | Performed by: INTERNAL MEDICINE

## 2024-02-09 PROCEDURE — 3078F DIAST BP <80 MM HG: CPT | Performed by: INTERNAL MEDICINE

## 2024-02-09 PROCEDURE — 1159F MED LIST DOCD IN RCRD: CPT | Performed by: INTERNAL MEDICINE

## 2024-02-09 PROCEDURE — G0439 PPPS, SUBSEQ VISIT: HCPCS | Performed by: INTERNAL MEDICINE

## 2024-02-09 PROCEDURE — 1036F TOBACCO NON-USER: CPT | Performed by: INTERNAL MEDICINE

## 2024-02-09 PROCEDURE — 1123F ACP DISCUSS/DSCN MKR DOCD: CPT | Performed by: INTERNAL MEDICINE

## 2024-02-09 PROCEDURE — 3074F SYST BP LT 130 MM HG: CPT | Performed by: INTERNAL MEDICINE

## 2024-02-09 PROCEDURE — 83036 HEMOGLOBIN GLYCOSYLATED A1C: CPT | Performed by: INTERNAL MEDICINE

## 2024-02-09 PROCEDURE — 99397 PER PM REEVAL EST PAT 65+ YR: CPT | Performed by: INTERNAL MEDICINE

## 2024-02-09 ASSESSMENT — ENCOUNTER SYMPTOMS
LOSS OF SENSATION IN FEET: 0
HYPERTENSION: 1
OCCASIONAL FEELINGS OF UNSTEADINESS: 0
DEPRESSION: 0
DIABETIC ASSOCIATED SYMPTOMS: 0

## 2024-02-09 ASSESSMENT — PAIN SCALES - GENERAL: PAINLEVEL: 0-NO PAIN

## 2024-02-09 NOTE — PROGRESS NOTES
"Subjective   Patient ID: Elma Hernandez is a 78 y.o. female who presents for APE and AWV as well as Follow-up, Diabetes, and Hypertension.    Overall doing well.  Patient is fairly active.  Denies any issues with CP or SOB.  Occasional dizzy spells, but improved from previous.  Chronic issues with anxiety, but this too has been better as of late..  Denies any issues with HA's.  Some chronic numbness/tinginling in feet.  No progression.  No issues or changes with bowel or bladder habits.      Last A1C was 6.0% on 11.9.2023  Todays A1C was 6.7%.  no hypoglycemia.    Reviewed labs from hospital, was in hospital due to breaking her foot     Patient states that her glucose can go anywhere from over 200 to under 60, 200 readings are when she forgets to take her insulin   Left foot has sores on it, she thinks they are pressure sore, just noticed them yesterday   Left lower leg had cellulitis removed and took antibiotics and she would like it looked at     Diabetes  She presents for her follow-up diabetic visit. She has type 2 diabetes mellitus. Her disease course has been stable. There are no diabetic associated symptoms. When asked about meal planning, she reported none. She has not had a previous visit with a dietitian. She never participates in exercise. She does not see a podiatrist.Eye exam is current.   Hypertension  This is a recurrent problem. The current episode started more than 1 year ago. The problem is unchanged. The problem is controlled. The current treatment provides moderate improvement. There are no compliance problems.         Review of Systems   All other systems reviewed and are negative.      Objective   /58   Pulse 64   Temp 36.6 °C (97.8 °F)   Resp 16   Ht 1.651 m (5' 5\")   Wt 80.7 kg (178 lb)   SpO2 96%   BMI 29.62 kg/m²     Physical Exam  Vitals reviewed.   Constitutional:       Appearance: Normal appearance.   HENT:      Head: Normocephalic.   Cardiovascular:      Rate and Rhythm: " Normal rate and regular rhythm.   Pulmonary:      Effort: Pulmonary effort is normal.      Breath sounds: Normal breath sounds.   Musculoskeletal:         General: Normal range of motion.   Neurological:      General: No focal deficit present.      Mental Status: She is alert.   Psychiatric:         Mood and Affect: Mood normal.       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  We reviewed and discussed all the above.  We discussed current medications as well as most recent test results.  We discussed the importance and benefits of a healthy diet that is both low in sugars and low in saturated fats.  We reviewed and discussed the benefits of regular physical exercise especially when at or above a level of 150 minutes/week.  We also discussed the importance of stress management and good sleep hygiene.  We discussed labs results/CKD and the benefits of good hydration and avoidance of NSAIDs  (ibuprofen, motrin, aleve, naproxen, etc).  Tylenol (acetaminophen is ok for aches and pain).   She needs to continue to follow with her specialist.    We will continue to monitor her ongoing metabolic disease and risks.    We will continue to work on lifestyle improvements and follow-up in 3 months, sooner if any issues should arise.

## 2024-02-29 ENCOUNTER — OFFICE VISIT (OUTPATIENT)
Dept: PODIATRY | Facility: CLINIC | Age: 79
End: 2024-02-29
Payer: MEDICARE

## 2024-02-29 ENCOUNTER — HOSPITAL ENCOUNTER (OUTPATIENT)
Dept: RADIOLOGY | Facility: CLINIC | Age: 79
Discharge: HOME | End: 2024-02-29
Payer: MEDICARE

## 2024-02-29 DIAGNOSIS — M79.675 PAIN IN TOES OF BOTH FEET: ICD-10-CM

## 2024-02-29 DIAGNOSIS — B35.1 ONYCHOMYCOSIS: ICD-10-CM

## 2024-02-29 DIAGNOSIS — E10.43 DIABETIC AUTONOMIC NEUROPATHY ASSOCIATED WITH TYPE 1 DIABETES MELLITUS (MULTI): ICD-10-CM

## 2024-02-29 DIAGNOSIS — S92.351D FRACTURE OF BASE OF FIFTH METATARSAL BONE OF RIGHT FOOT WITH ROUTINE HEALING, SUBSEQUENT ENCOUNTER: ICD-10-CM

## 2024-02-29 DIAGNOSIS — S92.351D FRACTURE OF BASE OF FIFTH METATARSAL BONE OF RIGHT FOOT WITH ROUTINE HEALING, SUBSEQUENT ENCOUNTER: Primary | ICD-10-CM

## 2024-02-29 DIAGNOSIS — L97.522 FOOT ULCER WITH FAT LAYER EXPOSED, LEFT (MULTI): ICD-10-CM

## 2024-02-29 DIAGNOSIS — M79.674 PAIN IN TOES OF BOTH FEET: ICD-10-CM

## 2024-02-29 PROCEDURE — 99213 OFFICE O/P EST LOW 20 MIN: CPT | Performed by: PODIATRIST

## 2024-02-29 PROCEDURE — 1159F MED LIST DOCD IN RCRD: CPT | Performed by: PODIATRIST

## 2024-02-29 PROCEDURE — 1160F RVW MEDS BY RX/DR IN RCRD: CPT | Performed by: PODIATRIST

## 2024-02-29 PROCEDURE — 73630 X-RAY EXAM OF FOOT: CPT | Mod: RT

## 2024-02-29 PROCEDURE — 1126F AMNT PAIN NOTED NONE PRSNT: CPT | Performed by: PODIATRIST

## 2024-02-29 PROCEDURE — 73630 X-RAY EXAM OF FOOT: CPT | Mod: RIGHT SIDE | Performed by: RADIOLOGY

## 2024-02-29 PROCEDURE — 1036F TOBACCO NON-USER: CPT | Performed by: PODIATRIST

## 2024-02-29 PROCEDURE — 1123F ACP DISCUSS/DSCN MKR DOCD: CPT | Performed by: PODIATRIST

## 2024-02-29 NOTE — PROGRESS NOTES
History Of Present Illness  Elma Hernandez is a 78 y.o. female presenting with chief complaint of:  Right foot fracture.   Patient called yesterday to say that she had fallen.  I was working at our wound center.  Referred the patient to the emergency department.  She got an x-ray and fracture of the fifth metatarsal on the right foot was confirmed.  Patient was placed in a posterior splint.   Pt has been compliant with wearing cast.  She is sent for angiogram next week.  She has a wound on her fifth metatarsal head that is reopened.  She also has painful elongated nails.  Past Medical History  She has a past medical history of Contusion of unspecified part of head, initial encounter, Hyperlipidemia, Hypertension, Other chest pain (03/05/2018), Personal history of diseases of the skin and subcutaneous tissue (08/16/2019), Personal history of malignant neoplasm of other parts of uterus, Personal history of other diseases of the respiratory system (08/21/2020), Personal history of other diseases of the respiratory system (09/13/2020), Personal history of other endocrine, nutritional and metabolic disease, Renal disorder, Type 2 diabetes mellitus with other diabetic neurological complication (CMS/formerly [facility]) (11/30/2020), Type 2 diabetes mellitus with other skin complications (CMS/formerly [facility]) (03/18/2022), and Type 2 diabetes mellitus with other skin ulcer (CODE) (CMS/formerly [facility]).    Surgical History  She has a past surgical history that includes Tonsillectomy (11/11/2014); Cholecystectomy (11/11/2014); Hysterectomy (11/11/2014); Other surgical history (06/11/2019); Other surgical history (06/11/2019); Other surgical history (06/11/2019); MR angio head wo IV contrast (1/29/2021); and MR angio head wo IV contrast (11/7/2023).     Social History  She reports that she has never smoked. She has never used smokeless tobacco. She reports that she does not currently use alcohol after a past usage of about 1.0 standard drink of alcohol per week.  She reports that she does not use drugs.    Family History  Family History   Problem Relation Name Age of Onset    Other (Cardiac disorder) Mother      Diabetes Mother      Hypertension Mother      Lung disease Mother      Kidney cancer Mother      Diabetes Father      Other (Cardiac disorder) Father          Allergies  Latex and Sulfamethoxazole-trimethoprim    Medications  Current Outpatient Medications   Medication Sig Dispense Refill    albuterol (ProAir RespiClick) 90 mcg/actuation aerosol powdr breath activated inhaler INHALE 2 PUFFS Every 4 hours PRN 0.65 g 11    amLODIPine (Norvasc) 5 mg tablet Take 1 tablet (5 mg) by mouth once daily. 90 tablet 3    atorvastatin (Lipitor) 40 mg tablet Take 1 tablet (40 mg) by mouth once daily. 90 tablet 3    blood sugar diagnostic (Accu-Chek Ronda Plus test strp) strip TEST 3 TIMES DAILY.      carvedilol (Coreg) 12.5 mg tablet Take 1 tablet (12.5 mg) by mouth 2 times a day. 180 tablet 3    cetirizine (ZyrTEC) 10 mg tablet Take 1 tablet (10 mg) by mouth once daily. 90 tablet 3    fenofibrate (Triglide) 160 mg tablet Take 1 tablet (160 mg) by mouth once daily. 90 tablet 3    fluticasone (Flonase) 50 mcg/actuation nasal spray Administer 2 sprays into each nostril 2 times a day.      gabapentin (Neurontin) 100 mg capsule Take 1 capsule (100 mg) by mouth 2 times a day. (Patient taking differently: Take 1 capsule (100 mg) by mouth once daily.) 180 capsule 3    insulin glargine (Lantus) 100 unit/mL (3 mL) pen 40 Units every night 18 mL 3    insulin lispro (HumaLOG) 100 unit/mL injection 12-12-12-0 +scale TID 2 units for every 50 over 200 18 mL 3    latanoprost (Xalatan) 0.005 % ophthalmic solution Latanoprost 0.005 % Ophthalmic Solution  Quantity: 2   Refills: 0  Start: 27-May-2021      magnesium oxide,aspartate,citr (Triple Magnesium Complex) 400 mg magnesium capsule TAKE 3 CAPSULE Daily      mecobalamin, vitamin B12, 1,000 mcg tablet,disintegrating Place 1 tablet under the  tongue once daily. 90 tablet 3    nortriptyline (Pamelor) 10 mg capsule Take 3 capsules (30 mg) by mouth once daily at bedtime. 90 capsule 5    omeprazole (PriLOSEC) 40 mg DR capsule Take 1 capsule (40 mg) by mouth once daily. Do not crush or chew. 90 capsule 3    polyethylene glycol-electrolytes 420 gram solution TAKE AS DIRECTED.      sertraline (Zoloft) 100 mg tablet Take 2 tablets (200 mg) by mouth once daily. 60 tablet 5    LORazepam (Ativan) 0.5 mg tablet Take 1 tablet (0.5 mg) by mouth once daily as needed for anxiety. 30 tablet 0     No current facility-administered medications for this visit.       Review of Systems    REVIEW OF SYSTEMS  GENERAL:  Negative for malaise, significant weight loss, fever  CARDIOVASCULAR: leg swelling   MUSCULOSKELETAL:  Negative for joint pain or swelling, back pain, and muscle pain.  SKIN:  Negative for lesions, rash, and itching  PSYCH:  Negative for sleep disturbance, mood disorder and recent psychosocial stressors  NEURO: Negative, denies any burning, tingling or numbness     Objective: Patient has very poor balance  Vasc: DP and PT pulses are weakly palpable bilateral.  CFT is less than 3 seconds bilateral.  Skin temperature is warm to cool proximal to distal bilateral.      Neuro:  Light touch is  decreased    Derm: Nails are thickened, elongated and crumbly with subungual debris   Skin is supple with normal texture and turgor noted.  Webspaces are clean, dry and intact bilateral.  There are no hyperkeratoses, ulcerations, verruca or other lesions noted.  Ulcer subfifth metatarsal head-it is partial-thickness.  It is macerated periwound.    Ortho: Edema and erythema about the fifth metatarsal base right foot.  Patient has pain.  X-rays are personally reviewed and do show healing  fracture of the fifth metatarsal base.  Assessment/Plan     Diagnoses and all orders for this visit:  Type 2 diabetes mellitus with hypertriglyceridemia (CMS/HCC)  Fracture of right foot, with  routine healing, subsequent encounter  -     Walking Boot Short  Fracture of base of fifth metatarsal bone of right foot with routine healing, subsequent encounter patient may begin bearing weight on her right foot in 2 weeks.  We will repeat x-rays in 4 weeks.

## 2024-03-04 ENCOUNTER — TELEPHONE (OUTPATIENT)
Dept: PODIATRY | Facility: CLINIC | Age: 79
End: 2024-03-04
Payer: MEDICARE

## 2024-03-15 ENCOUNTER — OFFICE VISIT (OUTPATIENT)
Dept: WOUND CARE | Facility: CLINIC | Age: 79
End: 2024-03-15
Payer: MEDICARE

## 2024-03-15 PROCEDURE — 11042 DBRDMT SUBQ TIS 1ST 20SQCM/<: CPT | Performed by: PODIATRIST

## 2024-03-15 PROCEDURE — 11042 DBRDMT SUBQ TIS 1ST 20SQCM/<: CPT

## 2024-03-15 PROCEDURE — 1125F AMNT PAIN NOTED PAIN PRSNT: CPT | Performed by: PODIATRIST

## 2024-03-15 PROCEDURE — 1123F ACP DISCUSS/DSCN MKR DOCD: CPT | Performed by: PODIATRIST

## 2024-03-15 PROCEDURE — 99213 OFFICE O/P EST LOW 20 MIN: CPT | Mod: 25

## 2024-03-15 PROCEDURE — 1159F MED LIST DOCD IN RCRD: CPT | Performed by: PODIATRIST

## 2024-03-22 ENCOUNTER — OFFICE VISIT (OUTPATIENT)
Dept: WOUND CARE | Facility: CLINIC | Age: 79
End: 2024-03-22
Payer: MEDICARE

## 2024-03-22 DIAGNOSIS — S92.354D CLOSED NONDISPLACED FRACTURE OF FIFTH METATARSAL BONE OF RIGHT FOOT WITH ROUTINE HEALING, SUBSEQUENT ENCOUNTER: Primary | ICD-10-CM

## 2024-03-22 PROCEDURE — 1159F MED LIST DOCD IN RCRD: CPT | Performed by: PODIATRIST

## 2024-03-22 PROCEDURE — 99212 OFFICE O/P EST SF 10 MIN: CPT

## 2024-03-22 PROCEDURE — 1160F RVW MEDS BY RX/DR IN RCRD: CPT | Performed by: PODIATRIST

## 2024-03-22 PROCEDURE — 99213 OFFICE O/P EST LOW 20 MIN: CPT | Performed by: PODIATRIST

## 2024-03-22 PROCEDURE — 1123F ACP DISCUSS/DSCN MKR DOCD: CPT | Performed by: PODIATRIST

## 2024-03-22 PROCEDURE — 1036F TOBACCO NON-USER: CPT | Performed by: PODIATRIST

## 2024-03-28 ENCOUNTER — TELEPHONE (OUTPATIENT)
Dept: PRIMARY CARE | Facility: CLINIC | Age: 79
End: 2024-03-28
Payer: MEDICARE

## 2024-03-28 NOTE — TELEPHONE ENCOUNTER
[facility] /Riddhi RUIZ   is secure   [phone]    Requesting  an evaluation for osteoporosis   Would like an order for bone density d/t recent fx, rt foot    Please return call

## 2024-03-29 DIAGNOSIS — Z78.0 POSTMENOPAUSAL: Primary | ICD-10-CM

## 2024-04-01 ENCOUNTER — TELEPHONE (OUTPATIENT)
Dept: SCHEDULING | Age: 79
End: 2024-04-01
Payer: MEDICARE

## 2024-04-02 ENCOUNTER — HOSPITAL ENCOUNTER (OUTPATIENT)
Dept: RADIOLOGY | Facility: CLINIC | Age: 79
Discharge: HOME | End: 2024-04-02
Payer: MEDICARE

## 2024-04-02 DIAGNOSIS — S92.354D CLOSED NONDISPLACED FRACTURE OF FIFTH METATARSAL BONE OF RIGHT FOOT WITH ROUTINE HEALING, SUBSEQUENT ENCOUNTER: ICD-10-CM

## 2024-04-02 PROCEDURE — 73630 X-RAY EXAM OF FOOT: CPT | Mod: RIGHT SIDE | Performed by: RADIOLOGY

## 2024-04-02 PROCEDURE — 73630 X-RAY EXAM OF FOOT: CPT | Mod: RT

## 2024-04-04 ENCOUNTER — LAB (OUTPATIENT)
Dept: LAB | Facility: LAB | Age: 79
End: 2024-04-04
Payer: MEDICARE

## 2024-04-04 DIAGNOSIS — E79.0 HYPERURICEMIA WITHOUT SIGNS OF INFLAMMATORY ARTHRITIS AND TOPHACEOUS DISEASE: ICD-10-CM

## 2024-04-04 DIAGNOSIS — E11.21 TYPE 2 DIABETES MELLITUS WITH DIABETIC NEPHROPATHY (MULTI): ICD-10-CM

## 2024-04-04 DIAGNOSIS — E11.65 TYPE 2 DIABETES MELLITUS WITH HYPERGLYCEMIA, UNSPECIFIED WHETHER LONG TERM INSULIN USE (MULTI): ICD-10-CM

## 2024-04-04 DIAGNOSIS — E55.9 VITAMIN D DEFICIENCY, UNSPECIFIED: ICD-10-CM

## 2024-04-04 DIAGNOSIS — E11.22 CKD STAGE 3 SECONDARY TO DIABETES (MULTI): ICD-10-CM

## 2024-04-04 DIAGNOSIS — N18.30 CKD STAGE 3 SECONDARY TO DIABETES (MULTI): ICD-10-CM

## 2024-04-04 DIAGNOSIS — N18.4 CHRONIC KIDNEY DISEASE, STAGE 4 (SEVERE) (MULTI): ICD-10-CM

## 2024-04-04 DIAGNOSIS — D63.1 ANEMIA IN CHRONIC KIDNEY DISEASE (CODE): ICD-10-CM

## 2024-04-04 DIAGNOSIS — N18.9 CHRONIC KIDNEY DISEASE, UNSPECIFIED: Primary | ICD-10-CM

## 2024-04-04 DIAGNOSIS — I10 ESSENTIAL HYPERTENSION: ICD-10-CM

## 2024-04-04 DIAGNOSIS — M25.50 ARTHRALGIA, UNSPECIFIED JOINT: ICD-10-CM

## 2024-04-04 DIAGNOSIS — E07.9 THYROID DISEASE: ICD-10-CM

## 2024-04-04 LAB
25(OH)D3 SERPL-MCNC: 18 NG/ML (ref 30–100)
ALBUMIN SERPL BCP-MCNC: 4.3 G/DL (ref 3.4–5)
ANION GAP SERPL CALC-SCNC: 11 MMOL/L (ref 10–20)
BASOPHILS # BLD AUTO: 0.05 X10*3/UL (ref 0–0.1)
BASOPHILS NFR BLD AUTO: 1 %
BUN SERPL-MCNC: 33 MG/DL (ref 6–23)
CALCIUM SERPL-MCNC: 9.1 MG/DL (ref 8.6–10.3)
CHLORIDE SERPL-SCNC: 102 MMOL/L (ref 98–107)
CO2 SERPL-SCNC: 26 MMOL/L (ref 21–32)
CREAT SERPL-MCNC: 1.8 MG/DL (ref 0.5–1.05)
CREAT UR-MCNC: 27.9 MG/DL (ref 20–320)
EGFRCR SERPLBLD CKD-EPI 2021: 29 ML/MIN/1.73M*2
EOSINOPHIL # BLD AUTO: 0.25 X10*3/UL (ref 0–0.4)
EOSINOPHIL NFR BLD AUTO: 4.8 %
ERYTHROCYTE [DISTWIDTH] IN BLOOD BY AUTOMATED COUNT: 14.1 % (ref 11.5–14.5)
EST. AVERAGE GLUCOSE BLD GHB EST-MCNC: 131 MG/DL
GLUCOSE SERPL-MCNC: 156 MG/DL (ref 74–99)
HBA1C MFR BLD: 6.2 %
HCT VFR BLD AUTO: 32.4 % (ref 36–46)
HGB BLD-MCNC: 11.3 G/DL (ref 12–16)
IMM GRANULOCYTES # BLD AUTO: 0.02 X10*3/UL (ref 0–0.5)
IMM GRANULOCYTES NFR BLD AUTO: 0.4 % (ref 0–0.9)
LYMPHOCYTES # BLD AUTO: 1.46 X10*3/UL (ref 0.8–3)
LYMPHOCYTES NFR BLD AUTO: 28.1 %
MAGNESIUM SERPL-MCNC: 1.71 MG/DL (ref 1.6–2.4)
MCH RBC QN AUTO: 26.9 PG (ref 26–34)
MCHC RBC AUTO-ENTMCNC: 34.9 G/DL (ref 32–36)
MCV RBC AUTO: 77 FL (ref 80–100)
MICROALBUMIN UR-MCNC: 148.2 MG/L
MICROALBUMIN/CREAT UR: 531.2 UG/MG CREAT
MONOCYTES # BLD AUTO: 0.32 X10*3/UL (ref 0.05–0.8)
MONOCYTES NFR BLD AUTO: 6.2 %
NEUTROPHILS # BLD AUTO: 3.1 X10*3/UL (ref 1.6–5.5)
NEUTROPHILS NFR BLD AUTO: 59.5 %
NRBC BLD-RTO: 0 /100 WBCS (ref 0–0)
PHOSPHATE SERPL-MCNC: 3.2 MG/DL (ref 2.5–4.9)
PLATELET # BLD AUTO: 140 X10*3/UL (ref 150–450)
POTASSIUM SERPL-SCNC: 4.5 MMOL/L (ref 3.5–5.3)
PTH-INTACT SERPL-MCNC: 83.3 PG/ML (ref 18.5–88)
RBC # BLD AUTO: 4.2 X10*6/UL (ref 4–5.2)
SODIUM SERPL-SCNC: 134 MMOL/L (ref 136–145)
TSH SERPL-ACNC: 2.84 MIU/L (ref 0.44–3.98)
URATE SERPL-MCNC: 6.7 MG/DL (ref 2.3–6.7)
WBC # BLD AUTO: 5.2 X10*3/UL (ref 4.4–11.3)

## 2024-04-04 PROCEDURE — 82043 UR ALBUMIN QUANTITATIVE: CPT

## 2024-04-04 PROCEDURE — 83970 ASSAY OF PARATHORMONE: CPT

## 2024-04-04 PROCEDURE — 85025 COMPLETE CBC W/AUTO DIFF WBC: CPT

## 2024-04-04 PROCEDURE — 36415 COLL VENOUS BLD VENIPUNCTURE: CPT

## 2024-04-04 PROCEDURE — 82570 ASSAY OF URINE CREATININE: CPT

## 2024-04-04 PROCEDURE — 84443 ASSAY THYROID STIM HORMONE: CPT

## 2024-04-04 PROCEDURE — 84550 ASSAY OF BLOOD/URIC ACID: CPT

## 2024-04-04 PROCEDURE — 83735 ASSAY OF MAGNESIUM: CPT

## 2024-04-04 PROCEDURE — 80069 RENAL FUNCTION PANEL: CPT

## 2024-04-04 PROCEDURE — 82306 VITAMIN D 25 HYDROXY: CPT

## 2024-04-04 PROCEDURE — 83036 HEMOGLOBIN GLYCOSYLATED A1C: CPT

## 2024-04-11 ENCOUNTER — OFFICE VISIT (OUTPATIENT)
Dept: PODIATRY | Facility: CLINIC | Age: 79
End: 2024-04-11
Payer: MEDICARE

## 2024-04-11 DIAGNOSIS — S92.351D FRACTURE OF BASE OF FIFTH METATARSAL BONE OF RIGHT FOOT WITH ROUTINE HEALING, SUBSEQUENT ENCOUNTER: ICD-10-CM

## 2024-04-11 DIAGNOSIS — L97.522 FOOT ULCER WITH FAT LAYER EXPOSED, LEFT (MULTI): ICD-10-CM

## 2024-04-11 DIAGNOSIS — E10.43 DIABETIC AUTONOMIC NEUROPATHY ASSOCIATED WITH TYPE 1 DIABETES MELLITUS (MULTI): Primary | ICD-10-CM

## 2024-04-11 PROCEDURE — 1123F ACP DISCUSS/DSCN MKR DOCD: CPT | Performed by: PODIATRIST

## 2024-04-11 PROCEDURE — 99213 OFFICE O/P EST LOW 20 MIN: CPT | Performed by: PODIATRIST

## 2024-04-11 PROCEDURE — 1160F RVW MEDS BY RX/DR IN RCRD: CPT | Performed by: PODIATRIST

## 2024-04-11 PROCEDURE — 1159F MED LIST DOCD IN RCRD: CPT | Performed by: PODIATRIST

## 2024-04-11 NOTE — PROGRESS NOTES
History Of Present Illness  Elma Hernandez is a 78 y.o. female presenting with chief complaint of:  Right foot fracture.  Patient has been compliant with cam walker.  Patient just saw vascular today.  Her flow is much improved to her left foot.  Her ulcer remains closed.  Past Medical History  She has a past medical history of Contusion of unspecified part of head, initial encounter, Hyperlipidemia, Hypertension, Other chest pain (03/05/2018), Personal history of diseases of the skin and subcutaneous tissue (08/16/2019), Personal history of malignant neoplasm of other parts of uterus, Personal history of other diseases of the respiratory system (08/21/2020), Personal history of other diseases of the respiratory system (09/13/2020), Personal history of other endocrine, nutritional and metabolic disease, Renal disorder, Type 2 diabetes mellitus with other diabetic neurological complication (CMS/[facility]) (11/30/2020), Type 2 diabetes mellitus with other skin complications (CMS/[facility]) (03/18/2022), and Type 2 diabetes mellitus with other skin ulcer (CODE) (CMS/[facility]).    Surgical History  She has a past surgical history that includes Tonsillectomy (11/11/2014); Cholecystectomy (11/11/2014); Hysterectomy (11/11/2014); Other surgical history (06/11/2019); Other surgical history (06/11/2019); Other surgical history (06/11/2019); MR angio head wo IV contrast (1/29/2021); and MR angio head wo IV contrast (11/7/2023).     Social History  She reports that she has never smoked. She has never used smokeless tobacco. She reports that she does not currently use alcohol after a past usage of about 1.0 standard drink of alcohol per week. She reports that she does not use drugs.    Family History  Family History   Problem Relation Name Age of Onset    Other (Cardiac disorder) Mother      Diabetes Mother      Hypertension Mother      Lung disease Mother      Kidney cancer Mother      Diabetes Father      Other (Cardiac disorder) Father           Allergies  Latex and Sulfamethoxazole-trimethoprim    Medications  Current Outpatient Medications   Medication Sig Dispense Refill    albuterol (ProAir RespiClick) 90 mcg/actuation aerosol powdr breath activated inhaler INHALE 2 PUFFS Every 4 hours PRN 0.65 g 11    amLODIPine (Norvasc) 5 mg tablet Take 1 tablet (5 mg) by mouth once daily. 90 tablet 3    atorvastatin (Lipitor) 40 mg tablet Take 1 tablet (40 mg) by mouth once daily. 90 tablet 3    blood sugar diagnostic (Accu-Chek Ronda Plus test strp) strip TEST 3 TIMES DAILY.      carvedilol (Coreg) 12.5 mg tablet Take 1 tablet (12.5 mg) by mouth 2 times a day. 180 tablet 3    cetirizine (ZyrTEC) 10 mg tablet Take 1 tablet (10 mg) by mouth once daily. 90 tablet 3    fenofibrate (Triglide) 160 mg tablet Take 1 tablet (160 mg) by mouth once daily. 90 tablet 3    fluticasone (Flonase) 50 mcg/actuation nasal spray Administer 2 sprays into each nostril 2 times a day.      gabapentin (Neurontin) 100 mg capsule Take 1 capsule (100 mg) by mouth 2 times a day. (Patient taking differently: Take 1 capsule (100 mg) by mouth once daily.) 180 capsule 3    insulin glargine (Lantus) 100 unit/mL (3 mL) pen 40 Units every night 18 mL 3    insulin lispro (HumaLOG) 100 unit/mL injection 12-12-12-0 +scale TID 2 units for every 50 over 200 18 mL 3    latanoprost (Xalatan) 0.005 % ophthalmic solution Latanoprost 0.005 % Ophthalmic Solution  Quantity: 2   Refills: 0  Start: 27-May-2021      magnesium oxide,aspartate,citr (Triple Magnesium Complex) 400 mg magnesium capsule TAKE 3 CAPSULE Daily      mecobalamin, vitamin B12, 1,000 mcg tablet,disintegrating Place 1 tablet under the tongue once daily. 90 tablet 3    nortriptyline (Pamelor) 10 mg capsule Take 3 capsules (30 mg) by mouth once daily at bedtime. 90 capsule 5    omeprazole (PriLOSEC) 40 mg DR capsule Take 1 capsule (40 mg) by mouth once daily. Do not crush or chew. 90 capsule 3    polyethylene glycol-electrolytes 420 gram  solution TAKE AS DIRECTED.      sertraline (Zoloft) 100 mg tablet Take 2 tablets (200 mg) by mouth once daily. 60 tablet 5    LORazepam (Ativan) 0.5 mg tablet Take 1 tablet (0.5 mg) by mouth once daily as needed for anxiety. 30 tablet 0     No current facility-administered medications for this visit.       Review of Systems    REVIEW OF SYSTEMS  GENERAL:  Negative for malaise, significant weight loss, fever  CARDIOVASCULAR: leg swelling   MUSCULOSKELETAL:  Negative for joint pain or swelling, back pain, and muscle pain.  SKIN:  Negative for lesions, rash, and itching  PSYCH:  Negative for sleep disturbance, mood disorder and recent psychosocial stressors  NEURO: Negative, denies any burning, tingling or numbness     Objective: Patient has very poor balance  Vasc: DP and PT pulses are weakly palpable bilateral.  CFT is less than 3 seconds bilateral.  Skin temperature is warm to cool proximal to distal bilateral.      Neuro:  Light touch is  decreased    Derm: Nails are thickened, elongated and crumbly with subungual debris   Skin is supple with normal texture and turgor noted.  Webspaces are clean, dry and intact bilateral.  There are no hyperkeratoses, ulcerations, verruca or other lesions noted.  Ulcer subfifth metatarsal head-it is resolved    Ortho: No edema edema and erythema about the fifth metatarsal base right foot.  Patient has no pain.  X-rays are personally reviewed and do show healing  fracture of the fifth metatarsal base.  Patient does have a hammertoe on the third right toe.  There is some distal erythema on the plantar toe pad.  Assessment/Plan     Diagnoses and all orders for this visit:  Type 2 diabetes mellitus with hypertriglyceridemia (CMS/HCC)  Fracture of right foot, with routine healing, subsequent encounter  Patient can now return to regular shoes.  She can continue to pad and protect for her foot.  I will give her prescription for new diabetic shoes.  Patient can follow-up with me in 3  months.

## 2024-04-12 ENCOUNTER — OFFICE VISIT (OUTPATIENT)
Dept: PRIMARY CARE | Facility: CLINIC | Age: 79
End: 2024-04-12
Payer: MEDICARE

## 2024-04-12 VITALS
RESPIRATION RATE: 16 BRPM | HEIGHT: 65 IN | WEIGHT: 176 LBS | DIASTOLIC BLOOD PRESSURE: 54 MMHG | SYSTOLIC BLOOD PRESSURE: 118 MMHG | TEMPERATURE: 97.7 F | BODY MASS INDEX: 29.32 KG/M2

## 2024-04-12 DIAGNOSIS — M80.80XD LOCALIZED OSTEOPOROSIS WITH CURRENT PATHOLOGICAL FRACTURE WITH ROUTINE HEALING, SUBSEQUENT ENCOUNTER: ICD-10-CM

## 2024-04-12 DIAGNOSIS — D69.6 THROMBOCYTOPENIA (CMS-HCC): ICD-10-CM

## 2024-04-12 DIAGNOSIS — E11.3313 TYPE 2 DIABETES MELLITUS WITH BOTH EYES AFFECTED BY MODERATE NONPROLIFERATIVE RETINOPATHY AND MACULAR EDEMA, WITH LONG-TERM CURRENT USE OF INSULIN (MULTI): ICD-10-CM

## 2024-04-12 DIAGNOSIS — I73.9 PERIPHERAL VASCULAR DISEASE (CMS-HCC): ICD-10-CM

## 2024-04-12 DIAGNOSIS — I10 ESSENTIAL HYPERTENSION: ICD-10-CM

## 2024-04-12 DIAGNOSIS — Z79.4 TYPE 2 DIABETES MELLITUS WITH BOTH EYES AFFECTED BY MODERATE NONPROLIFERATIVE RETINOPATHY AND MACULAR EDEMA, WITH LONG-TERM CURRENT USE OF INSULIN (MULTI): ICD-10-CM

## 2024-04-12 DIAGNOSIS — F41.9 ANXIETY: ICD-10-CM

## 2024-04-12 DIAGNOSIS — I70.234 ATHEROSCLEROSIS OF NATIVE ARTERY OF RIGHT LOWER EXTREMITY WITH ULCERATION OF HEEL (MULTI): Primary | ICD-10-CM

## 2024-04-12 DIAGNOSIS — E55.9 VITAMIN D DEFICIENCY: ICD-10-CM

## 2024-04-12 DIAGNOSIS — E78.2 HYPERLIPIDEMIA, MIXED: ICD-10-CM

## 2024-04-12 PROCEDURE — 3078F DIAST BP <80 MM HG: CPT | Performed by: INTERNAL MEDICINE

## 2024-04-12 PROCEDURE — 99214 OFFICE O/P EST MOD 30 MIN: CPT | Performed by: INTERNAL MEDICINE

## 2024-04-12 PROCEDURE — 1160F RVW MEDS BY RX/DR IN RCRD: CPT | Performed by: INTERNAL MEDICINE

## 2024-04-12 PROCEDURE — 1159F MED LIST DOCD IN RCRD: CPT | Performed by: INTERNAL MEDICINE

## 2024-04-12 PROCEDURE — 1123F ACP DISCUSS/DSCN MKR DOCD: CPT | Performed by: INTERNAL MEDICINE

## 2024-04-12 PROCEDURE — 3074F SYST BP LT 130 MM HG: CPT | Performed by: INTERNAL MEDICINE

## 2024-04-12 RX ORDER — PANTOPRAZOLE SODIUM 20 MG/1
20 TABLET, DELAYED RELEASE ORAL
COMMUNITY
Start: 2024-03-08 | End: 2024-06-06

## 2024-04-12 RX ORDER — CLOPIDOGREL BISULFATE 75 MG/1
75 TABLET ORAL DAILY
COMMUNITY

## 2024-04-12 ASSESSMENT — ENCOUNTER SYMPTOMS
HYPERTENSION: 1
DIZZINESS: 1

## 2024-04-12 NOTE — PROGRESS NOTES
"Subjective   Elma Hernandez is a 78 y.o. female who presents for Follow-up and Hypertension.  Patient stated is feeling a little dizziness.  Patient would like to know if she can take Zyrtec with the new med. She was given.    Hypertension  This is a chronic problem. The problem is controlled.     Review of Systems   Neurological:  Positive for dizziness.   All other systems reviewed and are negative.      Objective   Physical Exam  Vitals reviewed.   Constitutional:       Appearance: Normal appearance.   HENT:      Head: Normocephalic.   Cardiovascular:      Rate and Rhythm: Normal rate.   Pulmonary:      Effort: Pulmonary effort is normal.   Musculoskeletal:         General: Normal range of motion.   Neurological:      General: No focal deficit present.      Mental Status: She is alert.   Psychiatric:         Mood and Affect: Mood normal.       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    HTN is controlled.    We reviewed and discussed her recent blood test results.    PAD is stable. We discussed need to control risk factors - HTN, DM and mixed HLD.  Medications were reviewed.  Stressed need for compliance.    Platelet count was reviewed as well.   Following with " specialists.

## 2024-05-03 ENCOUNTER — OFFICE VISIT (OUTPATIENT)
Dept: PRIMARY CARE | Facility: CLINIC | Age: 79
End: 2024-05-03
Payer: MEDICARE

## 2024-05-03 VITALS
TEMPERATURE: 96 F | HEIGHT: 65 IN | OXYGEN SATURATION: 96 % | DIASTOLIC BLOOD PRESSURE: 84 MMHG | SYSTOLIC BLOOD PRESSURE: 130 MMHG | RESPIRATION RATE: 14 BRPM | HEART RATE: 72 BPM | WEIGHT: 177 LBS | BODY MASS INDEX: 29.49 KG/M2

## 2024-05-03 DIAGNOSIS — L65.9 ALOPECIA: Primary | ICD-10-CM

## 2024-05-03 DIAGNOSIS — E11.43 DIABETIC AUTONOMIC NEUROPATHY ASSOCIATED WITH TYPE 2 DIABETES MELLITUS (MULTI): ICD-10-CM

## 2024-05-03 DIAGNOSIS — B37.31 VAGINAL YEAST INFECTION: ICD-10-CM

## 2024-05-03 DIAGNOSIS — L97.529 ULCER OF LEFT FOOT, UNSPECIFIED ULCER STAGE (MULTI): ICD-10-CM

## 2024-05-03 DIAGNOSIS — R26.89 POOR BALANCE: ICD-10-CM

## 2024-05-03 PROCEDURE — 3075F SYST BP GE 130 - 139MM HG: CPT | Performed by: INTERNAL MEDICINE

## 2024-05-03 PROCEDURE — 1160F RVW MEDS BY RX/DR IN RCRD: CPT | Performed by: INTERNAL MEDICINE

## 2024-05-03 PROCEDURE — 1123F ACP DISCUSS/DSCN MKR DOCD: CPT | Performed by: INTERNAL MEDICINE

## 2024-05-03 PROCEDURE — 1159F MED LIST DOCD IN RCRD: CPT | Performed by: INTERNAL MEDICINE

## 2024-05-03 PROCEDURE — 3079F DIAST BP 80-89 MM HG: CPT | Performed by: INTERNAL MEDICINE

## 2024-05-03 PROCEDURE — 99213 OFFICE O/P EST LOW 20 MIN: CPT | Performed by: INTERNAL MEDICINE

## 2024-05-03 PROCEDURE — 1158F ADVNC CARE PLAN TLK DOCD: CPT | Performed by: INTERNAL MEDICINE

## 2024-05-03 RX ORDER — FLUCONAZOLE 150 MG/1
150 TABLET ORAL ONCE
Qty: 1 TABLET | Refills: 0 | Status: SHIPPED | OUTPATIENT
Start: 2024-05-03 | End: 2024-05-03

## 2024-05-03 ASSESSMENT — ENCOUNTER SYMPTOMS
WHEEZING: 0
DIARRHEA: 0
CONSTIPATION: 0
COUGH: 0
ABDOMINAL PAIN: 0
ROS SKIN COMMENTS: HAIR LOSS
SHORTNESS OF BREATH: 0
PALPITATIONS: 0
NAUSEA: 0

## 2024-05-03 NOTE — PROGRESS NOTES
"Subjective   Patient ID: Elma Hernandez is a 78 y.o. female who presents for Alopecia.    Hair has been thinning for years.  We reviewed her recent blood tests. Thyroid is at goal.    She has. been under a great deal of stress and anxiety , but lately these have been much better as well.    Sleeping well.    She admits her mother had very thin hair.    She also complains of vaginal discharge.    We looked at hid foot ulcer as well    Review of Systems   Respiratory:  Negative for cough, shortness of breath and wheezing.    Cardiovascular:  Negative for chest pain and palpitations.   Gastrointestinal:  Negative for abdominal pain, constipation, diarrhea and nausea.   Skin:         Hair loss       Objective   /84 (BP Location: Left arm, Patient Position: Sitting, BP Cuff Size: Adult)   Pulse 72   Temp 35.6 °C (96 °F) (Tympanic)   Resp 14   Ht 1.651 m (5' 5\")   Wt 80.3 kg (177 lb)   SpO2 96%   BMI 29.45 kg/m²     Physical Exam  Vitals reviewed.   Constitutional:       Appearance: Normal appearance.   HENT:      Head: Normocephalic.   Cardiovascular:      Rate and Rhythm: Normal rate.   Pulmonary:      Effort: Pulmonary effort is normal.   Musculoskeletal:         General: Normal range of motion.   Skin:     General: Skin is warm and dry.      Comments: Healing ulcer to left foot.  Somewhat dry.  Healed over.  No exudate, erythema or warmth.     Neurological:      General: No focal deficit present.      Mental Status: She is alert.   Psychiatric:         Mood and Affect: Mood normal.         Assessment/Plan   Problem List Items Addressed This Visit             ICD-10-CM    Diabetic neuropathy (Multi) E11.40    Poor balance R26.89    Relevant Orders    Referral to Physical Therapy     Other Visit Diagnoses         Codes    Alopecia    -  Primary L65.9    Ulcer of left foot, unspecified ulcer stage (Multi)     L97.529    Vaginal yeast infection     B37.31        We discussed adding supplement skin, hair and " nails.  If sx persists then derm.  We discussed diflucan x1.  If symptom persist then gyn.    Foot is healing.  She should continue to follow with podiatry.

## 2024-06-18 ENCOUNTER — TELEPHONE (OUTPATIENT)
Dept: PEDIATRICS | Facility: CLINIC | Age: 79
End: 2024-06-18
Payer: MEDICARE

## 2024-06-18 DIAGNOSIS — F41.9 ANXIETY: ICD-10-CM

## 2024-06-18 DIAGNOSIS — G47.00 INSOMNIA, UNSPECIFIED TYPE: ICD-10-CM

## 2024-06-18 RX ORDER — NORTRIPTYLINE HYDROCHLORIDE 10 MG/1
30 CAPSULE ORAL NIGHTLY
Qty: 90 CAPSULE | Refills: 5 | Status: SHIPPED | OUTPATIENT
Start: 2024-06-18 | End: 2024-12-15

## 2024-06-18 NOTE — TELEPHONE ENCOUNTER
Rx Refill Request Telephone Encounter    Name:  Elma Hernandez  :  [id number]  Medication Name:  nortriptyline (Pamelor) 10 mg capsule   Specific Pharmacy location:  [id number] - [address]   Date of last appointment:  24  Date of next appointment:  10/18/24  Best number to reach patient:  [phone]

## 2024-06-26 ENCOUNTER — TELEPHONE (OUTPATIENT)
Dept: PEDIATRICS | Facility: CLINIC | Age: 79
End: 2024-06-26
Payer: MEDICARE

## 2024-06-26 DIAGNOSIS — F41.9 ANXIETY: ICD-10-CM

## 2024-06-26 RX ORDER — SERTRALINE HYDROCHLORIDE 100 MG/1
200 TABLET, FILM COATED ORAL DAILY
Qty: 180 TABLET | Refills: 3 | Status: SHIPPED | OUTPATIENT
Start: 2024-06-26 | End: 2025-06-26

## 2024-07-16 ENCOUNTER — OFFICE VISIT (OUTPATIENT)
Dept: ENDOCRINOLOGY | Facility: CLINIC | Age: 79
End: 2024-07-16
Payer: MEDICARE

## 2024-07-16 VITALS
SYSTOLIC BLOOD PRESSURE: 150 MMHG | BODY MASS INDEX: 29.16 KG/M2 | HEIGHT: 65 IN | DIASTOLIC BLOOD PRESSURE: 68 MMHG | WEIGHT: 175 LBS

## 2024-07-16 DIAGNOSIS — I10 ESSENTIAL HYPERTENSION: ICD-10-CM

## 2024-07-16 DIAGNOSIS — Z97.8 USES SELF-APPLIED CONTINUOUS GLUCOSE MONITORING DEVICE: ICD-10-CM

## 2024-07-16 DIAGNOSIS — E78.1 FAMILIAL HYPERTRIGLYCERIDEMIA: ICD-10-CM

## 2024-07-16 DIAGNOSIS — E78.2 HYPERLIPIDEMIA, MIXED: ICD-10-CM

## 2024-07-16 DIAGNOSIS — E11.65 TYPE 2 DIABETES MELLITUS WITH HYPERGLYCEMIA, WITH LONG-TERM CURRENT USE OF INSULIN (MULTI): Primary | ICD-10-CM

## 2024-07-16 DIAGNOSIS — Z79.4 TYPE 2 DIABETES MELLITUS WITH HYPERGLYCEMIA, WITH LONG-TERM CURRENT USE OF INSULIN (MULTI): Primary | ICD-10-CM

## 2024-07-16 LAB
POC FINGERSTICK BLOOD GLUCOSE: 150 MG/DL (ref 70–100)
POC HEMOGLOBIN A1C: 6.6 % (ref 4.2–6.5)

## 2024-07-16 PROCEDURE — 1159F MED LIST DOCD IN RCRD: CPT | Performed by: HOSPITALIST

## 2024-07-16 PROCEDURE — 3077F SYST BP >= 140 MM HG: CPT | Performed by: HOSPITALIST

## 2024-07-16 PROCEDURE — 3078F DIAST BP <80 MM HG: CPT | Performed by: HOSPITALIST

## 2024-07-16 PROCEDURE — 99214 OFFICE O/P EST MOD 30 MIN: CPT | Performed by: HOSPITALIST

## 2024-07-16 PROCEDURE — 82962 GLUCOSE BLOOD TEST: CPT | Performed by: HOSPITALIST

## 2024-07-16 PROCEDURE — 83036 HEMOGLOBIN GLYCOSYLATED A1C: CPT | Performed by: HOSPITALIST

## 2024-07-16 PROCEDURE — 95251 CONT GLUC MNTR ANALYSIS I&R: CPT | Performed by: HOSPITALIST

## 2024-07-16 PROCEDURE — 1123F ACP DISCUSS/DSCN MKR DOCD: CPT | Performed by: HOSPITALIST

## 2024-07-16 RX ORDER — FOLIC ACID/VIT B COMPLEX AND C 0.8 MG
1 TABLET ORAL
COMMUNITY
Start: 2024-07-01

## 2024-07-16 ASSESSMENT — ENCOUNTER SYMPTOMS
CONSTIPATION: 0
VOICE CHANGE: 0
AGITATION: 0
TROUBLE SWALLOWING: 0
DIARRHEA: 0
CHEST TIGHTNESS: 0
FREQUENCY: 0
NAUSEA: 0
ABDOMINAL PAIN: 0
HEADACHES: 0
PALPITATIONS: 0
PHOTOPHOBIA: 0
ARTHRALGIAS: 0
SORE THROAT: 0
EYE ITCHING: 0
DYSURIA: 0
TREMORS: 0
SHORTNESS OF BREATH: 0
LIGHT-HEADEDNESS: 0
CONSTITUTIONAL NEGATIVE: 1
VOMITING: 0
NERVOUS/ANXIOUS: 0
SLEEP DISTURBANCE: 0
ABDOMINAL DISTENTION: 0

## 2024-07-16 NOTE — PATIENT INSTRUCTIONS
DECREASE LANTUS TO 36 UNITS   TAKE HUMALOG 10-15 MIN BEFORE  EATING   IF SUGAR < 150 AT BEDTIME HAVE 2 CRACKERS AND TBSP PEANUT BUTTER    TAKE OVER THE COUNTER PROBIOTIC PILL   USE MICONAZOLE POWDER IN GROIN IN CASE OF ITCHING IN THE GROIN

## 2024-07-16 NOTE — PROGRESS NOTES
Subjective   Patient ID: Elma Hernandez is a 79 y.o. female who presents for Diabetes (dx dm: 1990/[facility] DM: father /PCP: [facility]ut /Podiatrist: Sukhdeepne /Eye exam: every 2 month / /Patient testing glucose 4 times daily, with freestyle jose manuel 2 READER. due to fluctuating blood glucose, hypoglycemia and 4 insulin injections. /Patient is injecting meal time insulin 3 times daily based on glucose reading and sliding scale. Pt adhering and benefiting from cgm treatment. /4/4/2024 hga1c 6.2%).  Lab Results   Component Value Date    HGBA1C 6.6 (A) 07/16/2024      HPI   SEE ap     Review of Systems   Constitutional: Negative.    HENT:  Negative for sore throat, trouble swallowing and voice change.    Eyes:  Negative for photophobia, itching and visual disturbance.   Respiratory:  Negative for chest tightness and shortness of breath.    Cardiovascular:  Negative for chest pain and palpitations.   Gastrointestinal:  Negative for abdominal distention, abdominal pain, constipation, diarrhea, nausea and vomiting.   Endocrine: Negative for cold intolerance, heat intolerance and polyuria.   Genitourinary:  Negative for dysuria and frequency.   Musculoskeletal:  Negative for arthralgias.   Skin:  Negative for pallor.   Allergic/Immunologic: Negative for environmental allergies.   Neurological:  Negative for tremors, light-headedness and headaches.   Psychiatric/Behavioral:  Negative for agitation and sleep disturbance. The patient is not nervous/anxious.        Objective   Physical Exam  Constitutional:       Appearance: Normal appearance.   HENT:      Head: Normocephalic.      Nose: Nose normal.      Mouth/Throat:      Mouth: Mucous membranes are moist.   Eyes:      Extraocular Movements: Extraocular movements intact.   Cardiovascular:      Rate and Rhythm: Normal rate.   Pulmonary:      Effort: Pulmonary effort is normal. No respiratory distress.   Abdominal:      General: There is no distension.   Musculoskeletal:          "General: Normal range of motion.      Cervical back: Normal range of motion and neck supple.   Skin:     General: Skin is warm and dry.   Neurological:      Mental Status: She is alert and oriented to person, place, and time.   Psychiatric:         Mood and Affect: Mood normal.      Visit Vitals  /68   Ht 1.651 m (5' 5\")   Wt 79.4 kg (175 lb)   BMI 29.12 kg/m²   OB Status Hysterectomy   Smoking Status Never   BSA 1.91 m²        Assessment/Plan   Diagnoses and all orders for this visit:  Type 2 diabetes mellitus with hyperglycemia, with long-term current use of insulin (Multi)  -     POCT glucose manually resulted  -     POCT glycosylated hemoglobin (Hb A1C) manually resulted  Uses self-applied continuous glucose monitoring device  Essential hypertension  Familial hypertriglyceridemia  Hyperlipidemia, mixed            T2 DM , controlled complicated with neuropathy and retinopathy ( getting monthly injections )    h/o CAD     current regimen:   Humalog 14-14-14-0 +scale TID 2 units for every 50 over 200  Lantus 0-0-0-38     Stop Ozempic November 2023 due to severe GI issues even on 0.25 mg dose. She was in [employer] [place] and was paying a lot for ozempic      A1c at goal   Personally reviewed CGM jose manuel 2 and interpreted the results  active 92 % , TIR 78 % , 8% low BG , takes humalog after eating        Interval h/o : Cataract surgery 3 weeks ago.    Fracture fifth metatarsal on the right foot recently       PLAN :   DECREASE LANTUS TO 36 UNITS   TAKE HUMALOG 10-15 MIN BEFORE  EATING   IF SUGAR < 150 AT BEDTIME HAVE 2 CRACKERS AND TBSP PEANUT BUTTER    TAKE OVER THE COUNTER PROBIOTIC PILL   USE MICONAZOLE POWDER IN GROIN IN CASE OF ITCHING IN THE GROIN     -We discussed trying Trulicity next visit.  Given shortage of Trulicity will hold off at this time.  - hypertG on statin and fenofibrate.     also has retinopathy,      CKD : has started following Dr. Acosta       PAD : follows vascular       HyeprTG: familial    on " statin and fenofibrate.   see above. not on vascepa , if TG high will add vascepa , repeat NV        RTC 4-5 m      - aug 25 - 50 yrs of marriage.     on ozempic  went to [place] in sept 2022   kids- 3 kids- 8 GK , 3 are from previous partner. 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   live in [place]    [place].    [facility] and [facility]   Having issues with anxiety

## 2024-07-25 ENCOUNTER — APPOINTMENT (OUTPATIENT)
Dept: PODIATRY | Facility: CLINIC | Age: 79
End: 2024-07-25
Payer: MEDICARE

## 2024-07-25 DIAGNOSIS — E11.3512 PROLIFERATIVE DIABETIC RETINOPATHY OF LEFT EYE WITH MACULAR EDEMA ASSOCIATED WITH TYPE 2 DIABETES MELLITUS (MULTI): Primary | ICD-10-CM

## 2024-07-25 DIAGNOSIS — M79.674 PAIN IN TOES OF BOTH FEET: ICD-10-CM

## 2024-07-25 DIAGNOSIS — M79.675 PAIN IN TOES OF BOTH FEET: ICD-10-CM

## 2024-07-25 DIAGNOSIS — B35.3 TINEA PEDIS OF BOTH FEET: ICD-10-CM

## 2024-07-25 DIAGNOSIS — B35.1 ONYCHOMYCOSIS: ICD-10-CM

## 2024-07-25 PROCEDURE — 99213 OFFICE O/P EST LOW 20 MIN: CPT | Performed by: PODIATRIST

## 2024-07-25 PROCEDURE — 1159F MED LIST DOCD IN RCRD: CPT | Performed by: PODIATRIST

## 2024-07-25 PROCEDURE — 1123F ACP DISCUSS/DSCN MKR DOCD: CPT | Performed by: PODIATRIST

## 2024-07-25 PROCEDURE — 1160F RVW MEDS BY RX/DR IN RCRD: CPT | Performed by: PODIATRIST

## 2024-07-25 RX ORDER — CLOTRIMAZOLE AND BETAMETHASONE DIPROPIONATE 10; .64 MG/G; MG/G
1 CREAM TOPICAL 2 TIMES DAILY
Qty: 45 G | Refills: 0 | Status: SHIPPED | OUTPATIENT
Start: 2024-07-25 | End: 2024-08-22

## 2024-07-25 NOTE — PROGRESS NOTES
History Of Present Illness  Elma Hernandez is a 79 y.o. female presenting for diabetic foot care. Patient complains with painful elongated nails.   PCP Dr Traylor  Last visit 5/3/24     Past Medical History  She has a past medical history of Contusion of unspecified part of head, initial encounter, Hyperlipidemia, Hypertension, Other chest pain (03/05/2018), Personal history of diseases of the skin and subcutaneous tissue (08/16/2019), Personal history of malignant neoplasm of other parts of uterus, Personal history of other diseases of the respiratory system (08/21/2020), Personal history of other diseases of the respiratory system (09/13/2020), Personal history of other endocrine, nutritional and metabolic disease, Renal disorder, Type 2 diabetes mellitus with other diabetic neurological complication (Multi) (11/30/2020), Type 2 diabetes mellitus with other skin complications (Multi) (03/18/2022), and Type 2 diabetes mellitus with other skin ulcer (CODE) (Multi).    Surgical History  She has a past surgical history that includes Tonsillectomy (11/11/2014); Cholecystectomy (11/11/2014); Hysterectomy (11/11/2014); Other surgical history (06/11/2019); Other surgical history (06/11/2019); Other surgical history (06/11/2019); MR angio head wo IV contrast (1/29/2021); and MR angio head wo IV contrast (11/7/2023).     Social History  She reports that she has never smoked. She has never used smokeless tobacco. She reports that she does not currently use alcohol after a past usage of about 1.0 standard drink of alcohol per week. She reports that she does not use drugs.    Family History  Family History   Problem Relation Name Age of Onset    Other (Cardiac disorder) Mother      Diabetes Mother      Hypertension Mother      Lung disease Mother      Kidney cancer Mother      Diabetes Father      Other (Cardiac disorder) Father          Allergies  Latex and Sulfamethoxazole-trimethoprim    Medications  Current Outpatient  Medications   Medication Sig Dispense Refill    albuterol (ProAir RespiClick) 90 mcg/actuation aerosol powdr breath activated inhaler INHALE 2 PUFFS Every 4 hours PRN 0.65 g 11    amLODIPine (Norvasc) 5 mg tablet Take 1 tablet (5 mg) by mouth once daily. 90 tablet 3    atorvastatin (Lipitor) 40 mg tablet Take 1 tablet (40 mg) by mouth once daily. 90 tablet 3    blood sugar diagnostic (Accu-Chek Ronda Plus test strp) strip TEST 3 TIMES DAILY.      carvedilol (Coreg) 12.5 mg tablet Take 1 tablet (12.5 mg) by mouth 2 times a day. 180 tablet 3    cetirizine (ZyrTEC) 10 mg tablet Take 1 tablet (10 mg) by mouth once daily. 90 tablet 3    clopidogrel (Plavix) 75 mg tablet Take 1 tablet (75 mg) by mouth once daily.      fluticasone (Flonase) 50 mcg/actuation nasal spray Administer 2 sprays into each nostril 2 times a day.      FreeStyle Yael 2 Sensor kit use as directed, change every 14 days 6 each 1    gabapentin (Neurontin) 100 mg capsule Take 1 capsule (100 mg) by mouth 2 times a day. 180 capsule 3    insulin glargine (Lantus) 100 unit/mL (3 mL) pen 40 Units every night (Patient taking differently: Inject 38 Units under the skin once daily at bedtime. 40 Units every night) 18 mL 3    insulin lispro (HumaLOG) 100 unit/mL injection 12-12-12-0 +scale TID 2 units for every 50 over 200 (Patient taking differently: 14-14-14-0 +scale TID 2 units for every 50 over 200) 18 mL 3    latanoprost (Xalatan) 0.005 % ophthalmic solution Latanoprost 0.005 % Ophthalmic Solution  Quantity: 2   Refills: 0  Start: 27-May-2021      magnesium oxide,aspartate,citr (Triple Magnesium Complex) 400 mg magnesium capsule TAKE 3 CAPSULE Daily      Nephro-Leslye 0.8 mg tablet Take 1 tablet by mouth early in the morning..      nortriptyline (Pamelor) 10 mg capsule Take 3 capsules (30 mg) by mouth once daily at bedtime. 90 capsule 5    sertraline (Zoloft) 100 mg tablet Take 2 tablets (200 mg) by mouth once daily. 180 tablet 3    fenofibrate (Triglide) 160  mg tablet Take 1 tablet (160 mg) by mouth once daily. 90 tablet 3    pantoprazole (ProtoNix) 20 mg EC tablet Take 1 tablet (20 mg) by mouth once daily.       No current facility-administered medications for this visit.       Review of Systems    REVIEW OF SYSTEMS  GENERAL:  Negative for malaise, significant weight loss, fever  CARDIOVASCULAR: leg swelling   MUSCULOSKELETAL:  Negative for joint pain or swelling, back pain, and muscle pain.  SKIN:  Negative for lesions, rash, and itching  PSYCH:  Negative for sleep disturbance, mood disorder and recent psychosocial stressors  NEURO: Negative, denies any burning, tingling or numbness     Objective:   Vasc: DP and PT pulses are palpable bilateral.  CFT is less than 3 seconds bilateral.  Skin temperature is warm to cool proximal to distal bilateral.      Neuro:  Light touch is intact to the foot bilateral.  Protective sensation is intact to the foot when tested with the 5.07 SWM bilateral.  There is no clonus noted.  The hallux is downgoing bilateral.      Derm: Nails 1-5 bilateral are thickened, elongated and crumbly with subungual debris. Skin is supple with normal texture and turgor noted.  Webspaces are clean, dry and intact bilateral.  There are no hyperkeratoses, ulcerations, verruca or other lesions noted.  Area of red, itchy area medial anterior left leg       Ortho: Muscle strength is 5/5 for all pedal groups tested.  Ankle joint, subtalar joint, 1st MPJ and lesser MPJ ROM is full and without pain or crepitus.  The foot type is rectus bilateral off weight bearing.  There are no structural deformities noted.    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      Toenails are debrided in length and thickness to avoid infection and for pain relief

## 2024-08-01 DIAGNOSIS — N18.4 CHRONIC KIDNEY DISEASE, STAGE 4 (SEVERE) (MULTI): Primary | ICD-10-CM

## 2024-08-16 ENCOUNTER — TELEPHONE (OUTPATIENT)
Dept: PEDIATRICS | Facility: CLINIC | Age: 79
End: 2024-08-16
Payer: MEDICARE

## 2024-08-16 NOTE — TELEPHONE ENCOUNTER
"Pt called stated \"went for pre-op appt cataract surgery  on tues 8/13  bp was low and pt was dehydrated, pt stated she took her bp meds before that appt. Please advise  "

## 2024-09-23 ENCOUNTER — TELEPHONE (OUTPATIENT)
Dept: PRIMARY CARE | Facility: CLINIC | Age: 79
End: 2024-09-23
Payer: MEDICARE

## 2024-09-23 DIAGNOSIS — J45.20 MILD INTERMITTENT REACTIVE AIRWAY DISEASE WITHOUT COMPLICATION (HHS-HCC): ICD-10-CM

## 2024-09-23 RX ORDER — ALBUTEROL SULFATE 90 UG/1
POWDER, METERED RESPIRATORY (INHALATION)
Qty: 0.65 G | Refills: 11 | Status: SHIPPED | OUTPATIENT
Start: 2024-09-23

## 2024-09-23 NOTE — TELEPHONE ENCOUNTER
Pt asking to speak to you. Her  tested + for covid.  ( I advised otc but she requested to talk to you )           Pt also declined an appt , and requested albuterol refill

## 2024-09-24 ENCOUNTER — TELEMEDICINE (OUTPATIENT)
Dept: PRIMARY CARE | Facility: CLINIC | Age: 79
End: 2024-09-24
Payer: MEDICARE

## 2024-09-24 ENCOUNTER — TELEPHONE (OUTPATIENT)
Dept: PRIMARY CARE | Facility: CLINIC | Age: 79
End: 2024-09-24

## 2024-09-24 DIAGNOSIS — U07.1 COVID-19: Primary | ICD-10-CM

## 2024-09-24 PROCEDURE — 1123F ACP DISCUSS/DSCN MKR DOCD: CPT | Performed by: NURSE PRACTITIONER

## 2024-09-24 PROCEDURE — 99213 OFFICE O/P EST LOW 20 MIN: CPT | Performed by: NURSE PRACTITIONER

## 2024-09-24 PROCEDURE — 1036F TOBACCO NON-USER: CPT | Performed by: NURSE PRACTITIONER

## 2024-09-24 PROCEDURE — 1160F RVW MEDS BY RX/DR IN RCRD: CPT | Performed by: NURSE PRACTITIONER

## 2024-09-24 PROCEDURE — 1159F MED LIST DOCD IN RCRD: CPT | Performed by: NURSE PRACTITIONER

## 2024-09-24 RX ORDER — RIVAROXABAN 2.5 MG/1
2.5 TABLET, FILM COATED ORAL 2 TIMES DAILY
COMMUNITY

## 2024-09-24 RX ORDER — CETIRIZINE HYDROCHLORIDE 10 MG/1
10 TABLET ORAL DAILY
Qty: 30 TABLET | Refills: 0 | Status: SHIPPED | OUTPATIENT
Start: 2024-09-24 | End: 2024-10-24

## 2024-09-24 RX ORDER — ALBUTEROL SULFATE 90 UG/1
2 INHALANT RESPIRATORY (INHALATION) EVERY 4 HOURS PRN
Qty: 8.5 G | Refills: 0 | Status: SHIPPED | OUTPATIENT
Start: 2024-09-24 | End: 2024-10-24

## 2024-09-24 RX ORDER — ASPIRIN 81 MG/1
81 TABLET ORAL DAILY
COMMUNITY

## 2024-09-24 ASSESSMENT — ENCOUNTER SYMPTOMS
ABDOMINAL PAIN: 0
ARTHRALGIAS: 1
FEVER: 0
HEADACHES: 1
SORE THROAT: 1
NAUSEA: 0
APPETITE CHANGE: 1
COUGH: 1
RHINORRHEA: 0
COUGH: 1
FATIGUE: 1
MYALGIAS: 1
SHORTNESS OF BREATH: 0
HEADACHES: 1
CHILLS: 1
CHEST TIGHTNESS: 0
VOMITING: 0
WHEEZING: 0
DIARRHEA: 0
FEVER: 0
SHORTNESS OF BREATH: 0
FATIGUE: 1

## 2024-09-24 NOTE — TELEPHONE ENCOUNTER
Pt called into the office stating that Lagevrio is $300 after the copay card. Patient says that this is very expensive for her. Pt is not short of breath at this time and doesn't have any difficulty breathing. Discussed that she is at high risk for severe COVID infection due to her co-morbidities. We would like to avoid the use of steroids as they increase her blood sugars.    Patient says that she will hold off on taking the medication for now. She will take it in the next few days if she feels like her symptoms worsen or she develops any shortness of breath. Pt aware that she must start the medication within five days of onset of symptoms. Dr. Traylor, pt's PCP aware and in agreement with plan of care.

## 2024-09-24 NOTE — PROGRESS NOTES
Subjective   Patient ID: Elma Hernandez is a 79 y.o. female who presents for Covid-19 Home Monitoring Phone Call ([phone]). Tested positive for COVID 09/23/2024.    Symptoms started on Sunday with cough and headache and congestion. Tested positive for COVID yesterday on an at home test. Pt is vaccinated against COVID but not with the most recent booster. Pt took tylenol and robitussin and it helped. Patient is not having any chest pain or difficulty breathing.          Review of Systems   Constitutional:  Positive for appetite change, chills and fatigue. Negative for fever.   HENT:  Positive for congestion, postnasal drip and sore throat. Negative for ear pain and rhinorrhea.    Respiratory:  Positive for cough. Negative for chest tightness, shortness of breath and wheezing.    Cardiovascular:  Negative for chest pain.   Gastrointestinal:  Negative for abdominal pain, diarrhea, nausea and vomiting.   Musculoskeletal:  Positive for myalgias.   Neurological:  Positive for headaches.     Objective   There were no vitals taken for this visit.    Physical Exam  Physical exam not done as this was a telephone only visit    Assessment/Plan   Problem List Items Addressed This Visit    None  Visit Diagnoses         Codes    COVID-19    -  Primary U07.1    Relevant Medications    molnupiravir (Lagevrio) capsule capsule    albuterol (ProAir HFA) 90 mcg/actuation inhaler    cetirizine (ZyrTEC) 10 mg tablet        This visit was completed via telephone. All issues as below were discussed and addressed but no physical exam was performed. If it was felt that the patient should be evaluated in the clinic then they were directed there. The patient verbally consented to the visit and confirmed that she is in the [facility] during the visit.    Approximately 25 minutes spent performing virtual video visit. Patient tested positive for COVID on an at home COVID test.  Patient declined the need to go to the ER at this time.      Pt would like to start an antiviral treatment for COVID.     Patient is not a candidate for Paxlovid due to her kidney function and her current medications.     Discussed treatment options available for COVID infection, reviewed risks and benefits of the medication prescribed, and all questions were answered. I made patient aware that this medication is not yet FDA approved and is currently only available under FDA Emergency Use Authorization. Patient reminded to stop taking the Lagevrio and seek emergency care should she develop any negative side effects from the medication; she agreed.     Pt to use inhaler every four to six hours as needed. Advised patient on use of humidifier and hot steam treatments. Discussed that patient is to drink plenty of fluids and stay well hydrated. Can take tylenol as needed for any fevers or discomfort. Discussed that patient is to go to the ER for any chest pain, difficulty breathing, shortness of breath or new/concerning symptoms; she agreed. Pt reminded to self quarantine for at least five days since symptoms started and until she is feeling better. she was advised to wear a mask for at least 10 days after quarantine when in public; she agreed.

## 2024-09-24 NOTE — PROGRESS NOTES
Subjective   Patient ID: Elma Hernandez is a 79 y.o. female who presents for Covid-19 Home Monitoring Video Visit.    HPI     Review of Systems   Constitutional:  Positive for fatigue. Negative for fever.   HENT:  Positive for congestion.    Respiratory:  Positive for cough. Negative for shortness of breath.    Musculoskeletal:  Positive for arthralgias.   Neurological:  Positive for headaches.       Objective   There were no vitals taken for this visit.    Physical Exam    Assessment/Plan   {Assess/PlanSmartLinks:[id number]}

## 2024-10-09 DIAGNOSIS — E11.65 TYPE 2 DIABETES MELLITUS WITH HYPERGLYCEMIA, WITH LONG-TERM CURRENT USE OF INSULIN: ICD-10-CM

## 2024-10-09 DIAGNOSIS — Z79.4 TYPE 2 DIABETES MELLITUS WITH HYPERGLYCEMIA, WITH LONG-TERM CURRENT USE OF INSULIN: ICD-10-CM

## 2024-10-10 RX ORDER — FLASH GLUCOSE SENSOR
KIT MISCELLANEOUS
Qty: 6 EACH | Refills: 1 | Status: SHIPPED | OUTPATIENT
Start: 2024-10-10

## 2024-10-18 ENCOUNTER — APPOINTMENT (OUTPATIENT)
Dept: PRIMARY CARE | Facility: CLINIC | Age: 79
End: 2024-10-18
Payer: MEDICARE

## 2024-10-18 VITALS
BODY MASS INDEX: 29.49 KG/M2 | RESPIRATION RATE: 14 BRPM | SYSTOLIC BLOOD PRESSURE: 138 MMHG | HEIGHT: 65 IN | TEMPERATURE: 97.3 F | DIASTOLIC BLOOD PRESSURE: 70 MMHG | OXYGEN SATURATION: 94 % | WEIGHT: 177 LBS | HEART RATE: 62 BPM

## 2024-10-18 DIAGNOSIS — R51.9 SINUS HEADACHE: ICD-10-CM

## 2024-10-18 DIAGNOSIS — Z79.4 TYPE 2 DIABETES MELLITUS WITHOUT COMPLICATION, WITH LONG-TERM CURRENT USE OF INSULIN (MULTI): ICD-10-CM

## 2024-10-18 DIAGNOSIS — R26.89 POOR BALANCE: ICD-10-CM

## 2024-10-18 DIAGNOSIS — R41.3 MEMORY CHANGE: ICD-10-CM

## 2024-10-18 DIAGNOSIS — Z00.00 PERIODIC HEALTH ASSESSMENT, GENERAL SCREENING, ADULT: ICD-10-CM

## 2024-10-18 DIAGNOSIS — E11.43 DIABETIC AUTONOMIC NEUROPATHY ASSOCIATED WITH TYPE 2 DIABETES MELLITUS (MULTI): ICD-10-CM

## 2024-10-18 DIAGNOSIS — I10 ESSENTIAL HYPERTENSION: Primary | ICD-10-CM

## 2024-10-18 DIAGNOSIS — E07.9 THYROID DISEASE: ICD-10-CM

## 2024-10-18 DIAGNOSIS — N18.30 CKD STAGE 3 SECONDARY TO DIABETES (MULTI): ICD-10-CM

## 2024-10-18 DIAGNOSIS — E11.22 CKD STAGE 3 SECONDARY TO DIABETES (MULTI): ICD-10-CM

## 2024-10-18 DIAGNOSIS — E11.9 TYPE 2 DIABETES MELLITUS WITHOUT COMPLICATION, WITH LONG-TERM CURRENT USE OF INSULIN (MULTI): ICD-10-CM

## 2024-10-18 PROCEDURE — 3078F DIAST BP <80 MM HG: CPT | Performed by: INTERNAL MEDICINE

## 2024-10-18 PROCEDURE — 1158F ADVNC CARE PLAN TLK DOCD: CPT | Performed by: INTERNAL MEDICINE

## 2024-10-18 PROCEDURE — 1160F RVW MEDS BY RX/DR IN RCRD: CPT | Performed by: INTERNAL MEDICINE

## 2024-10-18 PROCEDURE — 99214 OFFICE O/P EST MOD 30 MIN: CPT | Performed by: INTERNAL MEDICINE

## 2024-10-18 PROCEDURE — 1159F MED LIST DOCD IN RCRD: CPT | Performed by: INTERNAL MEDICINE

## 2024-10-18 PROCEDURE — 3075F SYST BP GE 130 - 139MM HG: CPT | Performed by: INTERNAL MEDICINE

## 2024-10-18 PROCEDURE — 1123F ACP DISCUSS/DSCN MKR DOCD: CPT | Performed by: INTERNAL MEDICINE

## 2024-10-18 RX ORDER — CILOSTAZOL 50 MG/1
50 TABLET ORAL 2 TIMES DAILY
COMMUNITY
Start: 2024-10-10

## 2024-10-18 RX ORDER — ERGOCALCIFEROL 1.25 MG/1
50000 CAPSULE ORAL WEEKLY
Qty: 12 CAPSULE | Refills: 0 | Status: SHIPPED | OUTPATIENT
Start: 2024-10-18 | End: 2025-01-10

## 2024-10-18 ASSESSMENT — ENCOUNTER SYMPTOMS
SHORTNESS OF BREATH: 0
COUGH: 0
DIARRHEA: 0
ABDOMINAL PAIN: 0
NAUSEA: 0
WHEEZING: 0
PALPITATIONS: 0
CONSTIPATION: 0

## 2024-10-18 NOTE — PROGRESS NOTES
"Subjective   Patient ID: Elma Hernandez is a 79 y.o. female who presents for Hypertension.    Present with .  Overall she has been doing well.  No new complaints.  Continues to have balance problems.  This seems to stem from her diabetic neuropathy.  She is willing to retry PT for this.    She has recurrent sinus HA/congestion with weather changes.    We reviewed and discussed her medications as well as her recent blood tests results.      Review of Systems   Respiratory:  Negative for cough, shortness of breath and wheezing.    Cardiovascular:  Negative for chest pain and palpitations.   Gastrointestinal:  Negative for abdominal pain, constipation, diarrhea and nausea.       Objective   /70 (BP Location: Left arm, Patient Position: Sitting, BP Cuff Size: Adult)   Pulse 62   Temp 36.3 °C (97.3 °F) (Tympanic)   Resp 14   Ht 1.651 m (5' 5\")   Wt 80.3 kg (177 lb)   SpO2 94%   BMI 29.45 kg/m²     Physical Exam  Vitals reviewed.   Constitutional:       Appearance: Normal appearance.   HENT:      Head: Normocephalic.   Cardiovascular:      Rate and Rhythm: Normal rate.   Pulmonary:      Effort: Pulmonary effort is normal.   Musculoskeletal:         General: Normal range of motion.   Neurological:      General: No focal deficit present.      Mental Status: She is alert.   Psychiatric:         Mood and Affect: Mood normal.         Assessment/Plan   Problem List Items Addressed This Visit             ICD-10-CM    CKD stage 3 secondary to diabetes (Multi) E11.22, N18.30    Relevant Orders    CBC    Comprehensive Metabolic Panel    Diabetic neuropathy (Multi) E11.40    Essential hypertension - Primary I10    Poor balance R26.89    Thyroid disease E07.9    Type 2 diabetes mellitus without complication, with long-term current use of insulin (Multi) E11.9, Z79.4    Relevant Orders    Lipid Panel    Thyroid Stimulating Hormone     Other Visit Diagnoses         Codes    Sinus headache     R51.9    Periodic " health assessment, general screening, adult     Z00.00    Relevant Medications    ergocalciferol (Vitamin D-2) 1.25 MG (38209 UT) capsule    Other Relevant Orders    Referral to Physical Therapy    CBC    Comprehensive Metabolic Panel    Lipid Panel    Thyroid Stimulating Hormone    Vitamin B12    Memory change     R41.3    Relevant Orders    Vitamin B12        We reviewed and discussed all of the above.    On the way out, her  mentioned something to me about concern about him noticing some memory issues.  Very slight.    We discussed labs results/CKD and the benefits of good hydration and avoidance of NSAIDs (ibuprofen, motrin, aleve, naproxen, etc).  Tylenol (acetaminophen is ok for aches and pain).   We discussed the benefits of low sugar and low carbohydrate diet (avoiding sugars, sweets, desserts, pop, juice, milk and minimizing foods such as breads, pasta/noodles, rice, cereal etc) discussed need to monitor for hypoglycemia.    Discussed sinus congestions and benefit from intranasal steroids.    We will see her back in 3-4 months - sooner if any issues.

## 2024-11-05 ENCOUNTER — OFFICE VISIT (OUTPATIENT)
Dept: PRIMARY CARE | Facility: CLINIC | Age: 79
End: 2024-11-05
Payer: MEDICARE

## 2024-11-05 VITALS
WEIGHT: 178 LBS | SYSTOLIC BLOOD PRESSURE: 160 MMHG | OXYGEN SATURATION: 94 % | BODY MASS INDEX: 29.66 KG/M2 | TEMPERATURE: 97.7 F | HEART RATE: 80 BPM | HEIGHT: 65 IN | DIASTOLIC BLOOD PRESSURE: 70 MMHG | RESPIRATION RATE: 14 BRPM

## 2024-11-05 DIAGNOSIS — I73.9 PAD (PERIPHERAL ARTERY DISEASE) (CMS-HCC): ICD-10-CM

## 2024-11-05 DIAGNOSIS — I50.43 CHF (CONGESTIVE HEART FAILURE), NYHA CLASS I, ACUTE ON CHRONIC, COMBINED: ICD-10-CM

## 2024-11-05 DIAGNOSIS — I10 ESSENTIAL HYPERTENSION: ICD-10-CM

## 2024-11-05 DIAGNOSIS — M79.89 SWELLING OF BOTH LOWER EXTREMITIES: Primary | ICD-10-CM

## 2024-11-05 PROCEDURE — 99214 OFFICE O/P EST MOD 30 MIN: CPT | Performed by: INTERNAL MEDICINE

## 2024-11-05 PROCEDURE — 3078F DIAST BP <80 MM HG: CPT | Performed by: INTERNAL MEDICINE

## 2024-11-05 PROCEDURE — 1159F MED LIST DOCD IN RCRD: CPT | Performed by: INTERNAL MEDICINE

## 2024-11-05 PROCEDURE — 3077F SYST BP >= 140 MM HG: CPT | Performed by: INTERNAL MEDICINE

## 2024-11-05 PROCEDURE — 1158F ADVNC CARE PLAN TLK DOCD: CPT | Performed by: INTERNAL MEDICINE

## 2024-11-05 PROCEDURE — 1036F TOBACCO NON-USER: CPT | Performed by: INTERNAL MEDICINE

## 2024-11-05 PROCEDURE — 1123F ACP DISCUSS/DSCN MKR DOCD: CPT | Performed by: INTERNAL MEDICINE

## 2024-11-05 RX ORDER — LATANOPROST 50 UG/ML
1 SOLUTION/ DROPS OPHTHALMIC
COMMUNITY
Start: 2024-10-06

## 2024-11-05 NOTE — PROGRESS NOTES
"Subjective   Patient ID: Elma Hernandez is a 79 y.o. female who presents for Edema (Left lower ext.).    Swelling actually in both feet L>R.  No SOB.  No injury.    Taking medications - including Xarelto, norvasc and Pletal.        Review of Systems   Cardiovascular:  Positive for leg swelling.       Objective   /70 (BP Location: Left arm, Patient Position: Sitting, BP Cuff Size: Adult)   Pulse 80   Temp 36.5 °C (97.7 °F) (Tympanic)   Resp 14   Ht 1.651 m (5' 5\")   Wt 80.7 kg (178 lb)   SpO2 94%   BMI 29.62 kg/m²     Physical Exam  Vitals reviewed.   Constitutional:       Appearance: Normal appearance.   HENT:      Head: Normocephalic.   Cardiovascular:      Rate and Rhythm: Normal rate.   Pulmonary:      Effort: Pulmonary effort is normal.   Musculoskeletal:         General: Normal range of motion.   Neurological:      General: No focal deficit present.      Mental Status: She is alert.   Psychiatric:         Mood and Affect: Mood normal.         Assessment/Plan   Problem List Items Addressed This Visit             ICD-10-CM    Essential hypertension I10     Other Visit Diagnoses         Codes    Swelling of both lower extremities    -  Primary M79.89    Relevant Orders    Transthoracic Echo (TTE) Complete    CHF (congestive heart failure), NYHA class I, acute on chronic, combined     I50.43    Relevant Orders    Transthoracic Echo (TTE) Complete    PAD (peripheral artery disease) (CMS-[facility])     I73.9        We discussed the above.    Long standing DM with PAD.  Likely underlying CAD.  Has cardiologist and seen recently by him.    Due to LE swelling and likely underlying heart disease/CHF we will proceed with Echocardiogram.    We discussed compression stockings and low salt diet.    It is also possible this is due to combination of Norvasc and Pletal - Pletal being just recently added.    Her BP fluctuates a lot, even at home.  There is likely underlying autonomic dysfunction.  We will continue to " monitor this.    Follow up as previously scheduled in December.  Sooner if any changes/worsening.

## 2024-11-08 DIAGNOSIS — E11.65 TYPE 2 DIABETES MELLITUS WITH HYPERGLYCEMIA, WITH LONG-TERM CURRENT USE OF INSULIN: ICD-10-CM

## 2024-11-08 DIAGNOSIS — Z79.4 TYPE 2 DIABETES MELLITUS WITH HYPERGLYCEMIA, WITH LONG-TERM CURRENT USE OF INSULIN: ICD-10-CM

## 2024-11-08 RX ORDER — INSULIN GLARGINE 100 [IU]/ML
INJECTION, SOLUTION SUBCUTANEOUS
Qty: 36 ML | Refills: 0 | Status: SHIPPED | OUTPATIENT
Start: 2024-11-08

## 2024-11-12 ENCOUNTER — TELEPHONE (OUTPATIENT)
Dept: PEDIATRICS | Facility: CLINIC | Age: 79
End: 2024-11-12

## 2024-11-12 ENCOUNTER — APPOINTMENT (OUTPATIENT)
Dept: ENDOCRINOLOGY | Facility: CLINIC | Age: 79
End: 2024-11-12
Payer: MEDICARE

## 2024-11-12 VITALS
WEIGHT: 181 LBS | BODY MASS INDEX: 30.16 KG/M2 | HEIGHT: 65 IN | SYSTOLIC BLOOD PRESSURE: 158 MMHG | DIASTOLIC BLOOD PRESSURE: 73 MMHG

## 2024-11-12 DIAGNOSIS — I10 ESSENTIAL HYPERTENSION: ICD-10-CM

## 2024-11-12 DIAGNOSIS — Z79.4 TYPE 2 DIABETES MELLITUS WITH HYPERGLYCEMIA, WITH LONG-TERM CURRENT USE OF INSULIN: Primary | ICD-10-CM

## 2024-11-12 DIAGNOSIS — E11.65 TYPE 2 DIABETES MELLITUS WITH HYPERGLYCEMIA, WITH LONG-TERM CURRENT USE OF INSULIN: Primary | ICD-10-CM

## 2024-11-12 DIAGNOSIS — Z97.8 USES SELF-APPLIED CONTINUOUS GLUCOSE MONITORING DEVICE: ICD-10-CM

## 2024-11-12 DIAGNOSIS — E78.2 HYPERLIPIDEMIA, MIXED: ICD-10-CM

## 2024-11-12 DIAGNOSIS — E11.42 DIABETIC POLYNEUROPATHY ASSOCIATED WITH TYPE 2 DIABETES MELLITUS (MULTI): ICD-10-CM

## 2024-11-12 LAB
POC FINGERSTICK BLOOD GLUCOSE: 163 MG/DL (ref 70–100)
POC HEMOGLOBIN A1C: 6.6 % (ref 4.2–6.5)

## 2024-11-12 PROCEDURE — 1123F ACP DISCUSS/DSCN MKR DOCD: CPT | Performed by: HOSPITALIST

## 2024-11-12 PROCEDURE — 83036 HEMOGLOBIN GLYCOSYLATED A1C: CPT | Performed by: HOSPITALIST

## 2024-11-12 PROCEDURE — 1159F MED LIST DOCD IN RCRD: CPT | Performed by: HOSPITALIST

## 2024-11-12 PROCEDURE — 3077F SYST BP >= 140 MM HG: CPT | Performed by: HOSPITALIST

## 2024-11-12 PROCEDURE — 95251 CONT GLUC MNTR ANALYSIS I&R: CPT | Performed by: HOSPITALIST

## 2024-11-12 PROCEDURE — 99214 OFFICE O/P EST MOD 30 MIN: CPT | Performed by: HOSPITALIST

## 2024-11-12 PROCEDURE — 82962 GLUCOSE BLOOD TEST: CPT | Performed by: HOSPITALIST

## 2024-11-12 PROCEDURE — 3078F DIAST BP <80 MM HG: CPT | Performed by: HOSPITALIST

## 2024-11-12 RX ORDER — GABAPENTIN 100 MG/1
100 CAPSULE ORAL 2 TIMES DAILY
Qty: 180 CAPSULE | Refills: 3 | Status: SHIPPED | OUTPATIENT
Start: 2024-11-12 | End: 2025-11-12

## 2024-11-12 RX ORDER — AMLODIPINE BESYLATE 5 MG/1
5 TABLET ORAL DAILY
Qty: 90 TABLET | Refills: 3 | Status: SHIPPED | OUTPATIENT
Start: 2024-11-12 | End: 2025-11-12

## 2024-11-12 ASSESSMENT — ENCOUNTER SYMPTOMS
NERVOUS/ANXIOUS: 0
ARTHRALGIAS: 0
SLEEP DISTURBANCE: 0
NAUSEA: 0
CONSTIPATION: 0
HEADACHES: 0
AGITATION: 0
PHOTOPHOBIA: 0
TREMORS: 0
SORE THROAT: 0
DIARRHEA: 0
VOICE CHANGE: 0
SHORTNESS OF BREATH: 0
CONSTITUTIONAL NEGATIVE: 1
FREQUENCY: 0
VOMITING: 0
CHEST TIGHTNESS: 0
EYE ITCHING: 0
ABDOMINAL DISTENTION: 0
ABDOMINAL PAIN: 0
DYSURIA: 0
LIGHT-HEADEDNESS: 0
PALPITATIONS: 0
TROUBLE SWALLOWING: 0

## 2024-11-12 NOTE — PROGRESS NOTES
Subjective   Patient ID: Elma Hernandez is a 79 y.o. female who presents for Diabetes (dx dm: 1990/[facility] DM: father /PCP: Filiatraut /Podiatrist: Sukhdeepne /Eye exam: every 2 month / /Patient testing glucose 4 times daily, with freestyle jose manuel 2 READER. due to fluctuating blood glucose, hypoglycemia and 4 insulin injections. /Patient is injecting meal time insulin 3 times daily based on glucose reading and sliding scale. Pt adhering and benefiting from cgm treatment./7/16/2024 hga1c 6.6% ).  Lab Results   Component Value Date    HGBA1C 6.6 (A) 11/12/2024      HPI  See assessment and plan  Review of Systems   Constitutional: Negative.    HENT:  Negative for sore throat, trouble swallowing and voice change.    Eyes:  Negative for photophobia, itching and visual disturbance.   Respiratory:  Negative for chest tightness and shortness of breath.    Cardiovascular:  Positive for leg swelling. Negative for chest pain and palpitations.   Gastrointestinal:  Negative for abdominal distention, abdominal pain, constipation, diarrhea, nausea and vomiting.   Endocrine: Negative for cold intolerance, heat intolerance and polyuria.   Genitourinary:  Negative for dysuria and frequency.   Musculoskeletal:  Negative for arthralgias.   Skin:  Negative for pallor.   Allergic/Immunologic: Negative for environmental allergies.   Neurological:  Negative for tremors, light-headedness and headaches.   Psychiatric/Behavioral:  Negative for agitation and sleep disturbance. The patient is not nervous/anxious.        Objective   Physical Exam  Constitutional:       Appearance: Normal appearance.   HENT:      Head: Normocephalic.      Nose: Nose normal.      Mouth/Throat:      Mouth: Mucous membranes are moist.   Eyes:      Extraocular Movements: Extraocular movements intact.   Cardiovascular:      Rate and Rhythm: Normal rate.   Pulmonary:      Effort: Pulmonary effort is normal. No respiratory distress.   Abdominal:      General: There is no  "distension.   Musculoskeletal:         General: Normal range of motion.      Cervical back: Normal range of motion and neck supple.   Skin:     General: Skin is warm and dry.   Neurological:      Mental Status: She is alert and oriented to person, place, and time.   Psychiatric:         Mood and Affect: Mood normal.      Visit Vitals  /73   Ht 1.651 m (5' 5\")   Wt 82.1 kg (181 lb)   BMI 30.12 kg/m²   OB Status Hysterectomy   Smoking Status Never   BSA 1.94 m²        Assessment/Plan   Diagnoses and all orders for this visit:  Type 2 diabetes mellitus with hyperglycemia, with long-term current use of insulin  -     POCT glycosylated hemoglobin (Hb A1C) manually resulted  -     POCT glucose manually resulted  -     dulaglutide (Trulicity) 0.75 mg/0.5 mL pen injector; Inject 0.75 mg under the skin 1 (one) time per week.  Essential hypertension  Hyperlipidemia, mixed  Uses self-applied continuous glucose monitoring device                 T2 DM , controlled complicated with neuropathy and retinopathy ( getting monthly injections )    h/o CAD     current regimen:   Humalog 14-14-14-0 +scale TID 2 units for every 50 over 200  Lantus 0-0-0-38     Stop Ozempic November 2023 due to severe GI issues even on 0.25 mg dose. [facility]   Has history of yeast infection    A1c at goal   Personally reviewed CGM jose manuel 2 and interpreted the results  active 70 % , TIR 71 % , 4 % low BG (in the past it was 8%), takes humalog after eating        Interval h/o : Cataract surgery in past  Has leg swelling       PLAN :   Discussed GLP-1 agonist again.  She is willing to try.  She understand risks including nausea, vomiting, dehydration, pancreatitis, cholecystitis, constipation  Start Trulicity 0.75 mg once weekly  If trulicity is covered then decrease lantus to 34 units and humalog to 10 units plus scale      If trulicity not covered then no change in insulin doses   IF SUGAR < 150 AT BEDTIME " HAVE 2 CRACKERS AND TBSP PEANUT BUTTER      -We discussed trying Trulicity next visit.  Given shortage of Trulicity will hold off at this time.  - hypertG on statin and fenofibrate.     also has retinopathy,      CKD : has started following Dr. Acosta       PAD : follows vascular       HyeprTG: familial    on statin and fenofibrate.   see above. not on vascepa , if TG high will add vascepa , repeat NV     # Leg swelling: Follow-up primary care physician. ?  Amlodipine     RTC 4-5 m      - aug 25 - 50 yrs of marriage.     on ozempic  went to [place] in sept 2022   kids- 3 kids- 8 GK , 3 are from previous partner. 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   live in [facility] [place]    [place]    [facility] and [facility]   Having issues with anxiety

## 2024-11-12 NOTE — TELEPHONE ENCOUNTER
Rx Refill Request Telephone Encounter    Name:  Elma Hernandez  :  [id number]  Medication Name:  amLODIPine (Norvasc) 5 mg tablet   Medication Name:  gabapentin (Neurontin) 100 mg capsule   Specific Pharmacy location:  [id number] [address]   Date of last appointment:  24  Date of next appointment:  24  Best number to reach patient:  [phone]

## 2024-11-12 NOTE — PATIENT INSTRUCTIONS
If trulicity is covered then decrease lantus to 34 units and humalog to 10 units plus scale      If trulicity not covered then no change in insulin doses

## 2024-12-06 ENCOUNTER — HOSPITAL ENCOUNTER (OUTPATIENT)
Dept: RADIOLOGY | Facility: CLINIC | Age: 79
Discharge: HOME | End: 2024-12-06
Payer: MEDICARE

## 2024-12-06 ENCOUNTER — APPOINTMENT (OUTPATIENT)
Dept: PODIATRY | Facility: CLINIC | Age: 79
End: 2024-12-06
Payer: MEDICARE

## 2024-12-06 DIAGNOSIS — E10.43 DIABETIC AUTONOMIC NEUROPATHY ASSOCIATED WITH TYPE 1 DIABETES MELLITUS (MULTI): Primary | ICD-10-CM

## 2024-12-06 DIAGNOSIS — M79.675 PAIN IN TOES OF BOTH FEET: ICD-10-CM

## 2024-12-06 DIAGNOSIS — E78.1 TYPE 2 DIABETES MELLITUS WITH HYPERTRIGLYCERIDEMIA (MULTI): ICD-10-CM

## 2024-12-06 DIAGNOSIS — E10.43 DIABETIC AUTONOMIC NEUROPATHY ASSOCIATED WITH TYPE 1 DIABETES MELLITUS (MULTI): ICD-10-CM

## 2024-12-06 DIAGNOSIS — M14.672 CHARCOT'S JOINT OF LEFT ANKLE: ICD-10-CM

## 2024-12-06 DIAGNOSIS — M79.674 PAIN IN TOES OF BOTH FEET: ICD-10-CM

## 2024-12-06 DIAGNOSIS — E11.69 TYPE 2 DIABETES MELLITUS WITH HYPERTRIGLYCERIDEMIA (MULTI): ICD-10-CM

## 2024-12-06 DIAGNOSIS — B35.1 ONYCHOMYCOSIS: ICD-10-CM

## 2024-12-06 PROCEDURE — 1159F MED LIST DOCD IN RCRD: CPT | Performed by: PODIATRIST

## 2024-12-06 PROCEDURE — 99213 OFFICE O/P EST LOW 20 MIN: CPT | Performed by: PODIATRIST

## 2024-12-06 PROCEDURE — 1160F RVW MEDS BY RX/DR IN RCRD: CPT | Performed by: PODIATRIST

## 2024-12-06 PROCEDURE — 1123F ACP DISCUSS/DSCN MKR DOCD: CPT | Performed by: PODIATRIST

## 2024-12-06 PROCEDURE — 73610 X-RAY EXAM OF ANKLE: CPT | Mod: LT

## 2024-12-06 NOTE — PROGRESS NOTES
History Of Present Illness  Elma Hernandez is a 79 y.o. female presenting for diabetic foot care. Patient complains with painful elongated nails. Patient complains of left foot swelling/pain, possible charcot foot.    PCP Bennie Traylor DO  Last visit 11/5/24     Past Medical History  She has a past medical history of Contusion of unspecified part of head, initial encounter, Hyperlipidemia, Hypertension, Other chest pain (03/05/2018), Personal history of diseases of the skin and subcutaneous tissue (08/16/2019), Personal history of malignant neoplasm of other parts of uterus, Personal history of other diseases of the respiratory system (08/21/2020), Personal history of other diseases of the respiratory system (09/13/2020), Personal history of other endocrine, nutritional and metabolic disease, Renal disorder, Type 2 diabetes mellitus with other diabetic neurological complication (11/30/2020), Type 2 diabetes mellitus with other skin complications (03/18/2022), and Type 2 diabetes mellitus with other skin ulcer (CODE).    Surgical History  She has a past surgical history that includes Tonsillectomy (11/11/2014); Cholecystectomy (11/11/2014); Hysterectomy (11/11/2014); Other surgical history (06/11/2019); Other surgical history (06/11/2019); Other surgical history (06/11/2019); MR angio head wo IV contrast (1/29/2021); and MR angio head wo IV contrast (11/7/2023).     Social History  She reports that she has never smoked. She has never used smokeless tobacco. She reports that she does not currently use alcohol after a past usage of about 1.0 standard drink of alcohol per week. She reports that she does not use drugs.    Family History  Family History   Problem Relation Name Age of Onset    Other (Cardiac disorder) Mother      Diabetes Mother      Hypertension Mother      Lung disease Mother      Kidney cancer Mother      Diabetes Father      Other (Cardiac disorder) Father          Allergies  Latex and  Sulfamethoxazole-trimethoprim    Medications  Current Outpatient Medications   Medication Sig Dispense Refill    albuterol (ProAir RespiClick) 90 mcg/actuation aerosol powdr breath activated inhaler INHALE 2 PUFFS Every 4 hours PRN 0.65 g 11    amLODIPine (Norvasc) 5 mg tablet Take 1 tablet (5 mg) by mouth once daily. 90 tablet 3    aspirin 81 mg EC tablet Take 1 tablet (81 mg) by mouth once daily.      blood sugar diagnostic (Accu-Chek Ronda Plus test strp) strip       cilostazol (Pletal) 50 mg tablet Take 1 tablet (50 mg) by mouth twice a day.      dulaglutide (Trulicity) 0.75 mg/0.5 mL pen injector Inject 0.75 mg under the skin 1 (one) time per week. 12 each 3    ergocalciferol (Vitamin D-2) 1.25 MG (24427 UT) capsule Take 1 capsule (50,000 Units) by mouth 1 (one) time per week. 12 capsule 0    flash glucose sensor kit (FreeStyle Yael 2 Sensor) kit Use as directed. Change every 14 days 6 each 1    fluticasone (Flonase) 50 mcg/actuation nasal spray Administer 2 sprays into each nostril 2 times a day.      gabapentin (Neurontin) 100 mg capsule Take 1 capsule (100 mg) by mouth 2 times a day. 180 capsule 3    insulin glargine (Lantus Solostar U-100 Insulin) 100 unit/mL (3 mL) pen INJECT 40 UNITS SUBCUTANEOUSLY EVERY NIGHT (Patient taking differently: INJECT 38 UNITS SUBCUTANEOUSLY EVERY NIGHT) 36 mL 0    insulin lispro (HumaLOG) 100 unit/mL injection 12-12-12-0 +scale TID 2 units for every 50 over 200 18 mL 3    latanoprost (Xalatan) 0.005 % ophthalmic solution Administer 1 drop into both eyes.      magnesium oxide,aspartate,citr (Triple Magnesium Complex) 400 mg magnesium capsule TAKE 3 CAPSULE Daily      Nephro-Lselye 0.8 mg tablet Take 1 tablet by mouth early in the morning..      nortriptyline (Pamelor) 10 mg capsule Take 3 capsules (30 mg) by mouth once daily at bedtime. 90 capsule 5    sertraline (Zoloft) 100 mg tablet Take 2 tablets (200 mg) by mouth once daily. 180 tablet 3    Xarelto 2.5 mg tablet Take 1  tablet (2.5 mg) by mouth 2 times a day.      atorvastatin (Lipitor) 40 mg tablet Take 1 tablet (40 mg) by mouth once daily. 90 tablet 3    carvedilol (Coreg) 12.5 mg tablet Take 1 tablet (12.5 mg) by mouth 2 times a day. 180 tablet 3    cetirizine (ZyrTEC) 10 mg tablet Take 1 tablet (10 mg) by mouth once daily. 90 tablet 3    pantoprazole (ProtoNix) 20 mg EC tablet Take 1 tablet (20 mg) by mouth once daily.       No current facility-administered medications for this visit.       Review of Systems    REVIEW OF SYSTEMS  GENERAL:  Negative for malaise, significant weight loss, fever  CARDIOVASCULAR: leg swelling   MUSCULOSKELETAL:  Negative for joint pain or swelling, back pain, and muscle pain.  SKIN:  Negative for lesions, rash, and itching  PSYCH:  Negative for sleep disturbance, mood disorder and recent psychosocial stressors  NEURO: Negative, denies any burning, tingling or numbness     Objective:   Vasc: DP and PT pulses are palpable bilateral.  CFT is less than 3 seconds bilateral.  Skin temperature is warm to cool proximal to distal bilateral.      Neuro:  Light touch is intact to the foot bilateral.  Protective sensation is intact to the foot when tested with the 5.07 SWM bilateral.  There is no clonus noted.  The hallux is downgoing bilateral.      Derm: Nails 1-5 bilateral are thickened, elongated and crumbly with subungual debris. Skin is supple with normal texture and turgor noted.  Webspaces are clean, dry and intact bilateral.  There are no hyperkeratoses, ulcerations, verruca or other lesions noted.  Area of red, itchy area medial anterior left leg       Ortho: Patient has a chronic Charcot foot of the left.  Her left ankle is edematous but not erythematous.  There is no calor.  Assessment/Plan     Diabetes.  Left ankle swelling evaluate for acute charcot foot /ankle changes    Painful nail mycosis  Toenails are debrided in length and thickness to avoid infection and for pain relief       I have a low  suspicion for acute Charcot.  Will get an x-ray and evaluate patient's left ankle

## 2024-12-18 DIAGNOSIS — G47.00 INSOMNIA, UNSPECIFIED TYPE: ICD-10-CM

## 2024-12-18 RX ORDER — NORTRIPTYLINE HYDROCHLORIDE 10 MG/1
30 CAPSULE ORAL NIGHTLY
Qty: 90 CAPSULE | Refills: 0 | Status: SHIPPED | OUTPATIENT
Start: 2024-12-18 | End: 2024-12-20 | Stop reason: SDUPTHER

## 2024-12-20 ENCOUNTER — APPOINTMENT (OUTPATIENT)
Dept: PRIMARY CARE | Facility: CLINIC | Age: 79
End: 2024-12-20
Payer: MEDICARE

## 2024-12-20 VITALS
WEIGHT: 175 LBS | HEART RATE: 88 BPM | BODY MASS INDEX: 29.16 KG/M2 | HEIGHT: 65 IN | OXYGEN SATURATION: 97 % | DIASTOLIC BLOOD PRESSURE: 80 MMHG | RESPIRATION RATE: 14 BRPM | TEMPERATURE: 97.9 F | SYSTOLIC BLOOD PRESSURE: 130 MMHG

## 2024-12-20 DIAGNOSIS — B35.6 TINEA CRURIS: ICD-10-CM

## 2024-12-20 DIAGNOSIS — I10 ESSENTIAL HYPERTENSION: ICD-10-CM

## 2024-12-20 DIAGNOSIS — R60.0 LOWER EXTREMITY EDEMA: Primary | ICD-10-CM

## 2024-12-20 DIAGNOSIS — G47.00 INSOMNIA, UNSPECIFIED TYPE: ICD-10-CM

## 2024-12-20 DIAGNOSIS — R26.89 BALANCE PROBLEM: ICD-10-CM

## 2024-12-20 PROCEDURE — 1159F MED LIST DOCD IN RCRD: CPT | Performed by: INTERNAL MEDICINE

## 2024-12-20 PROCEDURE — 1160F RVW MEDS BY RX/DR IN RCRD: CPT | Performed by: INTERNAL MEDICINE

## 2024-12-20 PROCEDURE — 1158F ADVNC CARE PLAN TLK DOCD: CPT | Performed by: INTERNAL MEDICINE

## 2024-12-20 PROCEDURE — 1123F ACP DISCUSS/DSCN MKR DOCD: CPT | Performed by: INTERNAL MEDICINE

## 2024-12-20 PROCEDURE — 1036F TOBACCO NON-USER: CPT | Performed by: INTERNAL MEDICINE

## 2024-12-20 PROCEDURE — 3079F DIAST BP 80-89 MM HG: CPT | Performed by: INTERNAL MEDICINE

## 2024-12-20 PROCEDURE — 3075F SYST BP GE 130 - 139MM HG: CPT | Performed by: INTERNAL MEDICINE

## 2024-12-20 PROCEDURE — 99214 OFFICE O/P EST MOD 30 MIN: CPT | Performed by: INTERNAL MEDICINE

## 2024-12-20 RX ORDER — BENAZEPRIL HYDROCHLORIDE 5 MG/1
1 TABLET ORAL
COMMUNITY
Start: 2024-11-19

## 2024-12-20 RX ORDER — NYSTATIN 100000 U/G
CREAM TOPICAL 2 TIMES DAILY
Qty: 30 G | Refills: 0 | Status: SHIPPED | OUTPATIENT
Start: 2024-12-20 | End: 2024-12-27

## 2024-12-20 RX ORDER — NORTRIPTYLINE HYDROCHLORIDE 10 MG/1
30 CAPSULE ORAL NIGHTLY
Qty: 90 CAPSULE | Refills: 0 | Status: SHIPPED | OUTPATIENT
Start: 2024-12-20

## 2024-12-20 ASSESSMENT — ENCOUNTER SYMPTOMS
DIARRHEA: 0
NAUSEA: 0
COUGH: 0
ABDOMINAL PAIN: 0
CONSTIPATION: 0
WHEEZING: 0
SHORTNESS OF BREATH: 0
PALPITATIONS: 0

## 2024-12-20 NOTE — PROGRESS NOTES
"Subjective   Patient ID: Elma Hernandez is a 79 y.o. female who presents for Hypertension.    Overall feeling pretty well.  She does have multiple chronic issues that are fairly stable.  She continues to balance issues and she would like to reenter PT.  She continues to have sleep difficulties from time to time.  She admits to taking naps during the day and not being very physically active.  She has got chronic lower extremity edema and does not regularly wear compression stockings.  She denies any recent issues of chest pain, shortness of breath or dizzy spells.    Review of Systems   Respiratory:  Negative for cough, shortness of breath and wheezing.    Cardiovascular:  Negative for chest pain and palpitations.   Gastrointestinal:  Negative for abdominal pain, constipation, diarrhea and nausea.     Objective   /80 (BP Location: Left arm, Patient Position: Sitting, BP Cuff Size: Adult)   Pulse 88   Temp 36.6 °C (97.9 °F) (Tympanic)   Resp 14   Ht 1.651 m (5' 5\")   Wt 79.4 kg (175 lb)   SpO2 97%   BMI 29.12 kg/m²     Physical Exam  Vitals reviewed.   Constitutional:       Appearance: Normal appearance.   HENT:      Head: Normocephalic.   Cardiovascular:      Rate and Rhythm: Normal rate.   Pulmonary:      Effort: Pulmonary effort is normal.      Breath sounds: Normal breath sounds.   Musculoskeletal:         General: Normal range of motion.   Neurological:      General: No focal deficit present.      Mental Status: She is alert.   Psychiatric:         Mood and Affect: Mood normal.         Assessment/Plan   Problem List Items Addressed This Visit             ICD-10-CM    Essential hypertension I10     Other Visit Diagnoses         Codes    Lower extremity edema    -  Primary R60.0    Tinea cruris     B35.6    Relevant Medications    nystatin (Mycostatin) cream    Insomnia, unspecified type     G47.00    Relevant Medications    nortriptyline (Pamelor) 10 mg capsule    Balance problem     R26.89    " Relevant Orders    Referral to Physical Therapy        We reviewed and discussed the above.  We discussed her current medications as well as her most recent blood test.  Chronic kidney disease which is stable.  Lower extremity edema which we stressed the need for improved diet higher in protein as well as more regular use of compression stockings.  We cautioned her with the regular use of diuretics with her chronic kidney disease.  Will reenter in physical therapy for her balance.  Will restart nystatin cream for her tinea cruris.  Follow-up in 4 to 6 months, sooner if any issues

## 2025-01-11 DIAGNOSIS — I10 ESSENTIAL HYPERTENSION: ICD-10-CM

## 2025-01-13 ENCOUNTER — TELEPHONE (OUTPATIENT)
Dept: PRIMARY CARE | Facility: CLINIC | Age: 80
End: 2025-01-13
Payer: MEDICARE

## 2025-01-13 DIAGNOSIS — I10 ESSENTIAL HYPERTENSION: ICD-10-CM

## 2025-01-13 RX ORDER — CARVEDILOL 12.5 MG/1
12.5 TABLET ORAL 2 TIMES DAILY
Qty: 180 TABLET | Refills: 0 | Status: SHIPPED | OUTPATIENT
Start: 2025-01-13

## 2025-01-13 RX ORDER — CARVEDILOL 12.5 MG/1
12.5 TABLET ORAL 2 TIMES DAILY
Qty: 180 TABLET | Refills: 0 | Status: SHIPPED | OUTPATIENT
Start: 2025-01-13 | End: 2025-01-13 | Stop reason: SDUPTHER

## 2025-01-13 NOTE — TELEPHONE ENCOUNTER
Needs refilled:    carvedilol (Coreg) 12.5 mg tablet   [id number] - [place], [place] - [address] [id number]  Phone: [phone]  Fax: [phone]

## 2025-01-17 ENCOUNTER — TELEPHONE (OUTPATIENT)
Dept: PODIATRY | Facility: CLINIC | Age: 80
End: 2025-01-17
Payer: MEDICARE

## 2025-01-17 NOTE — TELEPHONE ENCOUNTER
Elma called and said she discussed with her nephrologist the Ozempic and trulicity,  Her Nephrologist said Ozempic would be the best choice.  Is this something you want to discuss with her at her next appointment in March or prescribe for her now?  Thank you

## 2025-01-20 ENCOUNTER — TELEPHONE (OUTPATIENT)
Dept: PRIMARY CARE | Facility: CLINIC | Age: 80
End: 2025-01-20
Payer: MEDICARE

## 2025-01-20 DIAGNOSIS — Z79.4 TYPE 2 DIABETES MELLITUS WITHOUT COMPLICATION, WITH LONG-TERM CURRENT USE OF INSULIN (MULTI): Primary | ICD-10-CM

## 2025-01-20 DIAGNOSIS — E11.9 TYPE 2 DIABETES MELLITUS WITHOUT COMPLICATION, WITH LONG-TERM CURRENT USE OF INSULIN (MULTI): Primary | ICD-10-CM

## 2025-01-20 NOTE — TELEPHONE ENCOUNTER
Needs refilled:    semaglutide (Ozempic) 1 mg/dose (4 mg/3 mL) pen injector     Patient stated that this was something she was taking in the past and had concerns about but after seeing one of her other DrLee Ann's she feels better about taking it and wants to continue - please advise   [id number] - [place], [place] - [address] [place] [id number]  Phone: [phone]  Fax: [phone]

## 2025-01-24 DIAGNOSIS — G47.00 INSOMNIA, UNSPECIFIED TYPE: ICD-10-CM

## 2025-01-25 RX ORDER — NORTRIPTYLINE HYDROCHLORIDE 10 MG/1
CAPSULE ORAL
Qty: 90 CAPSULE | Refills: 0 | Status: SHIPPED | OUTPATIENT
Start: 2025-01-25

## 2025-01-30 ENCOUNTER — TELEPHONE (OUTPATIENT)
Dept: PRIMARY CARE | Facility: CLINIC | Age: 80
End: 2025-01-30
Payer: MEDICARE

## 2025-01-30 DIAGNOSIS — G47.00 INSOMNIA, UNSPECIFIED TYPE: ICD-10-CM

## 2025-01-30 RX ORDER — NORTRIPTYLINE HYDROCHLORIDE 10 MG/1
30 CAPSULE ORAL NIGHTLY
Qty: 270 CAPSULE | Refills: 3 | Status: SHIPPED | OUTPATIENT
Start: 2025-01-30 | End: 2025-01-31 | Stop reason: SDUPTHER

## 2025-01-31 ENCOUNTER — OFFICE VISIT (OUTPATIENT)
Dept: PRIMARY CARE | Facility: CLINIC | Age: 80
End: 2025-01-31
Payer: MEDICARE

## 2025-01-31 VITALS
RESPIRATION RATE: 14 BRPM | TEMPERATURE: 97.9 F | HEIGHT: 65 IN | HEART RATE: 90 BPM | DIASTOLIC BLOOD PRESSURE: 80 MMHG | WEIGHT: 176 LBS | BODY MASS INDEX: 29.32 KG/M2 | SYSTOLIC BLOOD PRESSURE: 130 MMHG | OXYGEN SATURATION: 97 %

## 2025-01-31 DIAGNOSIS — G47.00 INSOMNIA, UNSPECIFIED TYPE: ICD-10-CM

## 2025-01-31 DIAGNOSIS — Z00.00 WELLNESS EXAMINATION: Primary | ICD-10-CM

## 2025-01-31 DIAGNOSIS — E11.9 TYPE 2 DIABETES MELLITUS WITHOUT COMPLICATION, WITH LONG-TERM CURRENT USE OF INSULIN (MULTI): ICD-10-CM

## 2025-01-31 DIAGNOSIS — I12.0 HYPERTENSIVE CHRONIC KIDNEY DISEASE WITH STAGE 5 CHRONIC KIDNEY DISEASE OR END STAGE RENAL DISEASE: ICD-10-CM

## 2025-01-31 DIAGNOSIS — K21.9 GASTROESOPHAGEAL REFLUX DISEASE WITHOUT ESOPHAGITIS: ICD-10-CM

## 2025-01-31 DIAGNOSIS — E78.2 HYPERLIPIDEMIA, MIXED: ICD-10-CM

## 2025-01-31 DIAGNOSIS — L97.529 ULCER OF LEFT FOOT, UNSPECIFIED ULCER STAGE (MULTI): ICD-10-CM

## 2025-01-31 DIAGNOSIS — E78.1 FAMILIAL HYPERTRIGLYCERIDEMIA: ICD-10-CM

## 2025-01-31 DIAGNOSIS — Z79.4 TYPE 2 DIABETES MELLITUS WITHOUT COMPLICATION, WITH LONG-TERM CURRENT USE OF INSULIN (MULTI): ICD-10-CM

## 2025-01-31 DIAGNOSIS — Z00.00 PERIODIC HEALTH ASSESSMENT, GENERAL SCREENING, ADULT: ICD-10-CM

## 2025-01-31 DIAGNOSIS — J30.9 ALLERGIC RHINITIS, UNSPECIFIED SEASONALITY, UNSPECIFIED TRIGGER: ICD-10-CM

## 2025-01-31 DIAGNOSIS — J32.9 SINUSITIS, UNSPECIFIED CHRONICITY, UNSPECIFIED LOCATION: ICD-10-CM

## 2025-01-31 DIAGNOSIS — E11.3512 PROLIFERATIVE DIABETIC RETINOPATHY OF LEFT EYE WITH MACULAR EDEMA ASSOCIATED WITH TYPE 2 DIABETES MELLITUS: ICD-10-CM

## 2025-01-31 DIAGNOSIS — E07.9 THYROID DISEASE: ICD-10-CM

## 2025-01-31 DIAGNOSIS — E11.22 CKD STAGE 3 SECONDARY TO DIABETES (MULTI): ICD-10-CM

## 2025-01-31 DIAGNOSIS — N18.30 CKD STAGE 3 SECONDARY TO DIABETES (MULTI): ICD-10-CM

## 2025-01-31 PROCEDURE — 1036F TOBACCO NON-USER: CPT | Performed by: INTERNAL MEDICINE

## 2025-01-31 PROCEDURE — 1158F ADVNC CARE PLAN TLK DOCD: CPT | Performed by: INTERNAL MEDICINE

## 2025-01-31 PROCEDURE — G0439 PPPS, SUBSEQ VISIT: HCPCS | Performed by: INTERNAL MEDICINE

## 2025-01-31 PROCEDURE — 99397 PER PM REEVAL EST PAT 65+ YR: CPT | Performed by: INTERNAL MEDICINE

## 2025-01-31 PROCEDURE — 3079F DIAST BP 80-89 MM HG: CPT | Performed by: INTERNAL MEDICINE

## 2025-01-31 PROCEDURE — 1170F FXNL STATUS ASSESSED: CPT | Performed by: INTERNAL MEDICINE

## 2025-01-31 PROCEDURE — 1123F ACP DISCUSS/DSCN MKR DOCD: CPT | Performed by: INTERNAL MEDICINE

## 2025-01-31 PROCEDURE — 1159F MED LIST DOCD IN RCRD: CPT | Performed by: INTERNAL MEDICINE

## 2025-01-31 PROCEDURE — 1160F RVW MEDS BY RX/DR IN RCRD: CPT | Performed by: INTERNAL MEDICINE

## 2025-01-31 PROCEDURE — 3075F SYST BP GE 130 - 139MM HG: CPT | Performed by: INTERNAL MEDICINE

## 2025-01-31 RX ORDER — CETIRIZINE HYDROCHLORIDE 10 MG/1
10 TABLET ORAL DAILY
Qty: 90 TABLET | Refills: 3 | Status: SHIPPED | OUTPATIENT
Start: 2025-01-31 | End: 2026-01-31

## 2025-01-31 RX ORDER — CETIRIZINE HYDROCHLORIDE 10 MG/1
10 TABLET ORAL DAILY
Qty: 90 TABLET | Refills: 3 | Status: CANCELLED | OUTPATIENT
Start: 2025-01-31 | End: 2026-01-31

## 2025-01-31 RX ORDER — NORTRIPTYLINE HYDROCHLORIDE 10 MG/1
30 CAPSULE ORAL NIGHTLY
Qty: 270 CAPSULE | Refills: 3 | Status: SHIPPED | OUTPATIENT
Start: 2025-01-31 | End: 2026-01-31

## 2025-01-31 RX ORDER — AZITHROMYCIN 250 MG/1
TABLET, FILM COATED ORAL
Qty: 6 TABLET | Refills: 0 | Status: SHIPPED | OUTPATIENT
Start: 2025-01-31 | End: 2025-02-05

## 2025-01-31 RX ORDER — PANTOPRAZOLE SODIUM 20 MG/1
20 TABLET, DELAYED RELEASE ORAL
Qty: 30 TABLET | Refills: 10 | Status: SHIPPED | OUTPATIENT
Start: 2025-01-31 | End: 2025-12-26

## 2025-01-31 ASSESSMENT — ACTIVITIES OF DAILY LIVING (ADL)
DOING_HOUSEWORK: INDEPENDENT
MANAGING_FINANCES: INDEPENDENT
BATHING: INDEPENDENT
DRESSING: INDEPENDENT
TAKING_MEDICATION: INDEPENDENT
GROCERY_SHOPPING: INDEPENDENT

## 2025-01-31 ASSESSMENT — ENCOUNTER SYMPTOMS
COUGH: 1
PALPITATIONS: 0
WHEEZING: 0
HEADACHES: 1
SINUS PRESSURE: 1
SHORTNESS OF BREATH: 0

## 2025-01-31 NOTE — PROGRESS NOTES
"Subjective   Patient ID: Elma Hernandez is a 79 y.o. female who presents for [facility] and Medicare Annual Wellness Visit Subsequent (And URI).    Overall doing well.  Patient is fairly active.  Denies any issues with CP,SOB.  She does get intermittent dizzy spells consistent with orthostasis.  We stressed need to keep up with fluids as she admits to not drinking much until later in the evening.    Chronic issues with anxiety and depression.  Fairly well controlled with medications.   Denies any issues with HA's.  Numbness/tingling has been stable.    No issues or changes with bowel or bladder habits.    Recent cough and nasal congestion.  No F/C/S.  She does not feel ill, more just annoyed with congestion.      Review of Systems   HENT:  Positive for sinus pressure.    Respiratory:  Positive for cough. Negative for shortness of breath and wheezing.    Cardiovascular:  Negative for chest pain and palpitations.   Neurological:  Positive for headaches.         Headaches x 1 month   ROS is otherwise unremarkable.      Objective   /80 (BP Location: Left arm, Patient Position: Sitting, BP Cuff Size: Adult)   Pulse 90   Temp 36.6 °C (97.9 °F) (Tympanic)   Resp 14   Ht 1.651 m (5' 5\")   Wt 79.8 kg (176 lb)   SpO2 97%   BMI 29.29 kg/m²     Physical Exam  Vitals reviewed.   Constitutional:       Appearance: Normal appearance.   HENT:      Head: Normocephalic.   Cardiovascular:      Rate and Rhythm: Normal rate and regular rhythm.   Pulmonary:      Effort: Pulmonary effort is normal.      Breath sounds: Normal breath sounds.   Musculoskeletal:         General: Normal range of motion.   Neurological:      General: No focal deficit present.      Mental Status: She is alert.   Psychiatric:         Mood and Affect: Mood normal.         Assessment/Plan   Problem List Items Addressed This Visit             ICD-10-CM    Allergic rhinitis J30.9    Relevant Medications    cetirizine (ZyrTEC) 10 mg tablet    CKD stage 3 " secondary to diabetes (Multi) E11.22, N18.30    Relevant Orders    CBC    Comprehensive Metabolic Panel    Vitamin D 25-Hydroxy,Total (for eval of Vitamin D levels)    Albumin-Creatinine Ratio, Urine Random    Familial hypertriglyceridemia E78.1    Gastroesophageal reflux disease K21.9    Relevant Medications    pantoprazole (ProtoNix) 20 mg EC tablet    Hyperlipidemia, mixed E78.2    Relevant Orders    Lipid Panel    Thyroid disease E07.9    Relevant Orders    Thyroid Stimulating Hormone    Type 2 diabetes mellitus without complication, with long-term current use of insulin (Multi) E11.9, Z79.4    Relevant Orders    Hemoglobin A1C    Albumin-Creatinine Ratio, Urine Random    Hypertensive chronic kidney disease with stage 5 chronic kidney disease or end stage renal disease I12.0    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  We reviewed and discussed all the above.  We discussed current medications as well as most recent test results.  Cough consistent with viral infection.  If symptoms worsen then antibiotic otherwise, flonase and anti-histamines.    We discussed the importance and benefits of a healthy diet that is low in sugars.  We stressed need to increase her fluid intake.    We reviewed and discussed the benefits of regular physical exercise.   We also discussed the importance of stress management and good sleep hygiene.  Annual Wellness Visit  questions and answers were reviewed and discussed including the importance of discussing end of life wishes as well as having a living will and health care power of .     We will continue to work on lifestyle improvements and follow-up in 3-4 months, sooner if any issues should arise.

## 2025-02-03 ENCOUNTER — TELEPHONE (OUTPATIENT)
Dept: PRIMARY CARE | Facility: CLINIC | Age: 80
End: 2025-02-03
Payer: MEDICARE

## 2025-02-03 DIAGNOSIS — R41.3 MEMORY CHANGE: Primary | ICD-10-CM

## 2025-02-03 DIAGNOSIS — G47.00 INSOMNIA, UNSPECIFIED TYPE: ICD-10-CM

## 2025-02-03 RX ORDER — NORTRIPTYLINE HYDROCHLORIDE 10 MG/1
30 CAPSULE ORAL NIGHTLY
Qty: 270 CAPSULE | Refills: 3 | Status: SHIPPED | OUTPATIENT
Start: 2025-02-03 | End: 2026-02-03

## 2025-02-03 NOTE — TELEPHONE ENCOUNTER
Patient was seen on Friday and said she has begun to wheeze since then and is using the albuterol and wants to know if there is anything else she can do- please advise    Also patient needs refilled:  nortriptyline (Pamelor) 10 mg capsule   [id number] - [place], [place] - [address] [address]  Phone: [phone]  Fax: [phone]

## 2025-02-05 ENCOUNTER — TELEPHONE (OUTPATIENT)
Dept: PEDIATRICS | Facility: CLINIC | Age: 80
End: 2025-02-05
Payer: MEDICARE

## 2025-02-05 DIAGNOSIS — B37.0 THRUSH: Primary | ICD-10-CM

## 2025-02-05 RX ORDER — NYSTATIN 100000 [USP'U]/ML
7.5 SUSPENSION ORAL 3 TIMES DAILY
Qty: 280 ML | Refills: 0 | Status: SHIPPED | OUTPATIENT
Start: 2025-02-05

## 2025-02-05 NOTE — TELEPHONE ENCOUNTER
Pt said she has been using Trylegy but she is experiencing burning around back of tongue and sides of tongue. She said it started on Monday. She doesn't think it's an allergic reaction. She would like to know if she can do anything regarding this.

## 2025-03-17 ENCOUNTER — TELEPHONE (OUTPATIENT)
Dept: ENDOCRINOLOGY | Facility: CLINIC | Age: 80
End: 2025-03-17
Payer: MEDICARE

## 2025-03-17 DIAGNOSIS — E11.65 TYPE 2 DIABETES MELLITUS WITH HYPERGLYCEMIA, WITH LONG-TERM CURRENT USE OF INSULIN: ICD-10-CM

## 2025-03-17 DIAGNOSIS — Z79.4 TYPE 2 DIABETES MELLITUS WITH HYPERGLYCEMIA, WITH LONG-TERM CURRENT USE OF INSULIN: ICD-10-CM

## 2025-03-17 RX ORDER — FLASH GLUCOSE SENSOR
KIT MISCELLANEOUS
Qty: 6 EACH | Refills: 1 | Status: SHIPPED | OUTPATIENT
Start: 2025-03-17

## 2025-03-18 RX ORDER — FLASH GLUCOSE SENSOR
KIT MISCELLANEOUS
Refills: 0 | OUTPATIENT
Start: 2025-03-18

## 2025-03-19 ENCOUNTER — TELEPHONE (OUTPATIENT)
Dept: OBSTETRICS AND GYNECOLOGY | Facility: CLINIC | Age: 80
End: 2025-03-19
Payer: MEDICARE

## 2025-03-19 NOTE — TELEPHONE ENCOUNTER
Patient called in to make an appointment. She used to be a patient of Dr. Hernandez but it has been over 3 years since she's been last seen so she would be considered a new patient. I made an attempt to make an appt with Dr. Hernandez but she did not have anythng available until June. I was able to find a sooner appointment with Dr. Mendez in [place] for April 25th but patient said she believes she has a yeast infection so I was not sure if she should wait that long. Please advise.

## 2025-03-20 ENCOUNTER — APPOINTMENT (OUTPATIENT)
Dept: ENDOCRINOLOGY | Facility: CLINIC | Age: 80
End: 2025-03-20
Payer: MEDICARE

## 2025-03-20 NOTE — TELEPHONE ENCOUNTER
I was able to reach patient and get her scheduled for tomorrow, Friday in [place] with Dr. Mendez.    denies pain/discomfort (Rating = 0)

## 2025-03-21 ENCOUNTER — OFFICE VISIT (OUTPATIENT)
Dept: OBSTETRICS AND GYNECOLOGY | Facility: CLINIC | Age: 80
End: 2025-03-21
Payer: MEDICARE

## 2025-03-21 VITALS — SYSTOLIC BLOOD PRESSURE: 115 MMHG | DIASTOLIC BLOOD PRESSURE: 67 MMHG | BODY MASS INDEX: 28.79 KG/M2 | WEIGHT: 173 LBS

## 2025-03-21 DIAGNOSIS — B37.2 CANDIDAL INTERTRIGO: Primary | ICD-10-CM

## 2025-03-21 PROCEDURE — 1036F TOBACCO NON-USER: CPT | Performed by: OBSTETRICS & GYNECOLOGY

## 2025-03-21 PROCEDURE — 3074F SYST BP LT 130 MM HG: CPT | Performed by: OBSTETRICS & GYNECOLOGY

## 2025-03-21 PROCEDURE — 3078F DIAST BP <80 MM HG: CPT | Performed by: OBSTETRICS & GYNECOLOGY

## 2025-03-21 PROCEDURE — 1159F MED LIST DOCD IN RCRD: CPT | Performed by: OBSTETRICS & GYNECOLOGY

## 2025-03-21 PROCEDURE — 99214 OFFICE O/P EST MOD 30 MIN: CPT | Performed by: OBSTETRICS & GYNECOLOGY

## 2025-03-21 PROCEDURE — 1123F ACP DISCUSS/DSCN MKR DOCD: CPT | Performed by: OBSTETRICS & GYNECOLOGY

## 2025-03-21 RX ORDER — NYSTATIN AND TRIAMCINOLONE ACETONIDE 100000; 1 [USP'U]/G; MG/G
OINTMENT TOPICAL 2 TIMES DAILY
Qty: 60 G | Refills: 0 | Status: SHIPPED | OUTPATIENT
Start: 2025-03-21

## 2025-03-21 ASSESSMENT — SOCIAL DETERMINANTS OF HEALTH (SDOH)

## 2025-03-21 NOTE — PROGRESS NOTES
GYNECOLOGY PROGRESS NOTE          CC:     Chief Complaint   Patient presents with    Vaginitis/Bacterial Vaginosis        HPI:  Elma Hernandez is here with new complaint of vulvar itching.   It is on both sides in growth.  S/p KAR BSO for endometrial cancer more than 25 years ago.  Not sexually active.  NO bleeding.  Takes shower in a chair twice weekly also has diabetes.  No recent antibiotic use.  No new urinary symptoms.      Zina Mendez MD  Past Medical History:   Diagnosis Date    Contusion of unspecified part of head, initial encounter     Head contusion    Hyperlipidemia     Hypertension     Other chest pain 03/05/2018    Chest heaviness    Personal history of diseases of the skin and subcutaneous tissue 08/16/2019    History of folliculitis    Personal history of malignant neoplasm of other parts of uterus     History of malignant neoplasm of endometrium    Personal history of other diseases of the respiratory system 08/21/2020    History of sinusitis    Personal history of other diseases of the respiratory system 09/13/2020    History of acute sinusitis    Personal history of other endocrine, nutritional and metabolic disease     History of diabetes mellitus    Renal disorder     Type 2 diabetes mellitus with other diabetic neurological complication 11/30/2020    Diabetes mellitus with neurological manifestations    Type 2 diabetes mellitus with other skin complications 03/18/2022    Diabetic foot infection    Type 2 diabetes mellitus with other skin ulcer (CODE)     Diabetic pressure ulcer, stage 1     Past Surgical History:   Procedure Laterality Date    CHOLECYSTECTOMY  11/11/2014    Cholecystectomy    HYSTERECTOMY  11/11/2014    Hysterectomy    MR HEAD ANGIO WO IV CONTRAST  1/29/2021    MR HEAD ANGIO WO IV CONTRAST 1/29/2021 [facility]    MR HEAD ANGIO WO IV CONTRAST  11/7/2023    MR HEAD ANGIO WO IV CONTRAST 11/7/2023 [facility]    OTHER SURGICAL HISTORY  06/11/2019    Breast biopsy     OTHER SURGICAL HISTORY  2019    Thyroidectomy    OTHER SURGICAL HISTORY  2019    Hip surgery    TONSILLECTOMY  2014    Tonsillectomy     Social History     Tobacco Use    Smoking status: Never    Smokeless tobacco: Never   Vaping Use    Vaping status: Never Used   Substance Use Topics    Alcohol use: Not Currently     Alcohol/week: 1.0 standard drink of alcohol     Types: 1 Standard drinks or equivalent per week    Drug use: Never     Family History   Problem Relation Name Age of Onset    Other (Cardiac disorder) Mother      Diabetes Mother      Hypertension Mother      Lung disease Mother      Kidney cancer Mother      Diabetes Father      Other (Cardiac disorder) Father        OB History          3    Para   3    Term   3       0    AB   0    Living   3         SAB   0    IAB   0    Ectopic   0    Multiple   0    Live Births   3                  ROS:  GYN - see HPI        PHYSICAL EXAM:  /67   Wt 78.5 kg (173 lb)   BMI 28.79 kg/m²   GEN:  A&O, NAD  HEENT:  head HC/AT, no visible goiter  PSYCH:  normal affect, non-anxious  EGBUS-erythema and odor in bilateral genitocrural folds, labia normal, urethra normal, vagina atrophic first degree cystocele, BME no masses palpable      IMPRESSION/PLAN:    Problem List Items Addressed This Visit    None  Visit Diagnoses       Candidal intertrigo    -  Primary    Relevant Medications    nystatin-triamcinolone (Mycolog II) ointment            D/w pt looks like intertrigo, discussed cleaning and air out folds after showering, ointment twice daily, fu 2 weeks    After the visit, the patient asked to use the restroom.  While she was standing in dukes she fell to floor, hitting her head on the wall and falling on her side.  She did not lose consciousness, no chest pain or palpitations.  She was not in pain.  She stated that she has been getting dizzy a lot.  We were able to put in her a wheelchair.  Her vitals signs were normal.  She checked  her glucose which was in the 60s which was low for her, we gave her a piece of candy and water.  The patient stated that she wanted to go home with her , I recommended that if she was in pain or dizzy or excessive tired or having any vision changes to go to ED.

## 2025-03-27 ENCOUNTER — APPOINTMENT (OUTPATIENT)
Dept: ENDOCRINOLOGY | Facility: CLINIC | Age: 80
End: 2025-03-27
Payer: MEDICARE

## 2025-04-17 DIAGNOSIS — E78.2 HYPERLIPIDEMIA, MIXED: Primary | ICD-10-CM

## 2025-04-17 RX ORDER — ATORVASTATIN CALCIUM 10 MG/1
10 TABLET, FILM COATED ORAL DAILY
Qty: 90 TABLET | Refills: 0 | Status: SHIPPED | OUTPATIENT
Start: 2025-04-17

## 2025-04-25 ENCOUNTER — APPOINTMENT (OUTPATIENT)
Dept: OBSTETRICS AND GYNECOLOGY | Facility: CLINIC | Age: 80
End: 2025-04-25
Payer: MEDICARE

## 2025-04-25 ENCOUNTER — APPOINTMENT (OUTPATIENT)
Dept: PRIMARY CARE | Facility: CLINIC | Age: 80
End: 2025-04-25
Payer: MEDICARE

## 2025-04-25 VITALS
BODY MASS INDEX: 28.82 KG/M2 | TEMPERATURE: 97.4 F | DIASTOLIC BLOOD PRESSURE: 80 MMHG | HEART RATE: 95 BPM | OXYGEN SATURATION: 97 % | HEIGHT: 65 IN | RESPIRATION RATE: 14 BRPM | SYSTOLIC BLOOD PRESSURE: 140 MMHG | WEIGHT: 173 LBS

## 2025-04-25 DIAGNOSIS — N18.30 CKD STAGE 3 SECONDARY TO DIABETES (MULTI): ICD-10-CM

## 2025-04-25 DIAGNOSIS — E78.1 FAMILIAL HYPERTRIGLYCERIDEMIA: ICD-10-CM

## 2025-04-25 DIAGNOSIS — I70.1 RENAL ARTERY STENOSIS, NATIVE, BILATERAL (CMS-HCC): ICD-10-CM

## 2025-04-25 DIAGNOSIS — I50.43 CHF (CONGESTIVE HEART FAILURE), NYHA CLASS I, ACUTE ON CHRONIC, COMBINED: ICD-10-CM

## 2025-04-25 DIAGNOSIS — G90.9 AUTONOMIC DYSFUNCTION: ICD-10-CM

## 2025-04-25 DIAGNOSIS — Z13.220 SCREENING FOR HYPERLIPIDEMIA: ICD-10-CM

## 2025-04-25 DIAGNOSIS — E07.9 THYROID DISEASE: ICD-10-CM

## 2025-04-25 DIAGNOSIS — E11.22 CKD STAGE 3 SECONDARY TO DIABETES (MULTI): ICD-10-CM

## 2025-04-25 DIAGNOSIS — I10 ESSENTIAL HYPERTENSION: Primary | ICD-10-CM

## 2025-04-25 PROCEDURE — 1159F MED LIST DOCD IN RCRD: CPT | Performed by: INTERNAL MEDICINE

## 2025-04-25 PROCEDURE — 1123F ACP DISCUSS/DSCN MKR DOCD: CPT | Performed by: INTERNAL MEDICINE

## 2025-04-25 PROCEDURE — 3079F DIAST BP 80-89 MM HG: CPT | Performed by: INTERNAL MEDICINE

## 2025-04-25 PROCEDURE — 1158F ADVNC CARE PLAN TLK DOCD: CPT | Performed by: INTERNAL MEDICINE

## 2025-04-25 PROCEDURE — 1160F RVW MEDS BY RX/DR IN RCRD: CPT | Performed by: INTERNAL MEDICINE

## 2025-04-25 PROCEDURE — 3077F SYST BP >= 140 MM HG: CPT | Performed by: INTERNAL MEDICINE

## 2025-04-25 PROCEDURE — 99214 OFFICE O/P EST MOD 30 MIN: CPT | Performed by: INTERNAL MEDICINE

## 2025-04-25 ASSESSMENT — ENCOUNTER SYMPTOMS
ABDOMINAL PAIN: 0
DIARRHEA: 0
CONSTIPATION: 0
NAUSEA: 0
SHORTNESS OF BREATH: 0
COUGH: 0
WHEEZING: 0
PALPITATIONS: 0

## 2025-04-25 NOTE — PROGRESS NOTES
"Subjective   Patient ID: Elma Hernandez is a 79 y.o. female who presents for Hypertension.    Overall she has been feeling pretty well.  She does have periodic dizzy spells when standing, but not consistently.  She denies any near-syncope or syncope.  Denies any chest pain.  We did review and discuss her current medications as well as her most recent blood test results.    Review of Systems   Respiratory:  Negative for cough, shortness of breath and wheezing.    Cardiovascular:  Negative for chest pain and palpitations.   Gastrointestinal:  Negative for abdominal pain, constipation, diarrhea and nausea.       Objective   /80 (BP Location: Right arm, Patient Position: Sitting, BP Cuff Size: Adult)   Pulse 95   Temp 36.3 °C (97.4 °F) (Tympanic)   Resp 14   Ht 1.651 m (5' 5\")   Wt 78.5 kg (173 lb)   SpO2 97%   BMI 28.79 kg/m²     Physical Exam  Vitals reviewed.   Constitutional:       Appearance: Normal appearance.   HENT:      Head: Normocephalic.   Cardiovascular:      Rate and Rhythm: Normal rate and regular rhythm.   Pulmonary:      Effort: Pulmonary effort is normal.      Breath sounds: Normal breath sounds.   Musculoskeletal:         General: Normal range of motion.   Neurological:      General: No focal deficit present.      Mental Status: She is alert.   Psychiatric:         Mood and Affect: Mood normal.         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  Hypertension is fairly well-controlled.  She does appear to have autonomic dysfunction from years of diabetes of which diabetic control has been less than ideal.  This is mostly due to compliance related issues.  Diabetes has been under better control as of lately.  We discussed the need to stay well-hydrated due to this autonomic dysfunction and periodic orthostatic hypotension.  Will continue to monitor blood pressure closely we may need to allow for a mild degree of permissive hypertension.  We discussed her chronic kidney disease and her tendency for hyperkalemia.  Will continue to monitor her potassium levels as well as her other electrolytes and renal function.  We did discuss low potassium diet.  Will continue to monitor her thyroid function as well.  Will follow-up in 6 months, sooner if any issues or concerns

## 2025-04-26 PROBLEM — I50.43 CHF (CONGESTIVE HEART FAILURE), NYHA CLASS I, ACUTE ON CHRONIC, COMBINED: Status: ACTIVE | Noted: 2025-04-26

## 2025-04-29 DIAGNOSIS — E11.3512 PROLIFERATIVE DIABETIC RETINOPATHY OF LEFT EYE WITH MACULAR EDEMA ASSOCIATED WITH TYPE 2 DIABETES MELLITUS: ICD-10-CM

## 2025-04-29 DIAGNOSIS — R26.89 POOR BALANCE: Primary | ICD-10-CM

## 2025-04-29 DIAGNOSIS — R29.898 WEAKNESS OF BOTH LOWER EXTREMITIES: ICD-10-CM

## 2025-05-08 ENCOUNTER — APPOINTMENT (OUTPATIENT)
Dept: OBSTETRICS AND GYNECOLOGY | Facility: CLINIC | Age: 80
End: 2025-05-08
Payer: MEDICARE

## 2025-05-08 VITALS
SYSTOLIC BLOOD PRESSURE: 132 MMHG | HEIGHT: 65 IN | BODY MASS INDEX: 28.72 KG/M2 | DIASTOLIC BLOOD PRESSURE: 62 MMHG | WEIGHT: 172.4 LBS

## 2025-05-08 DIAGNOSIS — B37.2 CANDIDAL INTERTRIGO: ICD-10-CM

## 2025-05-08 DIAGNOSIS — L30.4 INTERTRIGO OF GENITAL LABIA: Primary | ICD-10-CM

## 2025-05-08 PROCEDURE — 3075F SYST BP GE 130 - 139MM HG: CPT | Performed by: OBSTETRICS & GYNECOLOGY

## 2025-05-08 PROCEDURE — 1036F TOBACCO NON-USER: CPT | Performed by: OBSTETRICS & GYNECOLOGY

## 2025-05-08 PROCEDURE — 3078F DIAST BP <80 MM HG: CPT | Performed by: OBSTETRICS & GYNECOLOGY

## 2025-05-08 PROCEDURE — 1160F RVW MEDS BY RX/DR IN RCRD: CPT | Performed by: OBSTETRICS & GYNECOLOGY

## 2025-05-08 PROCEDURE — 1159F MED LIST DOCD IN RCRD: CPT | Performed by: OBSTETRICS & GYNECOLOGY

## 2025-05-08 PROCEDURE — 99213 OFFICE O/P EST LOW 20 MIN: CPT | Performed by: OBSTETRICS & GYNECOLOGY

## 2025-05-08 RX ORDER — NYSTATIN AND TRIAMCINOLONE ACETONIDE 100000; 1 [USP'U]/G; MG/G
OINTMENT TOPICAL 2 TIMES DAILY
Qty: 60 G | Refills: 3 | Status: SHIPPED | OUTPATIENT
Start: 2025-05-08

## 2025-05-08 ASSESSMENT — LIFESTYLE VARIABLES
HOW OFTEN DO YOU HAVE A DRINK CONTAINING ALCOHOL: MONTHLY OR LESS
HOW MANY STANDARD DRINKS CONTAINING ALCOHOL DO YOU HAVE ON A TYPICAL DAY: 1 OR 2
SKIP TO QUESTIONS 9-10: 1
AUDIT-C TOTAL SCORE: 1
HOW OFTEN DO YOU HAVE SIX OR MORE DRINKS ON ONE OCCASION: NEVER

## 2025-05-08 NOTE — PROGRESS NOTES
"GYNECOLOGY PROGRESS NOTE          CC:     Chief Complaint   Patient presents with    Follow-up        HPI:  Elma Hernandez is here with to follow up on vulvar itching, improved with nystatin-triamcinolone but not completely also notices odor.    Zina Mendez MD  Medical History[1]  Surgical History[2]  Social History[3]  Family History[4]   [unfilled]    ROS:  GYN - see HPI        PHYSICAL EXAM:  /62 (BP Location: Right arm, Patient Position: Sitting)   Ht 1.651 m (5' 5\")   Wt 78.2 kg (172 lb 6.4 oz)   BMI 28.69 kg/m²   GEN:  A&O, NAD  HEENT:  head HC/AT, no visible goiter  PSYCH:  normal affect, non-anxious  EGBUS: less erythema in genitocrural folds, no skin breakdown, labia and urethra normal  IMPRESSION/PLAN:    Problem List Items Addressed This Visit       Intertrigo of genital labia - Primary       Intertrigo improving, continue ointment once or twice daily.  D/w pt I think a sitz bath would also help greatly since she cannot get into the tub anymore.  Pt will try and fu 2 mos.           [1]   Past Medical History:  Diagnosis Date    Contusion of unspecified part of head, initial encounter     Head contusion    Hyperlipidemia     Hypertension     Other chest pain 03/05/2018    Chest heaviness    Personal history of diseases of the skin and subcutaneous tissue 08/16/2019    History of folliculitis    Personal history of malignant neoplasm of other parts of uterus     History of malignant neoplasm of endometrium    Personal history of other diseases of the respiratory system 08/21/2020    History of sinusitis    Personal history of other diseases of the respiratory system 09/13/2020    History of acute sinusitis    Personal history of other endocrine, nutritional and metabolic disease     History of diabetes mellitus    Renal disorder     Type 2 diabetes mellitus with other diabetic neurological complication 11/30/2020    Diabetes mellitus with neurological manifestations    Type 2 diabetes " mellitus with other skin complications 03/18/2022    Diabetic foot infection    Type 2 diabetes mellitus with other skin ulcer (CODE)     Diabetic pressure ulcer, stage 1   [2]   Past Surgical History:  Procedure Laterality Date    CHOLECYSTECTOMY  11/11/2014    Cholecystectomy    HYSTERECTOMY  11/11/2014    Hysterectomy    MR HEAD ANGIO WO IV CONTRAST  1/29/2021    MR HEAD ANGIO WO IV CONTRAST 1/29/2021 [facility]    MR HEAD ANGIO WO IV CONTRAST  11/7/2023    MR HEAD ANGIO WO IV CONTRAST 11/7/2023 [facility]    OTHER SURGICAL HISTORY  06/11/2019    Breast biopsy    OTHER SURGICAL HISTORY  06/11/2019    Thyroidectomy    OTHER SURGICAL HISTORY  06/11/2019    Hip surgery    TONSILLECTOMY  11/11/2014    Tonsillectomy   [3]   Social History  Tobacco Use    Smoking status: Never    Smokeless tobacco: Never   Vaping Use    Vaping status: Never Used   Substance Use Topics    Alcohol use: Not Currently     Alcohol/week: 1.0 standard drink of alcohol     Types: 1 Standard drinks or equivalent per week    Drug use: Never   [4]   Family History  Problem Relation Name Age of Onset    Other (Cardiac disorder) Mother      Diabetes Mother      Hypertension Mother      Lung disease Mother      Kidney cancer Mother      Diabetes Father      Other (Cardiac disorder) Father

## 2025-05-12 ENCOUNTER — TELEPHONE (OUTPATIENT)
Dept: PRIMARY CARE | Facility: CLINIC | Age: 80
End: 2025-05-12

## 2025-05-12 RX ORDER — BENAZEPRIL HYDROCHLORIDE 5 MG/1
5 TABLET ORAL
Qty: 180 TABLET | Refills: 3 | Status: CANCELLED | OUTPATIENT
Start: 2025-05-12 | End: 2026-05-12

## 2025-05-12 NOTE — TELEPHONE ENCOUNTER
Pt called, stated the name of med is benezpril 5mg twice a day.  Please give a call she has other questions.

## 2025-05-15 ENCOUNTER — APPOINTMENT (OUTPATIENT)
Dept: ENDOCRINOLOGY | Facility: CLINIC | Age: 80
End: 2025-05-15
Payer: MEDICARE

## 2025-05-15 VITALS
HEIGHT: 65 IN | SYSTOLIC BLOOD PRESSURE: 164 MMHG | WEIGHT: 173 LBS | BODY MASS INDEX: 28.82 KG/M2 | DIASTOLIC BLOOD PRESSURE: 58 MMHG

## 2025-05-15 DIAGNOSIS — E78.2 HYPERLIPIDEMIA, MIXED: ICD-10-CM

## 2025-05-15 DIAGNOSIS — I10 ESSENTIAL HYPERTENSION: ICD-10-CM

## 2025-05-15 DIAGNOSIS — E11.65 TYPE 2 DIABETES MELLITUS WITH HYPERGLYCEMIA, WITH LONG-TERM CURRENT USE OF INSULIN: Primary | ICD-10-CM

## 2025-05-15 DIAGNOSIS — Z79.4 TYPE 2 DIABETES MELLITUS WITH HYPERGLYCEMIA, WITH LONG-TERM CURRENT USE OF INSULIN: Primary | ICD-10-CM

## 2025-05-15 DIAGNOSIS — Z97.8 USES SELF-APPLIED CONTINUOUS GLUCOSE MONITORING DEVICE: ICD-10-CM

## 2025-05-15 LAB
POC FINGERSTICK BLOOD GLUCOSE: 172 MG/DL (ref 70–100)
POC HEMOGLOBIN A1C: 6 % (ref 4.2–6.5)

## 2025-05-15 PROCEDURE — 3078F DIAST BP <80 MM HG: CPT | Performed by: HOSPITALIST

## 2025-05-15 PROCEDURE — 99214 OFFICE O/P EST MOD 30 MIN: CPT | Performed by: HOSPITALIST

## 2025-05-15 PROCEDURE — 3077F SYST BP >= 140 MM HG: CPT | Performed by: HOSPITALIST

## 2025-05-15 PROCEDURE — 83036 HEMOGLOBIN GLYCOSYLATED A1C: CPT | Performed by: HOSPITALIST

## 2025-05-15 PROCEDURE — 82962 GLUCOSE BLOOD TEST: CPT | Performed by: HOSPITALIST

## 2025-05-15 PROCEDURE — 95251 CONT GLUC MNTR ANALYSIS I&R: CPT | Performed by: HOSPITALIST

## 2025-05-15 RX ORDER — BLOOD-GLUCOSE,RECEIVER,CONT
EACH MISCELLANEOUS
Qty: 1 EACH | Refills: 0 | Status: SHIPPED | OUTPATIENT
Start: 2025-05-15

## 2025-05-15 RX ORDER — SEMAGLUTIDE 1.34 MG/ML
0.25 INJECTION, SOLUTION SUBCUTANEOUS WEEKLY
Qty: 2 ML | Refills: 1 | Status: SHIPPED | OUTPATIENT
Start: 2025-05-15 | End: 2025-10-11

## 2025-05-15 RX ORDER — BLOOD-GLUCOSE SENSOR
EACH MISCELLANEOUS
Qty: 5 EACH | Refills: 1 | Status: SHIPPED | OUTPATIENT
Start: 2025-05-15

## 2025-05-15 ASSESSMENT — ENCOUNTER SYMPTOMS
SHORTNESS OF BREATH: 0
NAUSEA: 0
ARTHRALGIAS: 0
TROUBLE SWALLOWING: 0
CHEST TIGHTNESS: 0
SORE THROAT: 0
DIARRHEA: 0
NERVOUS/ANXIOUS: 0
ABDOMINAL PAIN: 0
SLEEP DISTURBANCE: 0
AGITATION: 0
PHOTOPHOBIA: 0
ABDOMINAL DISTENTION: 0
DYSURIA: 0
CONSTITUTIONAL NEGATIVE: 1
TREMORS: 0
FREQUENCY: 0
EYE ITCHING: 0
HEADACHES: 0
VOICE CHANGE: 0
LIGHT-HEADEDNESS: 0
PALPITATIONS: 0
VOMITING: 0
CONSTIPATION: 0

## 2025-07-01 DIAGNOSIS — F41.9 ANXIETY: ICD-10-CM

## 2025-07-01 RX ORDER — SERTRALINE HYDROCHLORIDE 100 MG/1
200 TABLET, FILM COATED ORAL DAILY
Qty: 180 TABLET | Refills: 0 | Status: SHIPPED | OUTPATIENT
Start: 2025-07-01

## 2025-07-10 ENCOUNTER — APPOINTMENT (OUTPATIENT)
Dept: OBSTETRICS AND GYNECOLOGY | Facility: CLINIC | Age: 80
End: 2025-07-10
Payer: MEDICARE

## 2025-07-22 ENCOUNTER — APPOINTMENT (OUTPATIENT)
Dept: CARDIOLOGY | Facility: HOSPITAL | Age: 80
End: 2025-07-22
Payer: MEDICARE

## 2025-07-22 ENCOUNTER — APPOINTMENT (OUTPATIENT)
Dept: ENDOCRINOLOGY | Facility: CLINIC | Age: 80
End: 2025-07-22
Payer: MEDICARE

## 2025-07-22 VITALS
SYSTOLIC BLOOD PRESSURE: 114 MMHG | WEIGHT: 168 LBS | HEIGHT: 65 IN | DIASTOLIC BLOOD PRESSURE: 58 MMHG | BODY MASS INDEX: 27.99 KG/M2

## 2025-07-22 DIAGNOSIS — E78.2 HYPERLIPIDEMIA, MIXED: ICD-10-CM

## 2025-07-22 DIAGNOSIS — Z79.4 TYPE 2 DIABETES MELLITUS WITH HYPERGLYCEMIA, WITH LONG-TERM CURRENT USE OF INSULIN: Primary | ICD-10-CM

## 2025-07-22 DIAGNOSIS — E11.65 TYPE 2 DIABETES MELLITUS WITH HYPERGLYCEMIA, WITH LONG-TERM CURRENT USE OF INSULIN: Primary | ICD-10-CM

## 2025-07-22 DIAGNOSIS — I10 ESSENTIAL HYPERTENSION: ICD-10-CM

## 2025-07-22 LAB
POC FINGERSTICK BLOOD GLUCOSE: 210 MG/DL (ref 70–100)
POC HEMOGLOBIN A1C: 6 % (ref 4.2–6.5)

## 2025-07-22 PROCEDURE — 83036 HEMOGLOBIN GLYCOSYLATED A1C: CPT | Performed by: HOSPITALIST

## 2025-07-22 PROCEDURE — G2211 COMPLEX E/M VISIT ADD ON: HCPCS | Performed by: HOSPITALIST

## 2025-07-22 PROCEDURE — 99214 OFFICE O/P EST MOD 30 MIN: CPT | Performed by: HOSPITALIST

## 2025-07-22 PROCEDURE — 3078F DIAST BP <80 MM HG: CPT | Performed by: HOSPITALIST

## 2025-07-22 PROCEDURE — 82962 GLUCOSE BLOOD TEST: CPT | Performed by: HOSPITALIST

## 2025-07-22 PROCEDURE — 1159F MED LIST DOCD IN RCRD: CPT | Performed by: HOSPITALIST

## 2025-07-22 PROCEDURE — 95251 CONT GLUC MNTR ANALYSIS I&R: CPT | Performed by: HOSPITALIST

## 2025-07-22 PROCEDURE — 3074F SYST BP LT 130 MM HG: CPT | Performed by: HOSPITALIST

## 2025-07-22 RX ORDER — SEMAGLUTIDE 1.34 MG/ML
0.25 INJECTION, SOLUTION SUBCUTANEOUS WEEKLY
Qty: 4 ML | Refills: 1 | Status: SHIPPED | OUTPATIENT
Start: 2025-07-22 | End: 2026-05-17

## 2025-07-22 ASSESSMENT — ENCOUNTER SYMPTOMS
ARTHRALGIAS: 0
SORE THROAT: 0
JOINT SWELLING: 1
CONSTIPATION: 0
ABDOMINAL DISTENTION: 0
DYSURIA: 0
NERVOUS/ANXIOUS: 0
HEADACHES: 0
VOICE CHANGE: 0
SLEEP DISTURBANCE: 0
ABDOMINAL PAIN: 0
PHOTOPHOBIA: 0
FREQUENCY: 0
PALPITATIONS: 0
LIGHT-HEADEDNESS: 0
EYE ITCHING: 0
VOMITING: 0
TROUBLE SWALLOWING: 0
CHEST TIGHTNESS: 0
TREMORS: 0
NAUSEA: 1
DIZZINESS: 1
DIARRHEA: 0
SHORTNESS OF BREATH: 0
AGITATION: 0
CONSTITUTIONAL NEGATIVE: 1

## 2025-07-22 NOTE — PROGRESS NOTES
Subjective   Patient ID:   Patient ID:  (dx dm: 1990/[facility] DM: father /PCP: [facility] /Podiatrist: Confalone /Eye exam: every 2 month / /Patient testing glucose 4 times daily, with freestyle jose manuel 2 READER. due to fluctuating blood glucose, hypoglycemia and 4 insulin injections. /Patient is injecting meal time insulin 3 times daily based on glucose reading and sliding scale. Pt adhering and benefiting from cgm treatment.      Lab Results   Component Value Date    HGBA1C 6.0 07/22/2025      HPI  See assessment and plan  Review of Systems   Constitutional: Negative.         Hair fall   HENT:  Negative for sore throat, trouble swallowing and voice change.    Eyes:  Negative for photophobia, itching and visual disturbance.   Respiratory:  Negative for chest tightness and shortness of breath.    Cardiovascular:  Positive for leg swelling. Negative for chest pain and palpitations.   Gastrointestinal:  Positive for nausea. Negative for abdominal distention, abdominal pain, constipation, diarrhea and vomiting.   Endocrine: Negative for cold intolerance, heat intolerance and polyuria.   Genitourinary:  Negative for dysuria and frequency.   Musculoskeletal:  Positive for joint swelling. Negative for arthralgias.   Skin:  Negative for pallor.   Allergic/Immunologic: Negative for environmental allergies.   Neurological:  Positive for dizziness. Negative for tremors, light-headedness and headaches.   Psychiatric/Behavioral:  Negative for agitation and sleep disturbance. The patient is not nervous/anxious.        Objective   Physical Exam  Constitutional:       Appearance: Normal appearance.   HENT:      Head: Normocephalic.      Nose: Nose normal.      Mouth/Throat:      Mouth: Mucous membranes are moist.     Eyes:      Extraocular Movements: Extraocular movements intact.       Cardiovascular:      Rate and Rhythm: Normal rate.   Pulmonary:      Effort: Pulmonary effort is normal. No respiratory distress.   Abdominal:       "General: There is no distension.     Musculoskeletal:         General: Swelling present. Normal range of motion.      Cervical back: Normal range of motion and neck supple.      Comments: Right great toe with a 2 x 2 cm circular, erythematous, enlarged lesion     Skin:     General: Skin is warm and dry.     Neurological:      Mental Status: She is alert and oriented to person, place, and time.     Psychiatric:         Mood and Affect: Mood normal.      Visit Vitals  /58   Ht 1.651 m (5' 5\")   Wt 76.2 kg (168 lb)   BMI 27.96 kg/m²   OB Status Hysterectomy   Smoking Status Never   BSA 1.87 m²        Assessment/Plan   Diagnoses and all orders for this visit:  Type 2 diabetes mellitus with hyperglycemia, with long-term current use of insulin  -     POCT glycosylated hemoglobin (Hb A1C) manually resulted  -     POCT glucose manually resulted  -     dulaglutide (Trulicity) 0.75 mg/0.5 mL pen injector; Inject 0.75 mg under the skin 1 (one) time per week.  Essential hypertension  Hyperlipidemia, mixed  Uses self-applied continuous glucose monitoring device               T2 DM , controlled complicated with neuropathy and retinopathy ( getting monthly injections )    h/o CAD     current regimen:   Humalog 14-14-14-0 +scale TID 2 units for every 50 over 200  Lantus 0-0-0-40  Ozempic .25 mg once weekly    Past medication  Stopped Ozempic November 2023 due to severe GI issues even on 0.25 mg dose. [facility]   Trulicity switch to Ozempic recently as per recommendation by nephrology  Has history of yeast infections    A1c at goal       Repeated hemoglobin A1c 6.0    Personally reviewed CGM jose manuel 2 and interpreted the results  active 95 % , TIR 81 % , 2% low BG (in the past it was 8%), takes humalog after eating        Interval h/o : Cataract surgery in past  Had leg swelling    PLAN :   -Decrease Lantus dose to 30 units at bedtime.  -Continue Humalog at current dose: " 14-14-14-0.  -Continue Ozempic 0.25 mg weekly; if well tolerated, plan to increase to 0.5 mg in 2 weeks, prior to the next follow-up.  -Dietary adjustments:  Emphasized the importance of not skipping the Lantus dose.  If bedtime glucose is 120-130 mg/dL, instructed to consume a complex carbohydrate snack (e.g., peanut butter with toast, crackers, or granola bar).  If glucose is low before yard work, advised to eat a complex carbohydrate snack beforehand.  -Recommended referral to nutritionist for diabetes education.  -Patient is currently not taking home amlodipine; given the history of frequent falls and dizziness.  -Follow-up appointment scheduled for December 2025.  -Advised to follow up with podiatry regarding multiple tophi and lower extremity evaluation.  -Discussed GLP-1 agonist again.  She understand risks including nausea, vomiting, dehydration, pancreatitis, cholecystitis, constipation  Continue same insulin dose for now if sugar less than 90 in the morning advised to take 36 units of Lantus  - hypertG on statin and fenofibrate.     also has retinopathy, discussed small risk of worsening retinopathy with Ozempic      CKD : following Dr. Acosta       PAD : follows vascular     # Hair fall-followed dermatology      HyeprTG: familial    on statin and fenofibrate.   see above. not on vascepa , if TG high will add vascepa  Due for blood work.    # Leg swelling: Follow-up primary care physician. ?  Amlodipine     RTC 4-5 m      SH- aug 25 - 50 yrs of marriage.     on ozempic  went to [place] in sept 2022   kids- 3 kids- 8 GK , 3 are from previous partner. 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   live in [facility] [place]    [place].    [facility] and gets all [facility]s there   Having issues with anxiety especially after her son's wife passed away in the past.

## 2025-07-28 ENCOUNTER — TELEPHONE (OUTPATIENT)
Dept: PRIMARY CARE | Facility: CLINIC | Age: 80
End: 2025-07-28
Payer: MEDICARE

## 2025-07-28 NOTE — TELEPHONE ENCOUNTER
Pt's  Eric called , she has been passing out , he thinks the last time  may have been a stroke ,  ER  advised.

## 2025-08-13 ENCOUNTER — APPOINTMENT (OUTPATIENT)
Dept: ENDOCRINOLOGY | Facility: CLINIC | Age: 80
End: 2025-08-13
Payer: MEDICARE

## 2025-08-13 DIAGNOSIS — E11.9 DIABETES MELLITUS TYPE 2, INSULIN DEPENDENT (MULTI): Primary | ICD-10-CM

## 2025-08-13 DIAGNOSIS — Z79.4 DIABETES MELLITUS TYPE 2, INSULIN DEPENDENT (MULTI): Primary | ICD-10-CM

## 2025-08-15 ENCOUNTER — APPOINTMENT (OUTPATIENT)
Dept: OBSTETRICS AND GYNECOLOGY | Facility: CLINIC | Age: 80
End: 2025-08-15
Payer: MEDICARE

## 2025-08-15 VITALS — DIASTOLIC BLOOD PRESSURE: 54 MMHG | SYSTOLIC BLOOD PRESSURE: 87 MMHG

## 2025-08-15 DIAGNOSIS — L30.4 INTERTRIGO OF GENITAL LABIA: Primary | ICD-10-CM

## 2025-08-15 PROCEDURE — 99213 OFFICE O/P EST LOW 20 MIN: CPT | Performed by: OBSTETRICS & GYNECOLOGY

## 2025-08-15 PROCEDURE — 3078F DIAST BP <80 MM HG: CPT | Performed by: OBSTETRICS & GYNECOLOGY

## 2025-08-15 PROCEDURE — 1159F MED LIST DOCD IN RCRD: CPT | Performed by: OBSTETRICS & GYNECOLOGY

## 2025-08-15 PROCEDURE — 3074F SYST BP LT 130 MM HG: CPT | Performed by: OBSTETRICS & GYNECOLOGY

## 2025-08-29 ENCOUNTER — OFFICE VISIT (OUTPATIENT)
Dept: NEUROLOGY | Facility: CLINIC | Age: 80
End: 2025-08-29
Payer: MEDICARE

## 2025-08-29 ASSESSMENT — ENCOUNTER SYMPTOMS
DEPRESSION: 0
LOSS OF SENSATION IN FEET: 0
OCCASIONAL FEELINGS OF UNSTEADINESS: 0

## 2025-08-29 ASSESSMENT — PATIENT HEALTH QUESTIONNAIRE - PHQ9
SUM OF ALL RESPONSES TO PHQ9 QUESTIONS 1 AND 2: 0
1. LITTLE INTEREST OR PLEASURE IN DOING THINGS: NOT AT ALL
2. FEELING DOWN, DEPRESSED OR HOPELESS: NOT AT ALL

## 2025-08-29 ASSESSMENT — ANXIETY QUESTIONNAIRES
7. FEELING AFRAID AS IF SOMETHING AWFUL MIGHT HAPPEN: NOT AT ALL
6. BECOMING EASILY ANNOYED OR IRRITABLE: NOT AT ALL
GAD7 TOTAL SCORE: 0
IF YOU CHECKED OFF ANY PROBLEMS ON THIS QUESTIONNAIRE, HOW DIFFICULT HAVE THESE PROBLEMS MADE IT FOR YOU TO DO YOUR WORK, TAKE CARE OF THINGS AT HOME, OR GET ALONG WITH OTHER PEOPLE: NOT DIFFICULT AT ALL
5. BEING SO RESTLESS THAT IT IS HARD TO SIT STILL: NOT AT ALL
1. FEELING NERVOUS, ANXIOUS, OR ON EDGE: NOT AT ALL
3. WORRYING TOO MUCH ABOUT DIFFERENT THINGS: NOT AT ALL
4. TROUBLE RELAXING: NOT AT ALL
2. NOT BEING ABLE TO STOP OR CONTROL WORRYING: NOT AT ALL

## 2025-09-02 ENCOUNTER — TELEPHONE (OUTPATIENT)
Age: 80
End: 2025-09-02
Payer: MEDICARE

## 2025-09-03 DIAGNOSIS — I10 ESSENTIAL HYPERTENSION: ICD-10-CM

## 2025-09-04 DIAGNOSIS — E11.65 TYPE 2 DIABETES MELLITUS WITH HYPERGLYCEMIA, WITH LONG-TERM CURRENT USE OF INSULIN: ICD-10-CM

## 2025-09-04 DIAGNOSIS — Z79.4 TYPE 2 DIABETES MELLITUS WITH HYPERGLYCEMIA, WITH LONG-TERM CURRENT USE OF INSULIN: ICD-10-CM

## 2025-09-04 RX ORDER — INSULIN GLARGINE 100 [IU]/ML
INJECTION, SOLUTION SUBCUTANEOUS
Qty: 30 ML | Refills: 0 | Status: SHIPPED | OUTPATIENT
Start: 2025-09-04

## 2025-09-04 RX ORDER — CARVEDILOL 12.5 MG/1
12.5 TABLET ORAL 2 TIMES DAILY
Qty: 180 TABLET | Refills: 0 | Status: SHIPPED | OUTPATIENT
Start: 2025-09-04

## 2025-10-24 ENCOUNTER — APPOINTMENT (OUTPATIENT)
Dept: PRIMARY CARE | Facility: CLINIC | Age: 80
End: 2025-10-24
Payer: MEDICARE

## 2025-12-02 ENCOUNTER — APPOINTMENT (OUTPATIENT)
Dept: ENDOCRINOLOGY | Facility: CLINIC | Age: 80
End: 2025-12-02
Payer: MEDICARE

## 2026-04-02 ENCOUNTER — APPOINTMENT (OUTPATIENT)
Dept: ENDOCRINOLOGY | Facility: CLINIC | Age: 81
End: 2026-04-02
Payer: MEDICARE

## 2026-08-04 ENCOUNTER — APPOINTMENT (OUTPATIENT)
Dept: ENDOCRINOLOGY | Facility: CLINIC | Age: 81
End: 2026-08-04
Payer: MEDICARE